# Patient Record
Sex: MALE | Race: WHITE | NOT HISPANIC OR LATINO | Employment: UNEMPLOYED | URBAN - METROPOLITAN AREA
[De-identification: names, ages, dates, MRNs, and addresses within clinical notes are randomized per-mention and may not be internally consistent; named-entity substitution may affect disease eponyms.]

---

## 2017-01-03 ENCOUNTER — OFFICE VISIT (OUTPATIENT)
Dept: FAMILY MEDICINE | Facility: CLINIC | Age: 57
End: 2017-01-03
Payer: COMMERCIAL

## 2017-01-03 VITALS
OXYGEN SATURATION: 98 % | HEIGHT: 71 IN | HEART RATE: 96 BPM | RESPIRATION RATE: 16 BRPM | BODY MASS INDEX: 21.98 KG/M2 | DIASTOLIC BLOOD PRESSURE: 80 MMHG | TEMPERATURE: 97.5 F | SYSTOLIC BLOOD PRESSURE: 140 MMHG | WEIGHT: 157 LBS

## 2017-01-03 DIAGNOSIS — M70.22 OLECRANON BURSITIS OF LEFT ELBOW: Primary | ICD-10-CM

## 2017-01-03 DIAGNOSIS — F98.8 ATTENTION DEFICIT DISORDER: ICD-10-CM

## 2017-01-03 PROCEDURE — 99213 OFFICE O/P EST LOW 20 MIN: CPT | Performed by: FAMILY MEDICINE

## 2017-01-03 RX ORDER — METHYLPHENIDATE HYDROCHLORIDE 10 MG/1
TABLET ORAL
Qty: 150 TABLET | Refills: 0 | Status: SHIPPED | OUTPATIENT
Start: 2017-01-03 | End: 2017-03-27

## 2017-01-03 NOTE — PROGRESS NOTES
Subjective: A few months ago he banged his left elbow really hard on something and it was painful for a while, and the olecranon bursa swelled up, quite big for a while, the pain gradually went away and he has full range of motion but there remains a small bursa sac really doesn't bother him much. He just wanted to make sure it's okay. He is also due to get refills of his next 3 months for the Adderall and he will see me in 3 months.    Objective: There is a quarter sized olecranon bursa that's nontender and he has full range of motion and normal strength in the left arm.    Assessment and plan: Small olecranon bursa enlargement, discussed how they form, no recent doing anything about it since it doesn't bother him. If it gets really big again we could consider training it and injecting some steroids.

## 2017-01-03 NOTE — NURSING NOTE
"Chief Complaint   Patient presents with     Elbow left     A.D.H.D     initial /80 mmHg  Pulse 96  Temp(Src) 97.5  F (36.4  C) (Oral)  Resp 16  Ht 5' 11\" (1.803 m)  Wt 157 lb (71.215 kg)  BMI 21.91 kg/m2  SpO2 98% Estimated body mass index is 21.91 kg/(m^2) as calculated from the following:    Height as of this encounter: 5' 11\" (1.803 m).    Weight as of this encounter: 157 lb (71.215 kg).  BP completed using cuff size: regular.  L  arm      Health Maintenance that is potentially due pending provider review:  NONE    n/a    Brenden Cobb ma  "

## 2017-03-27 DIAGNOSIS — F98.8 ATTENTION DEFICIT DISORDER: ICD-10-CM

## 2017-03-27 RX ORDER — METHYLPHENIDATE HYDROCHLORIDE 10 MG/1
TABLET ORAL
Qty: 150 TABLET | Refills: 0 | Status: CANCELLED | OUTPATIENT
Start: 2017-03-27

## 2017-03-27 RX ORDER — METHYLPHENIDATE HYDROCHLORIDE 10 MG/1
TABLET ORAL
Qty: 150 TABLET | Refills: 0 | Status: SHIPPED | OUTPATIENT
Start: 2017-03-27 | End: 2017-04-05

## 2017-03-27 NOTE — TELEPHONE ENCOUNTER
MP  Please clarify.  Patient saw you 1/3/17 for elbow problem.  Note from 1/3/17 OV:  He is also due to get refills of his next 3 months for the Adderall and he will see me in 3 months.    Do you want to see patient in April?   Adele Castellano RN

## 2017-03-27 NOTE — TELEPHONE ENCOUNTER
Reason for Call:  Other prescription    Detailed comments: patient would like to  rx for ritalin tomorrow 3/27 if possible.     Phone Number Patient can be reached at: Home number on file [phone] (home)    Best Time: ant    Can we leave a detailed message on this number? YES    Call taken on 3/27/2017 at 11:57 AM by Elena Lazaro

## 2017-03-27 NOTE — TELEPHONE ENCOUNTER
MP,    Controlled Substance Refill Request for Ritalin    Last refill: 3/3/2017 #150    Last clinic visit: 1/3/2017     Clinic visit frequency required: Q 3 months; due for appt April 2017  Next appt: none    Controlled substance agreement on file: No.    Documentation in problem list reviewed:  No, documentation started, needs to be completed    Processing:  Patient will  in clinic    [place] reviewed:  reviewed- no concerns  [place]  [email or web address]    Please authorize if appropriate.  Thanks,  Kirstie KUMAR RN

## 2017-04-05 ENCOUNTER — OFFICE VISIT (OUTPATIENT)
Dept: FAMILY MEDICINE | Facility: CLINIC | Age: 57
End: 2017-04-05
Payer: COMMERCIAL

## 2017-04-05 VITALS
TEMPERATURE: 97.7 F | HEIGHT: 71 IN | OXYGEN SATURATION: 100 % | WEIGHT: 161 LBS | DIASTOLIC BLOOD PRESSURE: 70 MMHG | HEART RATE: 81 BPM | RESPIRATION RATE: 16 BRPM | SYSTOLIC BLOOD PRESSURE: 110 MMHG | BODY MASS INDEX: 22.54 KG/M2

## 2017-04-05 DIAGNOSIS — F98.8 ATTENTION DEFICIT DISORDER: ICD-10-CM

## 2017-04-05 PROCEDURE — 99213 OFFICE O/P EST LOW 20 MIN: CPT | Performed by: FAMILY MEDICINE

## 2017-04-05 RX ORDER — METHYLPHENIDATE HYDROCHLORIDE 10 MG/1
TABLET ORAL
Qty: 150 TABLET | Refills: 0 | Status: SHIPPED | OUTPATIENT
Start: 2017-04-05 | End: 2018-03-01

## 2017-04-05 NOTE — NURSING NOTE
"Chief Complaint   Patient presents with     Recheck Medication     initial /70 (BP Location: Left arm, Cuff Size: Adult Regular)  Pulse 81  Temp 97.7  F (36.5  C) (Oral)  Resp 16  Ht 5' 11\" (1.803 m)  Wt 161 lb (73 kg)  SpO2 100%  BMI 22.45 kg/m2 Estimated body mass index is 22.45 kg/(m^2) as calculated from the following:    Height as of this encounter: 5' 11\" (1.803 m).    Weight as of this encounter: 161 lb (73 kg).  BP completed using cuff size: regular.  L  arm      Health Maintenance that is potentially due pending provider review:  NONE    n/a    Brenden Cobb ma  "

## 2017-04-05 NOTE — MR AVS SNAPSHOT
"              After Visit Summary   2017    Parveen Flor    MRN: [id number]           Patient Information     Date Of Birth          1960        Visit Information        Provider Department      2017 2:00 PM Arnold Barahona MD [facility]        Today's Diagnoses     Attention deficit disorder           Follow-ups after your visit        Who to contact     If you have questions or need follow up information about today's clinic visit or your schedule please contact [facility] directly at [phone].  Normal or non-critical lab and imaging results will be communicated to you by Kardiumhart, letter or phone within 4 business days after the clinic has received the results. If you do not hear from us within 7 days, please contact the clinic through Kardiumhart or phone. If you have a critical or abnormal lab result, we will notify you by phone as soon as possible.  Submit refill requests through [employer] or call your pharmacy and they will forward the refill request to us. Please allow 3 business days for your refill to be completed.          Additional Information About Your Visit        MyChart Information     [employer] lets you send messages to your doctor, view your test results, renew your prescriptions, schedule appointments and more. To sign up, go to [email or web address] . Click on \"Log in\" on the left side of the screen, which will take you to the Welcome page. Then click on \"Sign up Now\" on the right side of the page.     You will be asked to enter the access code listed below, as well as some personal information. Please follow the directions to create your username and password.     Your access code is: [email or web address]  Expires: 2017  3:02 PM     Your access code will  in 90 days. If you need help or a new code, please call your [facility]) or [phone].        Care EveryWhere ID     This is your Care EveryWhere ID. This could be used by other " "organizations to access your [facility] medical records  [id number]        Your Vitals Were     Pulse Temperature Respirations Height Pulse Oximetry BMI (Body Mass Index)    81 97.7  F (36.5  C) (Oral) 16 5' 11\" (1.803 m) 100% 22.45 kg/m2       Blood Pressure from Last 3 Encounters:   04/05/17 110/70   01/03/17 140/80   10/05/16 110/60    Weight from Last 3 Encounters:   04/05/17 161 lb (73 kg)   01/03/17 157 lb (71.2 kg)   10/05/16 160 lb (72.6 kg)              Today, you had the following     No orders found for display         Where to get your medicines      Some of these will need a paper prescription and others can be bought over the counter.  Ask your nurse if you have questions.     Bring a paper prescription for each of these medications     methylphenidate 10 MG tablet    methylphenidate 10 MG tablet    methylphenidate 10 MG tablet          Primary Care Provider Office Phone # Fax #    Arnold Barahona [phone]       [facility] [address] [place] [address]        Thank you!     Thank you for choosing [facility]  for your care. Our goal is always to provide you with excellent care. Hearing back from our patients is one way we can continue to improve our services. Please take a few minutes to complete the written survey that you may receive in the mail after your visit with us. Thank you!             Your Updated Medication List - Protect others around you: Learn how to safely use, store and throw away your medicines at www.disposemymeds.org.          This list is accurate as of: 4/5/17  3:02 PM.  Always use your most recent med list.                   Brand Name Dispense Instructions for use    * methylphenidate 10 MG tablet    RITALIN    150 tablet    2 am, 2 mid day, 1 at hs       * methylphenidate 10 MG tablet    RITALIN    150 tablet    2 am, 2 mid day, 1 at hs       * methylphenidate 10 MG tablet    RITALIN    150 tablet    2 am, 2 mid " day, 1 at        * Notice:  This list has 3 medication(s) that are the same as other medications prescribed for you. Read the directions carefully, and ask your doctor or other care provider to review them with you.

## 2017-04-05 NOTE — PROGRESS NOTES
Subjective:see previous note. He is here for follow-up but of course I gave him 3 prescriptions last time so he has enough to last until July. He continues to do well with the medication. He hopes to retire in about 4 years. 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 It's not disabling at this time. 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 Heart is regular without murmurs.    Assessment and plan: Follow-up of ADD and he really has been a success story in the use of this medication to keep his life in some kind of balance and be able to continue to work. No side effects. I wrote him 3 more prescriptions which will get him up to almost 6 months from now. When he is due for new prescriptions he can make an appointment with one of my partners to kind of started over about I'm pretty comfortable with him being on this medication long-term. Perhaps when he retires he won't use it anymore

## 2017-06-13 ENCOUNTER — OFFICE VISIT (OUTPATIENT)
Dept: FAMILY MEDICINE | Facility: CLINIC | Age: 57
End: 2017-06-13
Payer: COMMERCIAL

## 2017-06-13 VITALS
HEIGHT: 71 IN | TEMPERATURE: 97.8 F | OXYGEN SATURATION: 98 % | RESPIRATION RATE: 16 BRPM | HEART RATE: 86 BPM | BODY MASS INDEX: 21.98 KG/M2 | SYSTOLIC BLOOD PRESSURE: 120 MMHG | DIASTOLIC BLOOD PRESSURE: 80 MMHG | WEIGHT: 157 LBS

## 2017-06-13 DIAGNOSIS — M79.645 BILATERAL THUMB PAIN: Primary | ICD-10-CM

## 2017-06-13 DIAGNOSIS — M79.641 PAIN IN BOTH HANDS: ICD-10-CM

## 2017-06-13 DIAGNOSIS — M79.644 BILATERAL THUMB PAIN: Primary | ICD-10-CM

## 2017-06-13 DIAGNOSIS — M79.642 PAIN IN BOTH HANDS: ICD-10-CM

## 2017-06-13 PROCEDURE — 99213 OFFICE O/P EST LOW 20 MIN: CPT | Performed by: PHYSICIAN ASSISTANT

## 2017-06-13 NOTE — MR AVS SNAPSHOT
After Visit Summary   6/13/2017    Parveen Flor    MRN: [id number]           Patient Information     Date Of Birth          1960        Visit Information        Provider Department      6/13/2017 10:40 AM Avi Bonilla PA-C [facility]        Today's Diagnoses     Bilateral thumb pain    -  1    Pain in both hands           Follow-ups after your visit        Additional Services     ORTHO  REFERRAL       [facility] is referring you to the Orthopedic  Services at [facility].       The  Representative will assist you in the coordination of your Orthopedic and Musculoskeletal Care as prescribed by your physician.    The  Representative will call you within 1 business day to help schedule your appointment, or you may contact the  Representative at:    All areas ~ [phone]     Type of Referral : Non Surgical       Timeframe requested: 3 - 5 days    Coverage of these services is subject to the terms and limitations of your health insurance plan.  Please call member services at your health plan with any benefit or coverage questions.      If X-rays, CT or MRI's have been performed, please contact the facility where they were done to arrange for , prior to your scheduled appointment.  Please bring this referral request to your appointment and present it to your specialist.                  Who to contact     If you have questions or need follow up information about today's clinic visit or your schedule please contact [facility] directly at [phone].  Normal or non-critical lab and imaging results will be communicated to you by MyChart, letter or phone within 4 business days after the clinic has received the results. If you do not hear from us within 7 days, please contact the clinic through MyChart or phone. If you have a critical or abnormal lab result, we will  "notify you by phone as soon as possible.  Submit refill requests through [employer] or call your pharmacy and they will forward the refill request to us. Please allow 3 business days for your refill to be completed.          Additional Information About Your Visit        [employer] Information     [employer] lets you send messages to your doctor, view your test results, renew your prescriptions, schedule appointments and more. To sign up, go to [email or web address] . Click on \"Log in\" on the left side of the screen, which will take you to the Welcome page. Then click on \"Sign up Now\" on the right side of the page.     You will be asked to enter the access code listed below, as well as some personal information. Please follow the directions to create your username and password.     Your access code is: [email or web address]  Expires: 2017  3:02 PM     Your access code will  in 90 days. If you need help or a new code, please call your [facility] clinic or [phone].        Care EveryWhere ID     This is your Care EveryWhere ID. This could be used by other organizations to access your [facility] medical records  [id number]        Your Vitals Were     Pulse Temperature Respirations Height Pulse Oximetry BMI (Body Mass Index)    86 97.8  F (36.6  C) (Oral) 16 5' 11\" (1.803 m) 98% 21.9 kg/m2       Blood Pressure from Last 3 Encounters:   17 120/80   17 110/70   17 140/80    Weight from Last 3 Encounters:   17 157 lb (71.2 kg)   17 161 lb (73 kg)   17 157 lb (71.2 kg)              We Performed the Following     ORTHO  REFERRAL          Today's Medication Changes          These changes are accurate as of: 17 10:49 AM.  If you have any questions, ask your nurse or doctor.               Start taking these medicines.        Dose/Directions    diclofenac 1 % Gel topical gel   Commonly known as:  VOLTAREN   Used for:  Pain in both hands, Bilateral thumb pain   Started by:  Irene, " Avi Kiser PA-C        Apply 2 grams to hands four times daily using enclosed dosing card.   Quantity:  100 g   Refills:  1            Where to get your medicines      Call your pharmacy to confirm that your medication is ready for pickup. It may take up to 24 hours for them to receive the prescription. If the prescription is not ready within 3 business days, please contact your clinic or your provider.     We will let you know when these medications are ready. If you don't hear back within 3 business days, please contact us.     diclofenac 1 % Gel topical gel                Primary Care Provider Office Phone # Fax #    Arnold Barahona [phone]       [facility] [address] [address]        Thank you!     Thank you for choosing [facility]  for your care. Our goal is always to provide you with excellent care. Hearing back from our patients is one way we can continue to improve our services. Please take a few minutes to complete the written survey that you may receive in the mail after your visit with us. Thank you!             Your Updated Medication List - Protect others around you: Learn how to safely use, store and throw away your medicines at www.disposemymeds.org.          This list is accurate as of: 6/13/17 10:49 AM.  Always use your most recent med list.                   Brand Name Dispense Instructions for use    diclofenac 1 % Gel topical gel    VOLTAREN    100 g    Apply 2 grams to hands four times daily using enclosed dosing card.       * methylphenidate 10 MG tablet    RITALIN    150 tablet    2 am, 2 mid day, 1 at hs       * methylphenidate 10 MG tablet    RITALIN    150 tablet    2 am, 2 mid day, 1 at hs       * methylphenidate 10 MG tablet    RITALIN    150 tablet    2 am, 2 mid day, 1 at hs       * Notice:  This list has 3 medication(s) that are the same as other medications prescribed for you. Read the directions carefully,  and ask your doctor or other care provider to review them with you.

## 2017-06-13 NOTE — PROGRESS NOTES
"  SUBJECTIVE:                                                    Parveen Flor is a 56 year old male who presents to clinic today for the following health issues:      Musculoskeletal problem/pain      Duration: many years-getting worse    Description  Location: both hands    Intensity:  severe    Accompanying signs and symptoms: numbness, tingling and weakness of not all the time    History  Previous similar problem: no   Previous evaluation:  none    Precipitating or alleviating factors:  Trauma or overuse: no   Aggravating factors include: none    Therapies tried and outcome: rest/inactivity and heat           Problem list and histories reviewed & adjusted, as indicated.  Additional history: this is something that he has been dealing with on and off for many years, but this has increased over the last several months.  He finds that he gets weakness, numbness, tingling, and pain in wrist.  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  Majority of the pain is over thumbs.  nsaids have helped.      BP Readings from Last 3 Encounters:   06/13/17 120/80   04/05/17 110/70   01/03/17 140/80    Wt Readings from Last 3 Encounters:   06/13/17 157 lb (71.2 kg)   04/05/17 161 lb (73 kg)   01/03/17 157 lb (71.2 kg)                    Reviewed and updated as needed this visit by clinical staff  Tobacco  Allergies  Meds  Med Hx  Soc Hx      Reviewed and updated as needed this visit by Provider         ROS:  Constitutional, HEENT, cardiovascular, pulmonary, gi and gu systems are negative, except as otherwise noted.    OBJECTIVE:                                                    /80 (BP Location: Right arm, Cuff Size: Adult Regular)  Pulse 86  Temp 97.8  F (36.6  C) (Oral)  Resp 16  Ht 5' 11\" (1.803 m)  Wt 157 lb (71.2 kg)  SpO2 98%  BMI 21.9 kg/m2  Body mass index is 21.9 kg/(m^2).  GENERAL: alert and no distress  EYES: Eyes grossly normal to inspection  MS: no swelling over wrist, hands, or " thumbs.  Pain with flexion>extension.    NEURO:  strength 4/5. May be due to pain.  Normal sensation.  Negative tinnels at wrist    Diagnostic Test Results:  none      ASSESSMENT/PLAN:                                                            1. Bilateral thumb pain  Will get further ortho evaluation.  - diclofenac (VOLTAREN) 1 % GEL topical gel; Apply 2 grams to hands four times daily using enclosed dosing card.  Dispense: 100 g; Refill: 1  - ORTHO  REFERRAL    2. Pain in both hands  Based on history of symptoms, may have some carpal tunnel as well.  - diclofenac (VOLTAREN) 1 % GEL topical gel; Apply 2 grams to hands four times daily using enclosed dosing card.  Dispense: 100 g; Refill: 1  - ORTHO  REFERRAL    Did encourage him to follow up with well exam, it has been a few years.    Avi Bonilla PA-C  [facility]

## 2017-06-13 NOTE — NURSING NOTE
"Chief Complaint   Patient presents with     Hand Pain     initial /80 (BP Location: Right arm, Cuff Size: Adult Regular)  Pulse 86  Temp 97.8  F (36.6  C) (Oral)  Resp 16  Ht 5' 11\" (1.803 m)  Wt 157 lb (71.2 kg)  SpO2 98%  BMI 21.9 kg/m2 Estimated body mass index is 21.9 kg/(m^2) as calculated from the following:    Height as of this encounter: 5' 11\" (1.803 m).    Weight as of this encounter: 157 lb (71.2 kg).  BP completed using cuff size: regular.   R arm      Health Maintenance that is potentially due pending provider review:  NONE    n/a    Brenden Cobb ma  "

## 2018-03-01 ENCOUNTER — OFFICE VISIT (OUTPATIENT)
Dept: FAMILY MEDICINE | Facility: CLINIC | Age: 58
End: 2018-03-01
Payer: MEDICAID

## 2018-03-01 VITALS
HEIGHT: 71 IN | RESPIRATION RATE: 16 BRPM | DIASTOLIC BLOOD PRESSURE: 75 MMHG | SYSTOLIC BLOOD PRESSURE: 130 MMHG | TEMPERATURE: 97.6 F | HEART RATE: 68 BPM | WEIGHT: 164 LBS | BODY MASS INDEX: 22.96 KG/M2

## 2018-03-01 DIAGNOSIS — M72.0 DUPUYTREN'S CONTRACTURE OF BOTH HANDS: ICD-10-CM

## 2018-03-01 DIAGNOSIS — M18.0 OSTEOARTHRITIS OF THUMBS, BILATERAL: ICD-10-CM

## 2018-03-01 DIAGNOSIS — M54.50 BILATERAL LOW BACK PAIN WITHOUT SCIATICA, UNSPECIFIED CHRONICITY: ICD-10-CM

## 2018-03-01 DIAGNOSIS — M75.102 ROTATOR CUFF SYNDROME, LEFT: ICD-10-CM

## 2018-03-01 DIAGNOSIS — F98.8 ATTENTION DEFICIT DISORDER, UNSPECIFIED HYPERACTIVITY PRESENCE: Primary | ICD-10-CM

## 2018-03-01 DIAGNOSIS — M75.101 ROTATOR CUFF SYNDROME, RIGHT: ICD-10-CM

## 2018-03-01 DIAGNOSIS — K13.21 LEUKOPLAKIA OF TONGUE: ICD-10-CM

## 2018-03-01 DIAGNOSIS — Z79.899 CONTROLLED SUBSTANCE AGREEMENT SIGNED: ICD-10-CM

## 2018-03-01 PROCEDURE — 99000 SPECIMEN HANDLING OFFICE-LAB: CPT | Performed by: FAMILY MEDICINE

## 2018-03-01 PROCEDURE — 80307 DRUG TEST PRSMV CHEM ANLYZR: CPT | Mod: 90 | Performed by: FAMILY MEDICINE

## 2018-03-01 PROCEDURE — 99214 OFFICE O/P EST MOD 30 MIN: CPT | Performed by: FAMILY MEDICINE

## 2018-03-01 RX ORDER — METHYLPHENIDATE HYDROCHLORIDE 10 MG/1
TABLET ORAL
Qty: 150 TABLET | Refills: 0 | Status: SHIPPED | OUTPATIENT
Start: 2018-03-31 | End: 2018-03-01

## 2018-03-01 RX ORDER — METHYLPHENIDATE HYDROCHLORIDE 10 MG/1
TABLET ORAL
Qty: 150 TABLET | Refills: 0 | Status: SHIPPED | OUTPATIENT
Start: 2018-04-30 | End: 2018-04-02

## 2018-03-01 RX ORDER — METHYLPHENIDATE HYDROCHLORIDE 10 MG/1
TABLET ORAL
Qty: 150 TABLET | Refills: 0 | Status: SHIPPED | OUTPATIENT
Start: 2018-03-01 | End: 2018-03-01

## 2018-03-01 NOTE — MR AVS SNAPSHOT
After Visit Summary   3/1/2018    Parveen Flor    MRN: [id number]           Patient Information     Date Of Birth          1960        Visit Information        Provider Department      3/1/2018 4:30 PM Leon Moss MD [facility]        Today's Diagnoses     Attention deficit disorder, unspecified hyperactivity presence    -  1    Dupuytren's contracture of both hands        Rotator cuff syndrome, left        Rotator cuff syndrome, right        Osteoarthritis of thumbs, bilateral        Bilateral low back pain without sciatica, unspecified chronicity        Controlled substance agreement signed        Leukoplakia of tongue           Follow-ups after your visit        Additional Services     CARE COORDINATION REFERRAL       Services are provided by a Care Coordinator for people with complex needs such as: medical, social, or financial troubles.  The Care Coordinator works with the patient and their Primary Care Provider to determine health goals, obtain resources, achieve outcomes, and develop care plans that help coordinate the patient's care.     Reason for Referral: Disability application    Additional pertinent details:      Clinical Staff have discussed the Care Coordination Referral with the patient and/or caregiver: yes            [facility], HAND, AND CHIR[facility]RACTIC REFERRAL       **This order will print in the [facility] Scheduling Office**    Physical Therapy, Hand Therapy and Chiropractic Care are available through:    *[facility]  *[facility]  *[facility]    Call one number to schedule at any of the above locations: [phone].    Your provider has referred you to: As Indicated:     Indication/Reason for Referral: bilat shoulder pain, bilat OA thumb  Onset of Illness:years  Therapy Orders: Evaluate and Treat  Special Programs: Hand for OA thumbs  Special Request:     Huey Arzate      Additional Comments for  the Therapist or Chiropractor:     Please be aware that coverage of these services is subject to the terms and limitations of your health insurance plan.  Call member services at your health plan with any benefit or coverage questions.      Please bring the following to your appointment:    *Your personal calendar for scheduling future appointments  *Comfortable clothing            ORTHO  REFERRAL       [facility] is referring you to the Orthopedic  Services at [facility].       The  Representative will assist you in the coordination of your Orthopedic and Musculoskeletal Care as prescribed by your physician.    The  Representative will call you within 1 business day to help schedule your appointment, or you may contact the  Representative at:    All areas ~ [phone]     Type of Referral : Surgical / Specialist       Timeframe requested: Within 2 weeks    Coverage of these services is subject to the terms and limitations of your health insurance plan.  Please call member services at your health plan with any benefit or coverage questions.      If X-rays, CT or MRI's have been performed, please contact the facility where they were done to arrange for , prior to your scheduled appointment.  Please bring this referral request to your appointment and present it to your specialist.            OTOLARYNGOLOGY REFERRAL       Your provider has referred you to: [facility]: [place] [facility] [place] [phone]   [email or web address]    Please be aware that coverage of these services is subject to the terms and limitations of your health insurance plan.  Call member services at your health plan with any benefit or coverage questions.      Please bring the following with you to your appointment:    (1) Any X-Rays, CTs or MRIs which have been performed.  Contact the facility where they were done to arrange  "for  prior to your scheduled appointment.   (2) List of current medications  (3) This referral request   (4) Any documents/labs given to you for this referral                  Who to contact     If you have questions or need follow up information about today's clinic visit or your schedule please contact [facility] directly at [phone].  Normal or non-critical lab and imaging results will be communicated to you by MyChart, letter or phone within 4 business days after the clinic has received the results. If you do not hear from us within 7 days, please contact the clinic through MyChart or phone. If you have a critical or abnormal lab result, we will notify you by phone as soon as possible.  Submit refill requests through baseclick or call your pharmacy and they will forward the refill request to us. Please allow 3 business days for your refill to be completed.          Additional Information About Your Visit        [employer] Information     baseclick lets you send messages to your doctor, view your test results, renew your prescriptions, schedule appointments and more. To sign up, go to [email or web address] . Click on \"Log in\" on the left side of the screen, which will take you to the Welcome page. Then click on \"Sign up Now\" on the right side of the page.     You will be asked to enter the access code listed below, as well as some personal information. Please follow the directions to create your username and password.     Your access code is: [email or web address]  Expires: 2018  5:24 PM     Your access code will  in 90 days. If you need help or a new code, please call your [facility] or [phone].        Care EveryWhere ID     This is your Care EveryWhere ID. This could be used by other organizations to access your [place] medical records  [id number]        Your Vitals Were     Pulse Temperature Respirations Height BMI (Body Mass Index)       68 97.6  F (36.4  C) (Oral) 16 " "5' 11\" (1.803 m) 22.87 kg/m2        Blood Pressure from Last 3 Encounters:   03/01/18 130/75   06/13/17 120/80   04/05/17 110/70    Weight from Last 3 Encounters:   03/01/18 164 lb (74.4 kg)   06/13/17 157 lb (71.2 kg)   04/05/17 161 lb (73 kg)              We Performed the Following     CARE COORDINATION REFERRAL     Drug  Screen Comprehensive, Urine w/o Reported Meds (Pain Care Package)     [facility], HAND, AND CHIR[facility]RACTIC REFERRAL     ORTHO  REFERRAL     OTOLARYNGOLOGY REFERRAL          Today's Medication Changes          These changes are accurate as of 3/1/18  9:09 PM.  If you have any questions, ask your nurse or doctor.               Start taking these medicines.        Dose/Directions    diclofenac 1 % Gel topical gel   Commonly known as:  VOLTAREN   Used for:  Osteoarthritis of thumbs, bilateral   Started by:  Leon Moss MD        Place onto the skin 4 times daily   Quantity:  100 g   Refills:  3       methylphenidate 10 MG tablet   Commonly known as:  RITALIN   Used for:  Attention deficit disorder, unspecified hyperactivity presence   Started by:  Leon Moss MD        Start taking on:  4/30/2018   2 am, 2 mid day, 1 at hs   Quantity:  150 tablet   Refills:  0            Where to get your medicines      These medications were sent to [facility] [id number] [place] [address]  [id number] [place] [address]     Phone:  [phone]     diclofenac 1 % Gel topical gel         Some of these will need a paper prescription and others can be bought over the counter.  Ask your nurse if you have questions.     Bring a paper prescription for each of these medications     methylphenidate 10 MG tablet                Primary Care Provider Office Phone # Fax #    Leon Moss [phone] [address] [facility] [address]        Equal Access to Services     YUNG CHEATHAM AH: Trinh Cano, watundeda joaquin, qaybta kaalmakenneth " sheela hickeymorganoscar la'aaruma ah. [facility] [phone].    ATENCIÓN: Si habla simona, tiene a rogers disposición servicios gratuitos de asistencia lingüística. Arpan al [phone].    We comply with applicable federal civil rights laws and [place] laws. We do not discriminate on the basis of race, color, national origin, age, disability, sex, sexual orientation, or gender identity.            Thank you!     Thank you for choosing [facility]  for your care. Our goal is always to provide you with excellent care. Hearing back from our patients is one way we can continue to improve our services. Please take a few minutes to complete the written survey that you may receive in the mail after your visit with us. Thank you!             Your Updated Medication List - Protect others around you: Learn how to safely use, store and throw away your medicines at www.disposemymeds.org.          This list is accurate as of 3/1/18  9:09 PM.  Always use your most recent med list.                   Brand Name Dispense Instructions for use Diagnosis    diclofenac 1 % Gel topical gel    VOLTAREN    100 g    Place onto the skin 4 times daily    Osteoarthritis of thumbs, bilateral       methylphenidate 10 MG tablet   Start taking on:  4/30/2018    RITALIN    150 tablet    2 am, 2 mid day, 1 at hs    Attention deficit disorder, unspecified hyperactivity presence

## 2018-03-01 NOTE — NURSING NOTE
"Chief Complaint   Patient presents with     Attention Deficit Disorder     Needs Ritalin      Hand Pain     pain in both hands x 8 months     Establish Care       Initial /75 (BP Location: Right arm, Patient Position: Chair, Cuff Size: Adult Regular)  Pulse 68  Temp 97.6  F (36.4  C) (Oral)  Resp 16  Ht 5' 11\" (1.803 m)  Wt 164 lb (74.4 kg)  BMI 22.87 kg/m2 Estimated body mass index is 22.87 kg/(m^2) as calculated from the following:    Height as of this encounter: 5' 11\" (1.803 m).    Weight as of this encounter: 164 lb (74.4 kg).  Medication Reconciliation: complete rt arm Mago Monroy MA      "

## 2018-03-01 NOTE — LETTER
[facility]    03/01/18    Patient: Parveen Flor  YOB: 1960  Medical Record Number: [id number]                                                                  Controlled Substance Agreement  I understand that my care provider has prescribed controlled substances (narcotics, tranquilizers, and/or stimulants) to help manage my condition(s).  I am taking this medicine to help me function or work.  I know that this is strong medicine.  It could have serious side effects and even cause a dependency on the drug.  If I stop these medicines suddenly, I could have severe withdrawal symptoms.    The risks, benefits, and side effects of these medication(s) were explained to me.  I agree that:  1. I will take part in other treatments as advised by my provider.  This may be psychiatry or counseling, physical therapy, behavioral therapy, group treatment, or a referral to a pain clinic.  I will reduce or stop my medicine when my provider tells me to do so.   2. I will take my medicines as prescribed.  I will not change the dose or schedule unless my provider tells me to.  There will be no refills if I  run out early.   I may be contacted at any time without warning and asked to complete a drug test or pill count.   3. I will keep all my appointments at the clinic.  If I miss appointments or fail to follow instructions, my provider may stop my medicine.  4. I will not ask other providers to prescribe controlled substances. And I will not accept controlled substances from other people. If I need another prescribed controlled substance for a new reason, I will notify my provider within one business day.  5. If I enroll in the [place] Medical Marijuana program, I will tell my provider.  I will also sign an agreement to share my medical records with my provider.  6. I will use one pharmacy to fill all of my controlled substance prescriptions.  If my prescription is mailed to my pharmacy, it  may take 5 to 7 days for my medicine to be ready.  7. I understand that my provider, clinic care team, and pharmacy can track controlled substance prescriptions from other providers through a central database (prescription monitoring program).  8. I will bring in my list of medications (or my medicine bottles) each time I come to the clinic.  REV-  04/2016                                                                                                                                            Page 1 of 2      [facility]    03/01/18    Patient: Parveen Flor  YOB: 1960  Medical Record Number: [id number]    9. Refills of controlled substances will be made only during office hours.  It is up to me to make sure that I do not run out of my medicines on weekends or holidays.    10. I am responsible for my prescriptions.  If the medicine is lost or stolen, it will not be replaced.   I also agree not to share these medicines with anyone.  11. I agree to not use ANY illegal or recreational drugs.  This includes marijuana, cocaine, bath salts or other drugs.  I agree not to use alcohol unless my provider says I may.  I agree to give urine samples whenever asked.  If I fail to give a urine sample, the provider may stop my medicine.     12. I will tell my nurse or provider right away if I become pregnant or have a new medical problem treated outside of [facility].  13. I understand that this medicine can affect my thinking and judgment.  It may be unsafe for me to drive, use machinery and do dangerous tasks.  I will not do any of these things until I know how the medicine affects me.  If my dose changes, I will wait to see how it affects me.  I will contact my provider if I have concerns about medicine side effects.  I understand that if I do not follow any of the conditions above, my prescriptions or treatment may be stopped.    I agree that my provider, clinic care team, and  pharmacy may work with any city, state or federal law enforcement agency that investigates the misuse, sale, or other diversion of my controlled medicine. I will allow my provider to discuss my care with or share a copy of this agreement with any other treating provider, pharmacy or emergency room where I receive care.  I agree to give up (waive) any right of privacy or confidentiality with respect to these authorizations.   I have read this agreement and have asked questions about anything I did not understand.   ___________________________________    ___________________________  Patient Signature                                                           Date and Time  ___________________________________     ____________________________  Witness                                                                            Date and Time  ___________________________________  Leon Moss MD  REV-  04/2016                                                                                                                                                                 Page 2 of 2

## 2018-03-01 NOTE — PROGRESS NOTES
SUBJECTIVE:   Parveen Flor is a 57 year old male who presents to clinic today for the following health issues:      Attention deficit disorder, unspecified hyperactivity presence  (primary encounter diagnosis)  Previously treated, no insurance for 6 months.  Desires reinstitution  Dupuytren's contracture of both hands previous employment created calluses of hand, this appears to be residual  Rotator cuff syndrome, left  Rotator cuff syndrome, right time unclear, pain with abduction fully bilateral  Osteoarthritis of thumbs, bilateral base of metacarpals, no therapies tried  Bilateral low back pain without sciatica, unspecified chronicity long-standing diagnosis, no current pain, hypoesthesia to thighs bilaterally    Continues to syndrome,    Problem list and histories reviewed & adjusted, as indicated.  Additional history: as documented    Reviewed and updated as needed this visit by clinical staff  Tobacco  Allergies  Meds  Med Hx  Surg Hx  Fam Hx  Soc Hx       Past Medical History:   Diagnosis Date     Attention deficit disorder, unspecified hyperactivity presence 3/1/2018     Bilateral low back pain without sciatica, unspecified chronicity 3/1/2018     Chemical dependency (H)     Sober x 7 years.     Controlled substance agreement signed 3/1/2018     Dupuytren's contracture of both hands 3/1/2018     Leukoplakia of tongue 3/1/2018     Osteoarthritis of thumbs, bilateral 3/1/2018     Rotator cuff syndrome, left 3/1/2018     Rotator cuff syndrome, right 3/1/2018       History reviewed. No pertinent surgical history.    Family History   Problem Relation Age of Onset     HEART DISEASE Paternal Grandmother      Arthritis Paternal Grandmother      Alcohol/Drug Maternal Grandfather      CANCER Maternal Grandmother      Liver       Social History   Substance Use Topics     Smoking status: Current Every Day Smoker     Packs/day: 0.50     Years: 4.00     Types: Cigarettes     Last attempt to quit: 2/28/2009  "    Smokeless tobacco: Never Used     Alcohol use No       Reviewed and updated as needed this visit by Provider         ROS: No systemic symptoms, continues to drum, he has observed an ill-defined abnormality of his uvula    This document serves as a record of the services and decisions personally performed and made by Leon Moss MD. It was created on his behalf by Princess Haddad, a trained medical scribe.  The creation of this document is based on the scribe's personal observations and the provider's statements to the medical scribe.  Princess Haddad, March 1, 2018 5:00 PM    OBJECTIVE:     /75 (BP Location: Right arm, Patient Position: Chair, Cuff Size: Adult Regular)  Pulse 68  Temp 97.6  F (36.4  C) (Oral)  Resp 16  Ht 5' 11\" (1.803 m)  Wt 164 lb (74.4 kg)  BMI 22.87 kg/m2  Body mass index is 22.87 kg/(m^2).  Numerous arthritic nodules, no synovitis in hands, pains base of thumb metacarpals.  Shoulders show pain to abduction beyond 90  bilaterally, to internal rotation bilaterally,  BACK: Able to flex within 0  inches of the floor, normal heel toe, negative SLR.  Palms bilaterally with Dupuytrene's contractures medially bilat    Oral inspection shows cobblestoning of posterior pharynx as well as uvula and tonsillar pillars.  Leukoplakia dorsal aspect left tongue          ASSESSMENT/PLAN:     ASSESSMENT / PLAN:  (F98.8) Attention deficit disorder, unspecified hyperactivity presence  (primary encounter diagnosis)  Comment: Previously diagnosed  Plan: Drug  Screen Comprehensive, Urine w/o Reported         Meds (Pain Care Package), methylphenidate         (RITALIN) 10 MG tablet, DISCONTINUED:         methylphenidate (RITALIN) 10 MG tablet,         DISCONTINUED: methylphenidate (RITALIN) 10 MG         tablet        [facility] therapy metrics    (M72.0) Dupuytren's contracture of both hands  Comment: Discussed therapeutic interventions complications  Plan: ORTHO  REFERRAL, CARE COORDINATION         " REFERRAL            (M75.102) Rotator cuff syndrome, left  Comment: Refer  Plan: [facility] HAND, AND CHIROPRACTIC REFERRAL, CARE         COORDINATION REFERRAL        Discussed natural history    (M75.101) Rotator cuff syndrome, right  Comment: Refer  Plan: [facility] PT, HAND, AND CHIROPRACTIC REFERRAL, CARE         COORDINATION REFERRAL        Discussed natural history    (M18.11,  M18.12) Osteoarthritis of thumbs, bilateral  Comment: Discussed natural history, treatment options he reports Voltaren gel in the past was effective  Plan: [facility], HAND, AND CHIROPRACTIC REFERRAL,         diclofenac (VOLTAREN) 1 % GEL topical gel, CARE        COORDINATION REFERRAL        He is interested in disability    (M54.5) Bilateral low back pain without sciatica, unspecified chronicity  Comment: Exam reassuring  Plan: CARE COORDINATION REFERRAL        He is interested in disability.  Actual status not yet delineated    (Z79.899) Controlled substance agreement signed  Comment: Today, given amphetamine therapies  Plan:     (K13.21) Leukoplakia of tongue  Comment: As well as a few other abnormalities  Plan: OTOLARYNGOLOGY REFERRAL        Formal assessment    RT for 6 weeksC   Leon Moss MD                  The information in this document, created by the medical scribe for me, accurately reflects the services I personally performed and the decisions made by me. I have reviewed and approved this document for accuracy prior to leaving the patient care area.  Leon Moss MD March 1, 2018 5:00 PM    Leon Moss MD  [facility]

## 2018-03-02 ENCOUNTER — CARE COORDINATION (OUTPATIENT)
Dept: CARE COORDINATION | Facility: CLINIC | Age: 58
End: 2018-03-02

## 2018-03-02 NOTE — PROGRESS NOTES
Clinic Care Coordination Contact  Care Team Conversations    CCSW connected with the patient.     He would like to discuss SSDI in person.    Made an appointment for 3/5/18 at 1pm at the  clinic.    Eileen Goins, Social Work Care Coordinator, [facility], MSW  [facility]: [place], [place], and [place]  P:[phone]/ Signed March 2, 2018

## 2018-03-05 NOTE — PROGRESS NOTES
Clinic Care Coordination Contact  OUTREACH    Referral Information:  Referral Source: PCP  Reason for Contact: initial: face to face in clinic  Care Conference: No     Universal Utilization:   ED Visits in last year: 0  Hospital visits in last year: 0  Last PCP appointment: 03/01/18    Clinical Concerns:  Current Medical Concerns:   Patient Active Problem List   Diagnosis     Attention deficit disorder     CARDIOVASCULAR SCREENING; LDL GOAL LESS THAN 160     Advanced directives, counseling/discussion     Controlled substance agreement signed     Dupuytren's contracture of both hands     Attention deficit disorder, unspecified hyperactivity presence     Rotator cuff syndrome, left     Rotator cuff syndrome, right     Osteoarthritis of thumbs, bilateral     Bilateral low back pain without sciatica, unspecified chronicity     Leukoplakia of tongue        Current Behavioral Concerns: ADHD    Education Provided to patient: Interested in disability. Previously was a  and helped load trucks. States it caused pain to his back and his hands have difficulty grasping.  Pt was let go in July 2017. Has not worked since. 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     Discussed the SSDI process and how to apply. Pt plans to follow up with therapy and specialty appointments to support his case. Also interested in getting psychological testing to test for learning disabilities and support his case for his ADHD diagnosis. Provided his with a hand out of disability specialists and disability linkage line.    Medication Management:  Patient manages     Functional Status:  Mobility Status: Independent w/Device  Transportation: not discussed     Psychosocial:  Current living arrangement:: Not Assessed  Financial/Insurance: Medicaid    Just moved to [facility] and is trying to get on his friends lease. Support daughter and son.   Gave him the application for SNAP  and other benefits.  Gave him information on local workforces and job finding information     Resources and Interventions:  Current Resources:  ;          Advanced Care Plans/Directives on file:: No  Referrals Placed: Disability Linkage line, Disability Specialists, County Resources, Community Resources    Barriers: ADHD  Strengths: supportive children  Patient/Caregiver understanding: fair - pt is interested in disability. He has not been seeking medical treatment for his disability(s) and has limited documentation to support his case at this time. Pt does have several appointments coming up to start physical therapy. Pt was tangential in conversation and would float between conversation topics. Difficult to fully answer his questions and confirm understanding. Pt appears motivated to get help but his full understanding of the SSDI process is unknown at this time.  Frequency of Care Coordination: monthly        PLAN:  Pt will outreach as needed  CCSW will follow up in 1 month    Eileen Goins, Social Work Care Coordinator, LGSW, MSW  [place] [facility]: [facility], and [place]   P:[phone]/ Signed March 5, 2018

## 2018-03-07 LAB — COMPREHEN DRUG ANALYSIS UR: NORMAL

## 2018-03-14 ENCOUNTER — OFFICE VISIT (OUTPATIENT)
Dept: ORTHOPEDICS | Facility: CLINIC | Age: 58
End: 2018-03-14
Payer: MEDICAID

## 2018-03-14 ENCOUNTER — RADIANT APPOINTMENT (OUTPATIENT)
Dept: GENERAL RADIOLOGY | Facility: CLINIC | Age: 58
End: 2018-03-14
Attending: ORTHOPAEDIC SURGERY
Payer: MEDICAID

## 2018-03-14 VITALS
DIASTOLIC BLOOD PRESSURE: 88 MMHG | HEIGHT: 71 IN | SYSTOLIC BLOOD PRESSURE: 152 MMHG | BODY MASS INDEX: 22.96 KG/M2 | WEIGHT: 164 LBS

## 2018-03-14 DIAGNOSIS — M18.0 PRIMARY OSTEOARTHRITIS OF BOTH FIRST CARPOMETACARPAL JOINTS: ICD-10-CM

## 2018-03-14 DIAGNOSIS — M79.642 BILATERAL HAND PAIN: Primary | ICD-10-CM

## 2018-03-14 DIAGNOSIS — M79.641 BILATERAL HAND PAIN: Primary | ICD-10-CM

## 2018-03-14 DIAGNOSIS — G56.03 BILATERAL CARPAL TUNNEL SYNDROME: ICD-10-CM

## 2018-03-14 DIAGNOSIS — M72.0 DUPUYTREN'S CONTRACTURE OF BOTH HANDS: ICD-10-CM

## 2018-03-14 DIAGNOSIS — M79.642 BILATERAL HAND PAIN: ICD-10-CM

## 2018-03-14 DIAGNOSIS — M79.641 BILATERAL HAND PAIN: ICD-10-CM

## 2018-03-14 PROCEDURE — 99244 OFF/OP CNSLTJ NEW/EST MOD 40: CPT | Performed by: ORTHOPAEDIC SURGERY

## 2018-03-14 PROCEDURE — 73130 X-RAY EXAM OF HAND: CPT | Mod: LT | Performed by: ORTHOPAEDIC SURGERY

## 2018-03-14 NOTE — PATIENT INSTRUCTIONS
After consideration, if you decided to have the surgery contact our office.  Otherwise return to clinic as needed.

## 2018-03-14 NOTE — MR AVS SNAPSHOT
"              After Visit Summary   3/14/2018    Parveen Flor    MRN: [id number]           Patient Information     Date Of Birth          1960        Visit Information        Provider Department      3/14/2018 8:20 AM Beau Reynoso MD [facility] ORTHOPEDIC SURGERY        Today's Diagnoses     Bilateral hand pain    -  1    Primary osteoarthritis of both first carpometacarpal joints        Bilateral carpal tunnel syndrome        Dupuytren's contracture of both hands          Care Instructions    After consideration, if you decided to have the surgery contact our office.  Otherwise return to clinic as needed.          Follow-ups after your visit        Who to contact     If you have questions or need follow up information about today's clinic visit or your schedule please contact [facility] ORTHOPEDIC SURGERY directly at [phone].  Normal or non-critical lab and imaging results will be communicated to you by MyChart, letter or phone within 4 business days after the clinic has received the results. If you do not hear from us within 7 days, please contact the clinic through MyChart or phone. If you have a critical or abnormal lab result, we will notify you by phone as soon as possible.  Submit refill requests through [employer] or call your pharmacy and they will forward the refill request to us. Please allow 3 business days for your refill to be completed.          Additional Information About Your Visit        MyChart Information     [employer]ler lets you send messages to your doctor, view your test results, renew your prescriptions, schedule appointments and more. To sign up, go to [email or web address] . Click on \"Log in\" on the left side of the screen, which will take you to the Welcome page. Then click on \"Sign up Now\" on the right side of the page.     You will be asked to enter the access code listed below, as well as some personal information. Please follow the directions to create " "your username and password.     Your access code is: [email or web address]  Expires: 2018  6:24 PM     Your access code will  in 90 days. If you need help or a new code, please call your Mobile clinic or [phone].        Care EveryWhere ID     This is your Care EveryWhere ID. This could be used by other organizations to access your Mobile medical records  [id number]        Your Vitals Were     Height BMI (Body Mass Index)                5' 11\" (1.803 m) 22.87 kg/m2           Blood Pressure from Last 3 Encounters:   18 152/88   18 130/75   17 120/80    Weight from Last 3 Encounters:   18 164 lb (74.4 kg)   18 164 lb (74.4 kg)   17 157 lb (71.2 kg)               Primary Care Provider Office Phone # Fax #    Leon Moss [phone] [address] [facility] [address]        Equal Access to Services     [facility]: Hadii nancy ku hadasho Soomaali, waaxda luqadaha, qaybta kaalmada edelmira, sheela cruz . So [facility] [phone].    ATENCIÓN: Si habla español, tiene a rogers disposición servicios gratuitos de asistencia lingüística. [facility] [phone].    We comply with applicable federal civil rights laws and [place] laws. We do not discriminate on the basis of race, color, national origin, age, disability, sex, sexual orientation, or gender identity.            Thank you!     Thank you for choosing [facility] ORTHOPEDIC SURGERY  for your care. Our goal is always to provide you with excellent care. Hearing back from our patients is one way we can continue to improve our services. Please take a few minutes to complete the written survey that you may receive in the mail after your visit with us. Thank you!             Your Updated Medication List - Protect others around you: Learn how to safely use, store and throw away your medicines at www.disposemymeds.org.          This list is accurate as of 3/14/18  9:10 AM.  " Always use your most recent med list.                   Brand Name Dispense Instructions for use Diagnosis    diclofenac 1 % Gel topical gel    VOLTAREN    100 g    Place onto the skin 4 times daily    Osteoarthritis of thumbs, bilateral       IBUPROFEN PO           methylphenidate 10 MG tablet   Start taking on:  4/30/2018    RITALIN    150 tablet    2 am, 2 mid day, 1 at hs    Attention deficit disorder, unspecified hyperactivity presence

## 2018-03-14 NOTE — PROGRESS NOTES
HISTORY OF PRESENT ILLNESS:    Parveen Flor is a 57 year old male who is seen in consultation at the request of Dr. Moss for bilateral hands, dupuytren's contracture.    Present symptoms: Pt states hand pain has been present for several years, has some visible contracture of the 5th finger flexor tendon on both hands, also complains of pain at the thumb CMC joints as well as pain in his fingers.  Pt has more difficulty with pinching motions, some weakness in his hands as he reports dropping things more frequently.   He used to work loading trucks. When he was working, he would wake up at night with pain and paresthesia symptoms. Since July 2017, he has not worked.For that reason, he has not had episodes of waking up at night. He is right-hand dominant. Symptoms are slightly worse on the left.  Treatments tried to this point: stretching, ibuprofen  Orthopedic PMH: no surgeries     Past Medical History:   Diagnosis Date     Attention deficit disorder, unspecified hyperactivity presence 3/1/2018     Bilateral low back pain without sciatica, unspecified chronicity 3/1/2018     Chemical dependency (H)     Sober x 7 years.     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    Family History   Problem Relation Age of Onset     HEART DISEASE Paternal Grandmother      Arthritis Paternal Grandmother      Alcohol/Drug Maternal Grandfather      CANCER Maternal Grandmother      Liver       Social History     Social History     Marital status:      Spouse name: N/A     Number of children: N/A     Years of education: N/A     Occupational History     Not on file.     Social History Main Topics     Smoking status: Former Smoker     Packs/day: 0.50     Years: 4.00      Types: Cigarettes     Quit date: 11/2017     Smokeless tobacco: Never Used     Alcohol use No     Drug use: No      Comment: clean 18 months     Sexual activity: Yes     Partners: Female     Other Topics Concern     Parent/Sibling W/ Cabg, Mi Or Angioplasty Before 65f 55m? No     Social History Narrative    Social Documentation:        Balanced Diet: YES    Calcium intake: 1 serving every other day    Caffeine: 1 pot of coffee per day    Exercise:  type of activity, very active at current job    Sunscreen: No -     Seatbelts:  Yes    Self Breast Exam:  No - n/a    Self Testicular Exam: No - needs information    Physical/Emotional/Sexual Abuse: No -     Do you feel safe in your environment? Yes        Cholesterol screen up to date: 02/26/03-WNL    Eye Exam up to date: Yes    Dental Exam up to date: Yes    Pap smear up to date: Does Not Apply    Mammogram up to date: Does Not Apply    Dexa Scan up to date: Does Not Apply    Colonoscopy up to date:  Pt thinks he had one 5-6 years ago with normal results    Immunizations up to date: Pt unsure about last Td    Glucose screen if over 40:  Yes-02/26/03-WNL       Current Outpatient Prescriptions   Medication Sig Dispense Refill     IBUPROFEN PO        diclofenac (VOLTAREN) 1 % GEL topical gel Place onto the skin 4 times daily 100 g 3     [START ON 4/30/2018] methylphenidate (RITALIN) 10 MG tablet 2 am, 2 mid day, 1 at hs 150 tablet 0       Allergies   Allergen Reactions     No Known Drug Allergy        REVIEW OF SYSTEMS:  CONSTITUTIONAL:  NEGATIVE for fever, chills, change in weight  INTEGUMENTARY/SKIN:  NEGATIVE for worrisome rashes, moles or lesions  EYES:  NEGATIVE for vision changes or irritation  ENT/MOUTH:  NEGATIVE for ear, mouth and throat problems  RESP:  NEGATIVE for significant cough or SOB  BREAST:  NEGATIVE for masses, tenderness or discharge  CV:  NEGATIVE for chest pain, palpitations or peripheral edema  GI:  NEGATIVE for nausea, abdominal pain,  "heartburn, or change in bowel habits  :  Negative   MUSCULOSKELETAL:  See HPI above  NEURO:  paresthesias  ENDOCRINE:  NEGATIVE for temperature intolerance, skin/hair changes  HEME/ALLERGY/IMMUNE:  NEGATIVE for bleeding problems  PSYCHIATRIC:  NEGATIVE for changes in mood or affect      PHYSICAL EXAM:  /88 (BP Location: Right arm, Patient Position: Sitting, Cuff Size: Adult Regular)  Ht 5' 11\" (1.803 m)  Wt 164 lb (74.4 kg)  BMI 22.87 kg/m2  Body mass index is 22.87 kg/(m^2).   GENERAL APPEARANCE: healthy, alert and no distress   HEENT: No apparent thyroid megaly. Clear sclera with normal ocular movement  RESPIRATORY: No labored breathing  SKIN: no suspicious lesions or rashes  NEURO: Normal strength and tone, mentation intact and speech normal  VASCULAR: Good pulses, and capillary refill   LYMPH: no lymphadenopathy   PSYCH:  mentation appears normal and affect normal/bright    MUSCULOSKELETAL:  Normal gait  No erythema, warmth  Or ecchymosis in hands  Wrist range of motion is symmetrical and full  No focal atrophy of the thenar and hypothenar eminences  Prominent thumb CMC joints, worse on the left  More noticeable crepitus at the CMC joint of the thumbs with the pain related to circumduction maneuver  Slightly prominent MCP joints of the index and long fingers, right  No catching or locking with a finger movement and thumb movement  No tenderness at the A1 pulleys throughout the digits, bilateral  Presence of Dupuytren's contracture involving the little finger ray in the palm and slightly the ring finger ray. Contractures worse on the right hand.  However, he  can still extend the MCP joints fully   Slightly decreased sensation of thumb and index finger, bilateral compared to the little finger        ASSESSMENT:  Multiple bilateral hand issues including:  Advanced thumb CMC joint DJD  Bilateral Dupuytren's contracture  Bilateral carpal tunnel syndrome        PLAN:  We had a long discussion about the " issues in both hands as mentioned above. The predominant problem is felt to be that of thumb CMC joint DJD, worse on the left not only on the x-rays but also in terms of his symptoms. We talked about the treatment options of observation versus splinting versus eventual surgical intervention of thumb CMC joint arthroplasty. He was informed of the nature of the surgery, potential risks, possibility persisting pain and general recovery time as well as six weeks of immobilization anticipated. We also talked about the nature of carpal tunnel syndrome which can be addressed if he decided to have the thumb surgery.  The right hand Dupuytren's contractures getting near to the point of considering intervention.  That may be something that can be considered at at the time of the thumb CMC joint arthroplasty as well.  I advised him to consider our discussion and if he decided to go ahead with surgical intervention most likely the left one first, he'll contact our office.  All the questions Were answered    Imaging Interpretation:  Bilateral thumb CMC joint DJD and MCP joint DJD    Beau Reynoso MD  Department of Orthopedic Surgery        Disclaimer: This note consists of symbols derived from keyboarding, dictation and/or voice recognition software. As a result, there may be errors in the script that have gone undetected. Please consider this when interpreting information found in this chart.

## 2018-03-14 NOTE — LETTER
3/14/2018         RE: Parveen Flor  [address]  [place] [address]        Dear Colleague,    Thank you for referring your patient, Parveen Flor, to the [facility] ORTHOPEDIC SURGERY. Please see a copy of my visit note below.    HISTORY OF PRESENT ILLNESS:    Parveen Flor is a 57 year old male who is seen in consultation at the request of Dr. Moss for bilateral hands, dupuytren's contracture.    Present symptoms: Pt states hand pain has been present for several years, has some visible contracture of the 5th finger flexor tendon on both hands, also complains of pain at the thumb CMC joints as well as pain in his fingers.  Pt has more difficulty with pinching motions, some weakness in his hands as he reports dropping things more frequently.   He used to work loading trucks. When he was working, he would wake up at night with pain and paresthesia symptoms. Since July 2017, he has not worked.For that reason, he has not had episodes of waking up at night. He is right-hand dominant. Symptoms are slightly worse on the left.  Treatments tried to this point: stretching, ibuprofen  Orthopedic PMH: no surgeries     Past Medical History:   Diagnosis Date     Attention deficit disorder, unspecified hyperactivity presence 3/1/2018     Bilateral low back pain without sciatica, unspecified chronicity 3/1/2018     Chemical dependency (H)     Sober x 7 years.     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    Family History   Problem Relation Age of Onset     HEART DISEASE Paternal Grandmother      Arthritis Paternal Grandmother      Alcohol/Drug Maternal Grandfather      CANCER Maternal Grandmother      Liver       Social History      Social History     Marital status:      Spouse name: N/A     Number of children: N/A     Years of education: N/A     Occupational History     Not on file.     Social History Main Topics     Smoking status: Former Smoker     Packs/day: 0.50     Years: 4.00     Types: Cigarettes     Quit date: 11/2017     Smokeless tobacco: Never Used     Alcohol use No     Drug use: No      Comment: clean 18 months     Sexual activity: Yes     Partners: Female     Other Topics Concern     Parent/Sibling W/ Cabg, Mi Or Angioplasty Before 65f 55m? No     Social History Narrative    Social Documentation:        Balanced Diet: YES    Calcium intake: 1 serving every other day    Caffeine: 1 pot of coffee per day    Exercise:  type of activity, very active at current job    Sunscreen: No -     Seatbelts:  Yes    Self Breast Exam:  No - n/a    Self Testicular Exam: No - needs information    Physical/Emotional/Sexual Abuse: No -     Do you feel safe in your environment? Yes        Cholesterol screen up to date: 02/26/03-WNL    Eye Exam up to date: Yes    Dental Exam up to date: Yes    Pap smear up to date: Does Not Apply    Mammogram up to date: Does Not Apply    Dexa Scan up to date: Does Not Apply    Colonoscopy up to date:  Pt thinks he had one 5-6 years ago with normal results    Immunizations up to date: Pt unsure about last Td    Glucose screen if over 40:  Yes-02/26/03-WNL       Current Outpatient Prescriptions   Medication Sig Dispense Refill     IBUPROFEN PO        diclofenac (VOLTAREN) 1 % GEL topical gel Place onto the skin 4 times daily 100 g 3     [START ON 4/30/2018] methylphenidate (RITALIN) 10 MG tablet 2 am, 2 mid day, 1 at hs 150 tablet 0       Allergies   Allergen Reactions     No Known Drug Allergy        REVIEW OF SYSTEMS:  CONSTITUTIONAL:  NEGATIVE for fever, chills, change in weight  INTEGUMENTARY/SKIN:  NEGATIVE for worrisome rashes, moles or lesions  EYES:  NEGATIVE for vision changes or  "irritation  ENT/MOUTH:  NEGATIVE for ear, mouth and throat problems  RESP:  NEGATIVE for significant cough or SOB  BREAST:  NEGATIVE for masses, tenderness or discharge  CV:  NEGATIVE for chest pain, palpitations or peripheral edema  GI:  NEGATIVE for nausea, abdominal pain, heartburn, or change in bowel habits  :  Negative   MUSCULOSKELETAL:  See HPI above  NEURO:  paresthesias  ENDOCRINE:  NEGATIVE for temperature intolerance, skin/hair changes  HEME/ALLERGY/IMMUNE:  NEGATIVE for bleeding problems  PSYCHIATRIC:  NEGATIVE for changes in mood or affect      PHYSICAL EXAM:  /88 (BP Location: Right arm, Patient Position: Sitting, Cuff Size: Adult Regular)  Ht 5' 11\" (1.803 m)  Wt 164 lb (74.4 kg)  BMI 22.87 kg/m2  Body mass index is 22.87 kg/(m^2).   GENERAL APPEARANCE: healthy, alert and no distress   HEENT: No apparent thyroid megaly. Clear sclera with normal ocular movement  RESPIRATORY: No labored breathing  SKIN: no suspicious lesions or rashes  NEURO: Normal strength and tone, mentation intact and speech normal  VASCULAR: Good pulses, and capillary refill   LYMPH: no lymphadenopathy   PSYCH:  mentation appears normal and affect normal/bright    MUSCULOSKELETAL:  Normal gait  No erythema, warmth  Or ecchymosis in hands  Wrist range of motion is symmetrical and full  No focal atrophy of the thenar and hypothenar eminences  Prominent thumb CMC joints, worse on the left  More noticeable crepitus at the CMC joint of the thumbs with the pain related to circumduction maneuver  Slightly prominent MCP joints of the index and long fingers, right  No catching or locking with a finger movement and thumb movement  No tenderness at the A1 pulleys throughout the digits, bilateral  Presence of Dupuytren's contracture involving the little finger ray in the palm and slightly the ring finger ray. Contractures worse on the right hand.  However, he  can still extend the MCP joints fully   Slightly decreased sensation of " thumb and index finger, bilateral compared to the little finger        ASSESSMENT:  Multiple bilateral hand issues including:  Advanced thumb CMC joint DJD  Bilateral Dupuytren's contracture  Bilateral carpal tunnel syndrome        PLAN:  We had a long discussion about the issues in both hands as mentioned above. The predominant problem is felt to be that of thumb CMC joint DJD, worse on the left not only on the x-rays but also in terms of his symptoms. We talked about the treatment options of observation versus splinting versus eventual surgical intervention of thumb CMC joint arthroplasty. He was informed of the nature of the surgery, potential risks, possibility persisting pain and general recovery time as well as six weeks of immobilization anticipated. We also talked about the nature of carpal tunnel syndrome which can be addressed if he decided to have the thumb surgery.  The right hand Dupuytren's contractures getting near to the point of considering intervention.  That may be something that can be considered at at the time of the thumb CMC joint arthroplasty as well.  I advised him to consider our discussion and if he decided to go ahead with surgical intervention most likely the left one first, he'll contact our office.  All the questions Were answered    Imaging Interpretation:  Bilateral thumb CMC joint DJD and MCP joint DJD    Beau Reynoso MD  Department of Orthopedic Surgery        Disclaimer: This note consists of symbols derived from keyboarding, dictation and/or voice recognition software. As a result, there may be errors in the script that have gone undetected. Please consider this when interpreting information found in this chart.      Again, thank you for allowing me to participate in the care of your patient.        Sincerely,        Beau Reynoso MD

## 2018-03-14 NOTE — NURSING NOTE
"Chief Complaint   Patient presents with     Hand Pain     bilateral hand pain       Initial /88 (BP Location: Right arm, Patient Position: Sitting, Cuff Size: Adult Regular)  Ht 5' 11\" (1.803 m)  Wt 164 lb (74.4 kg)  BMI 22.87 kg/m2 Estimated body mass index is 22.87 kg/(m^2) as calculated from the following:    Height as of this encounter: 5' 11\" (1.803 m).    Weight as of this encounter: 164 lb (74.4 kg).  Medication Reconciliation: complete    "

## 2018-03-21 ENCOUNTER — TRANSFERRED RECORDS (OUTPATIENT)
Dept: HEALTH INFORMATION MANAGEMENT | Facility: CLINIC | Age: 58
End: 2018-03-21

## 2018-03-29 ENCOUNTER — THERAPY VISIT (OUTPATIENT)
Dept: PHYSICAL THERAPY | Facility: CLINIC | Age: 58
End: 2018-03-29
Payer: MEDICAID

## 2018-03-29 ENCOUNTER — THERAPY VISIT (OUTPATIENT)
Dept: OCCUPATIONAL THERAPY | Facility: CLINIC | Age: 58
End: 2018-03-29
Payer: MEDICAID

## 2018-03-29 DIAGNOSIS — M79.644 BILATERAL THUMB PAIN: Primary | ICD-10-CM

## 2018-03-29 DIAGNOSIS — M18.11 PRIMARY OSTEOARTHRITIS OF FIRST CARPOMETACARPAL JOINT OF RIGHT HAND: ICD-10-CM

## 2018-03-29 DIAGNOSIS — M25.512 BILATERAL SHOULDER PAIN, UNSPECIFIED CHRONICITY: Primary | ICD-10-CM

## 2018-03-29 DIAGNOSIS — M25.511 BILATERAL SHOULDER PAIN, UNSPECIFIED CHRONICITY: Primary | ICD-10-CM

## 2018-03-29 DIAGNOSIS — M79.645 BILATERAL THUMB PAIN: Primary | ICD-10-CM

## 2018-03-29 DIAGNOSIS — M18.12 PRIMARY OSTEOARTHRITIS OF FIRST CARPOMETACARPAL JOINT OF LEFT HAND: ICD-10-CM

## 2018-03-29 PROCEDURE — 97110 THERAPEUTIC EXERCISES: CPT | Mod: GP | Performed by: PHYSICAL THERAPIST

## 2018-03-29 PROCEDURE — 97110 THERAPEUTIC EXERCISES: CPT | Mod: GO | Performed by: OCCUPATIONAL THERAPIST

## 2018-03-29 PROCEDURE — 97165 OT EVAL LOW COMPLEX 30 MIN: CPT | Mod: GO | Performed by: OCCUPATIONAL THERAPIST

## 2018-03-29 PROCEDURE — 97140 MANUAL THERAPY 1/> REGIONS: CPT | Mod: GO | Performed by: OCCUPATIONAL THERAPIST

## 2018-03-29 PROCEDURE — 97161 PT EVAL LOW COMPLEX 20 MIN: CPT | Mod: GP | Performed by: PHYSICAL THERAPIST

## 2018-03-29 NOTE — LETTER
DEPARTMENT OF HEALTH AND HUMAN SERVICES  CENTERS FOR MEDICARE & MEDICAID SERVICES    PLAN/UPDATED PLAN OF PROGRESS FOR OUTPATIENT REHABILITATION    PATIENTS NAME:  Parveen Flor   : 1960  PROVIDER NUMBER:    [id number]  [id number]  PROVIDER NAME: [facility]  MEDICAL RECORD NUMBER: [id number]     START OF CARE DATE:  SOC Date: 18   TYPE:  PT    PRIMARY/TREATMENT DIAGNOSIS: (Pertinent Medical Diagnosis)  Bilateral shoulder pain, unspecified chronicity    VISITS FROM START OF CARE:  Rxs Used: 1     [facility] Initial Evaluation  Subjective:  Patient is a 57 year old male presenting with rehab right shoulder hpi. The history is provided by the patient. No  was used.   Parveen Flor is a 57 year old male with a bilateral shoulders (R>L) condition.  Condition occurred with:  Repetition/overuse.  Condition occurred: in the community.  This is a chronic condition  History of bilateral shoulder pain for over a year secondary to repetitive lifting. Pt referred by MD for physical therapy on 3-1-18.    Patient reports pain:  Anterior.    Pain is described as aching and sharp and is intermittent and reported as 6/10.  Associated symptoms:  Loss of motion/stiffness, loss of strength and tingling. Pain is worse during the night.  Symptoms are exacerbated by lying on extremity, using arm overhead, using arm behind back, lifting and carrying and relieved by rest and NSAID's.  Since onset symptoms are unchanged.  Special testing: none.  Previous treatment: none.      Pertinent medical history includes:  None.  Medical allergies: no.  Other surgeries include:  None reported.  Medication history: ritalin.  Current occupation is Not working.      Barriers include:  None as reported by the patient.  Red flags:  None as reported by the patient.    Objective:  Standing Alignment:    Shoulder/upper extremity deviations alignment: forward shoulder  posture.  Shoulder Evaluation:  ROM:  AROM:    Flexion:  Left:  120    Right:  150  Extension: Left: 50Right: 40  Abduction:  Left: 120   Right:  140  External Rotation:  Left:  50    Right:  60  PROM:    Flexion:  Left:  150    Right: 160    Extension:  Left:  60    Right:  50  Abduction:  Left:  150    Right:  160  Internal Rotation:  Left:  60    Right:  60  External Rotation:  Left:  60    Right:  70  Parveen Flor   : 1960    Strength:    Flexion: Left:4/5   Pain:    Right: 4/5     Pain:   Extension:  Left: 4+/5    Pain:    Right: 4+/5    Pain:  Abduction:  Left: 4-/5  Pain:    Right: 4-/5     Pain:  Adduction:  Left: 4/5    Pain:    Right: 4/5     Pain:  Internal Rotation:  Left:4/5     Pain:    Right: 4/5     Pain:  External Rotation:   Left:4-/5     Pain:   Right:4-/5     Pain:    Special Tests:    Left shoulder positive for the following special tests:  Impingement  Right shoulder positive for the following special tests:Impingement  Palpation:    Left shoulder tenderness present at:  Supraspinatus  Right shoulder tenderness present at: Supraspinatus    Assessment/Plan:    Patient is a 57 year old male with both sides shoulder complaints.    Patient has the following significant findings with corresponding treatment plan.                Diagnosis 1:  biltateral RC impingment  Pain -  hot/cold therapy, manual therapy, self management, education and home program  Decreased ROM/flexibility - manual therapy, therapeutic exercise, therapeutic activity and home program  Decreased strength - therapeutic exercise, therapeutic activities and home program    Therapy Evaluation Codes:   1) History comprised of:   Personal factors that impact the plan of care:      None.    Comorbidity factors that impact the plan of care are:      Weakness.     Medications impacting care: ridaling.  2) Examination of Body Systems comprised of:   Body structures and functions that impact the plan of care:   "    Shoulder.   Activity limitations that impact the plan of care are:      Dressing, Lifting, Sleeping and Laying down.  3) Clinical presentation characteristics are:   Stable/Uncomplicated.  4) Decision-Making    Low complexity using standardized patient assessment instrument and/or measureable assessment of functional outcome.  Cumulative Therapy Evaluation is: Low complexity.    Previous and current functional limitations:  (See Goal Flow Sheet for this information)    Short term and Long term goals: (See Goal Flow Sheet for this information)               Chacho Parveen   : 1960    Communication ability:  Patient appears to be able to clearly communicate and understand verbal and written communication and follow directions correctly.  Treatment Explanation - The following has been discussed with the patient:   RX ordered/plan of care  Anticipated outcomes  Possible risks and side effects  This patient would benefit from PT intervention to resume normal activities.   Rehab potential is good.    Frequency:  1 X week, once daily  Duration:  for 6 weeks  Discharge Plan:  Achieve all LTG.  Independent in home treatment program.  Reach maximal therapeutic benefit.        Caregiver Signature/Credentials _____________________________ Date ________           Treating Provider: Jose William, PT    I have reviewed and certified the need for these services and plan of treatment while under my care.        PHYSICIAN'S SIGNATURE:   ______________________________________ Date___________     Leon Moss MD    Certification period:  Beginning of Cert date period: 18 to  End of Cert period date: 18     Functional Level Progress Report: Please see attached \"Goal Flow sheet for Functional level.\"    ____X____ Continue Services or       ________ DC Services                Service dates: From  SOC Date: 18 date to present                         "

## 2018-03-29 NOTE — MR AVS SNAPSHOT
After Visit Summary   3/29/2018    Parveen Flor    MRN: [id number]           Patient Information     Date Of Birth          1960        Visit Information        Provider Department      3/29/2018 9:30 AM Eileen Benitez OT [facility]        Today's Diagnoses     Bilateral thumb pain    -  1    Primary osteoarthritis of first carpometacarpal joint of left hand        Primary osteoarthritis of first carpometacarpal joint of right hand           Follow-ups after your visit        Your next 10 appointments already scheduled     Mar 29, 2018  2:00 PM CDT   (Arrive by 1:45 PM)   [facility] Extremity with Jose William, PT   [facility] ([facility] [place]  )    [address] [address] [facility]  [address] [place] [address]   [phone]            Apr 05, 2018  9:00 AM CDT   [facility] Hand with KALIA AlvaresM [facility] ([facility] [place]  )    [address] [address] [facility]  [address] [place] [address]   [phone]            Apr 12, 2018  4:00 PM CDT   [facility] Hand with Eileen Benitez OT   [facility] ([facility] [place]  )    [address] [facility]  [address] [place] [address]   [phone]              Who to contact     If you have questions or need follow up information about today's clinic visit or your schedule please contact [facility] directly at [phone].  Normal or non-critical lab and imaging results will be communicated to you by MyChart, letter or phone within 4 business days after the clinic has received the results. If you do not hear from us within 7 days, please contact the clinic through [employer] or phone. If you have a critical or abnormal lab result, we will notify you by phone as soon as possible.  Submit refill requests through [employer] or call your pharmacy and they will forward the refill request to us. Please allow 3 business days for your refill to be completed.          Additional Information About Your Visit        MyChart Information   "   [employer] lets you send messages to your doctor, view your test results, renew your prescriptions, schedule appointments and more. To sign up, go to [email or web address] . Click on \"Log in\" on the left side of the screen, which will take you to the Welcome page. Then click on \"Sign up Now\" on the right side of the page.     You will be asked to enter the access code listed below, as well as some personal information. Please follow the directions to create your username and password.     Your access code is: [email or web address]  Expires: 2018  6:24 PM     Your access code will  in 90 days. If you need help or a new code, please call your [facility] clinic or [phone].        Care EveryWhere ID     This is your Care EveryWhere ID. This could be used by other organizations to access your [facility] medical records  [id number]         Blood Pressure from Last 3 Encounters:   18 152/88   18 130/75   17 120/80    Weight from Last 3 Encounters:   18 74.4 kg (164 lb)   18 74.4 kg (164 lb)   17 71.2 kg (157 lb)              We Performed the Following     HC OT EVAL, LOW COMPLEXITY     [facility] CERT REPORT     MANUAL THER TECH,1+REGIONS,EA 15 MIN     THERAPEUTIC EXERCISES        Primary Care Provider Office Phone # Fax #    Leon Moss [phone] [address] [facility] [address]        Equal Access to Services     NANDA CHEATHAM : Hadii nancy ku hadasho Soabhijitali, waaxda luqadaha, qaybta kaalmada edelmira, sheela hewitt. So [facility] [phone].    ATENCIÓN: Si habla español, tiene a rogers disposición servicios gratuitos de asistencia lingüística. Llame al [phone].    We comply with applicable federal civil rights laws and [place] laws. We do not discriminate on the basis of race, color, national origin, age, disability, sex, sexual orientation, or gender identity.            Thank you!     Thank you for choosing [facility]  for your care. Our goal is always to provide you with excellent care. Hearing back from our patients is one way we can continue to improve our services. Please take a few minutes to complete the written survey that you may receive in the mail after your visit with us. Thank you!             Your Updated Medication List - Protect others around you: Learn how to safely use, store and throw away your medicines at www.disposemymeds.org.          This list is accurate as of 3/29/18 10:33 AM.  Always use your most recent med list.                   Brand Name Dispense Instructions for use Diagnosis    diclofenac 1 % Gel topical gel    VOLTAREN    100 g    Place onto the skin 4 times daily    Osteoarthritis of thumbs, bilateral       IBUPROFEN PO           methylphenidate 10 MG tablet   Start taking on:  4/30/2018    RITALIN    150 tablet    2 am, 2 mid day, 1 at hs    Attention deficit disorder, unspecified hyperactivity presence

## 2018-03-29 NOTE — PROGRESS NOTES
[facility] Initial Evaluation  Subjective:  Patient is a 57 year old male presenting with rehab right shoulder hpi. The history is provided by the patient. No  was used.   Parveen Flor is a 57 year old male with a bilateral shoulders (R>L) condition.  Condition occurred with:  Repetition/overuse.  Condition occurred: in the community.  This is a chronic condition  History of bilateral shoulder pain for over a year secondary to repetitive lifting. Pt referred by MD for physical therapy on 3-1-18.    Patient reports pain:  Anterior.    Pain is described as aching and sharp and is intermittent and reported as 6/10.  Associated symptoms:  Loss of motion/stiffness, loss of strength and tingling. Pain is worse during the night.  Symptoms are exacerbated by lying on extremity, using arm overhead, using arm behind back, lifting and carrying and relieved by rest and NSAID's.  Since onset symptoms are unchanged.  Special testing: none.  Previous treatment: none.      Pertinent medical history includes:  None.  Medical allergies: no.  Other surgeries include:  None reported.  Medication history: ritalin.  Current occupation is Not working.        Barriers include:  None as reported by the patient.    Red flags:  None as reported by the patient.                        Objective:  Standing Alignment:      Shoulder/upper extremity deviations alignment: forward shoulder posture.                                       Shoulder Evaluation:  ROM:  AROM:    Flexion:  Left:  120    Right:  150  Extension: Left: 50Right: 40  Abduction:  Left: 120   Right:  140      External Rotation:  Left:  50    Right:  60                PROM:    Flexion:  Left:  150    Right: 160    Extension:  Left:  60    Right:  50  Abduction:  Left:  150    Right:  160    Internal Rotation:  Left:  60    Right:  60  External Rotation:  Left:  60    Right:  70                    Strength:    Flexion: Left:4/5   Pain:     Right: 4/5     Pain:   Extension:  Left: 4+/5    Pain:    Right: 4+/5    Pain:  Abduction:  Left: 4-/5  Pain:    Right: 4-/5     Pain:  Adduction:  Left: 4/5    Pain:    Right: 4/5     Pain:  Internal Rotation:  Left:4/5     Pain:    Right: 4/5     Pain:  External Rotation:   Left:4-/5     Pain:   Right:4-/5     Pain:              Special Tests:    Left shoulder positive for the following special tests:  Impingement  Right shoulder positive for the following special tests:Impingement  Palpation:    Left shoulder tenderness present at:  Supraspinatus  Right shoulder tenderness present at: Supraspinatus                                     General     ROS    Assessment/Plan:    Patient is a 57 year old male with both sides shoulder complaints.    Patient has the following significant findings with corresponding treatment plan.                Diagnosis 1:  biltateral RC impingment  Pain -  hot/cold therapy, manual therapy, self management, education and home program  Decreased ROM/flexibility - manual therapy, therapeutic exercise, therapeutic activity and home program  Decreased strength - therapeutic exercise, therapeutic activities and home program    Therapy Evaluation Codes:   1) History comprised of:   Personal factors that impact the plan of care:      None.    Comorbidity factors that impact the plan of care are:      Weakness.     Medications impacting care: ridaling.  2) Examination of Body Systems comprised of:   Body structures and functions that impact the plan of care:      Shoulder.   Activity limitations that impact the plan of care are:      Dressing, Lifting, Sleeping and Laying down.  3) Clinical presentation characteristics are:   Stable/Uncomplicated.  4) Decision-Making    Low complexity using standardized patient assessment instrument and/or measureable assessment of functional outcome.  Cumulative Therapy Evaluation is: Low complexity.    Previous and current functional limitations:  (See Goal  Flow Sheet for this information)    Short term and Long term goals: (See Goal Flow Sheet for this information)     Communication ability:  Patient appears to be able to clearly communicate and understand verbal and written communication and follow directions correctly.  Treatment Explanation - The following has been discussed with the patient:   RX ordered/plan of care  Anticipated outcomes  Possible risks and side effects  This patient would benefit from PT intervention to resume normal activities.   Rehab potential is good.    Frequency:  1 X week, once daily  Duration:  for 6 weeks  Discharge Plan:  Achieve all LTG.  Independent in home treatment program.  Reach maximal therapeutic benefit.    Please refer to the daily flowsheet for treatment today, total treatment time and time spent performing 1:1 timed codes.

## 2018-03-29 NOTE — PROGRESS NOTES
Hand Therapy Initial Evaluation  Current Date:  3/29/2018    Subjective:  Parveen Flor is a 57 year old right hand dominant male.    Diagnosis: Bilateral thumb OA  DOI:  3/1/18 MD order date      Patient reports symptoms of pain, stiffness/loss of motion, weakness/loss of strength, edema, numbness and tingling  of Bilateral hands which occurred due to unknown cause. Since onset symptoms are Gradually getting worse. Special tests:  x-ray.  Previous treatment: None. General health as reported by patient is good.  Pertinent medical history includes: osteoarthritis, pain at rest/night.  Medical allergies: none.  Surgical history: none.  Medication history: Ridlin.      Occupational Profile Information:  Current occupation is Unemployed  Currently not working due to present treatment problem  Prior functional level:  no limitations  Barriers include:none  Mobility: No difficulty  Transportation: drives  Leisure activities/hobbies: Foosball, drumming, drawing    Upper Extremity Functional Index Score:    Upper Extremity Functional Index Score:  SCORE:   Column Totals: /80: 35   (A lower score indicates greater disability.)            Objective:  Pain Level Report: On scale 0-10/10  Date 3/29/2018    side R L   Overall 8 7.5   At Rest 7 6.5   With Activity 9 8.5     Report of Pain:  Location: Bilateral thumbs  Pain Quality:  Stabbing  Frequency: constant    Pain is worst:  daytime  Exacerbated by:  Pinching, hand use, buttoning, zipping  Relieved by:  rest  Progression:  Gradually getting worse      Tenderness:  Date 3/29/2018   R CMC of the Thumb +   L CMC of the Thumb +     Appearance:  Date 3/29/2018 3/29/18   Side R L   Shoulder deformity is present over the CMC + +   Volar subluxation present - -   Edema over the CMC joint - -   Noted collapse of MP into hyperextension during pinch - -     Thumb Range of Motion:  AROM (PROM)  Date 3/29/2018 3/29/2018   Side: R L   MCP ext -35 -20   MCP flex 70 62   IP ext 0 0    IP flex 56 55   RABD 45 50   PABD 45 46     Special Tests:  Date 3/29/2018 3/29/18   Side R L   Grind + +   Passive Retroposition + +     Strength:   (Measured in pounds)  Pain Report:  - none    + mild    ++ moderate    +++ severe     3/29/2018   Trials R L   1  2  3 51 43   Average:       Lat Pinch  3/29/2018   Trials R L   1  2  3 3 4   Average:       3 Pt Pinch  3/29/2018   Trials R L   1  2  3 4 6   Average:         Assessment:  Patient presents with symptoms consistent with diagnosis of bilateral CMC thumb arthritis, with conservative intervention.    Patient s limitations or Problem List includes: Pain, Decreased ROM/motion, weakness, decreased stability of the CMC joint, decreased  and pinch strength of the thumb which interferes with patients ability to perform  Self Care Tasks (dressing), Work Tasks, Sleep Patterns, Recreational Activities, Household Chores and Driving   as compared to previous level of function.    Rehab Potential: Good- Return to full activity, some limitations.    Patient will benefit from skilled Occupational Therapy to increase ROM, flexibility, pinch strength, and stability of the thumb and decrease pain to return to previous activity level and resume normal daily tasks and to reach their rehab potential.    Barriers to Learning:  No barrier    Communication Issues: Patient appears to be able to clearly communicate and understand verbal and written communication and follow directions correctly.    Chart Review: Chart Review and Brief history including review of medical and/or therapy records relating to the presenting problem    Identified Performance Deficits: dressing, feeding, driving and community mobility, health management and maintenance, home establishment and management, meal preparation and cleanup, sleep, work and leisure activities    Assessment of Occupational Performance:  5 or more Performance Deficits     Clinical Decision Making (Complexity): Low  complexity    Treatment Explanations:  The following has been discussed with the patient,  Rx ordered/plan of care  Anticipated outcomes  Possible risks and side effects    Treatment Plan:  Frequency:  1 X week, once daily  Duration:  for 6 weeks    Treatment Plan:  Therapeutic Exercise: AROM, Isometrics, and Stabilization exercises of the Thumb CMC, including  active and resisted abduction, 1st DI strengthening.  Manual Techniques: Joint Mobilization or reseating of the trapezium  Orthosis:  Hand based Thumb Spica, CMC support (allowing more thumb mobility)  Education: anatomy of CMC, joint protection principles, adaptive equipment as needed.    Home Program:  Exercise: Place and hold of the Stabilization Program 1-2 times a day  Orthosis: Hand based Thumb Spica initially during the day progressing to at night, CMC functional splint during the day  Activity: incorporate joint protection into daily functional activities, adaptive equipment as needed.    Discharge Plan:  Achieve all LTG  [place] in home treatment program.  Reach maximal therapeutic benefit.  Please refer to the daily flowsheet for treatment today and total treatment time.      Next Visit:  MFR  Progress as tolerated  Fit for splints bilateral

## 2018-03-29 NOTE — LETTER
DEPARTMENT OF HEALTH AND HUMAN SERVICES  CENTERS FOR MEDICARE & MEDICAID SERVICES    PLAN/UPDATED PLAN OF PROGRESS FOR OUTPATIENT REHABILITATION    PATIENTS NAME:  Parveen Flor   : 1960  PROVIDER NUMBER:  [id number]  [id number]  PROVIDER NAME: [facility]  MEDICAL RECORD NUMBER: [id number]   START OF CARE DATE:    SOC Date: 18   TYPE:  OT  PRIMARY/TREATMENT DIAGNOSIS: (Pertinent Medical Diagnosis)  Bilateral thumb pain  Primary osteoarthritis of first carpometacarpal joint of left hand  Primary osteoarthritis of first carpometacarpal joint of right hand    VISITS FROM START OF CARE:  Rxs Used: 1     Hand Therapy Initial Evaluation  Current Date:  3/29/2018    Subjective:  Parveen Flor is a 57 year old right hand dominant male.    Diagnosis: Bilateral thumb OA  DOI:  3/1/18 MD order date    Patient reports symptoms of pain, stiffness/loss of motion, weakness/loss of strength, edema, numbness and tingling  of Bilateral hands which occurred due to unknown cause. Since onset symptoms are Gradually getting worse. Special tests:  x-ray.  Previous treatment: None. General health as reported by patient is good.  Pertinent medical history includes: osteoarthritis, pain at rest/night.  Medical allergies: none.  Surgical history: none.  Medication history: Ridlin.    Occupational Profile Information:  Current occupation is Unemployed  Currently not working due to present treatment problem  Prior functional level:  no limitations  Barriers include:none  Mobility: No difficulty  Transportation: drives  Leisure activities/hobbies: Foosball, drumming, drawing  Upper Extremity Functional Index Score:    Upper Extremity Functional Index Score:  SCORE:   Column Totals: /80: 35   (A lower score indicates greater disability.)    Objective:  Pain Level Report: On scale 0-10/10  Date 3/29/2018    side R L   Overall 8 7.5   At Rest 7 6.5   With Activity 9 8.5       PATIENTS NAME:  Chacho  Parveen   : 1960    Report of Pain:  Location: Bilateral thumbs  Pain Quality:  Stabbing  Frequency: constant    Pain is worst:  daytime  Exacerbated by:  Pinching, hand use, buttoning, zipping  Relieved by:  rest  Progression:  Gradually getting worse  Tenderness:  Date 3/29/2018   R CMC of the Thumb +   L CMC of the Thumb +     Appearance:  Date 3/29/2018 3/29/18   Side R L   Shoulder deformity is present over the CMC + +   Volar subluxation present - -   Edema over the CMC joint - -   Noted collapse of MP into hyperextension during pinch - -     Thumb Range of Motion:  AROM (PROM)  Date 3/29/2018 3/29/2018   Side: R L   MCP ext -35 -20   MCP flex 70 62   IP ext 0 0   IP flex 56 55   RABD 45 50   PABD 45 46     Special Tests:  Date 3/29/2018 3/29/18   Side R L   Grind + +   Passive Retroposition + +     Strength:   (Measured in pounds)  Pain Report:  - none    + mild    ++ moderate    +++ severe     3/29/2018   Trials R L   1  2  3 51 43   Average:               PATIENTS NAME:  Parveen Flor   : 1960      Lat Pinch  3/29/2018   Trials R L   1  2  3 3 4   Average:       3 Pt Pinch  3/29/2018   Trials R L   1  2  3 4 6   Average:       Assessment:  Patient presents with symptoms consistent with diagnosis of bilateral CMC thumb arthritis, with conservative intervention.    Patient s limitations or Problem List includes: Pain, Decreased ROM/motion, weakness, decreased stability of the CMC joint, decreased  and pinch strength of the thumb which interferes with patients ability to perform  Self Care Tasks (dressing), Work Tasks, Sleep Patterns, Recreational Activities, Household Chores and Driving   as compared to previous level of function.    Rehab Potential: Good- Return to full activity, some limitations.    Patient will benefit from skilled Occupational Therapy to increase ROM, flexibility, pinch strength, and stability of the thumb and decrease pain to return to previous activity  level and resume normal daily tasks and to reach their rehab potential.    Barriers to Learning:  No barrier    Communication Issues: Patient appears to be able to clearly communicate and understand verbal and written communication and follow directions correctly.    Chart Review: Chart Review and Brief history including review of medical and/or therapy records relating to the presenting problem    Identified Performance Deficits: dressing, feeding, driving and community mobility, health management and maintenance, home establishment and management, meal preparation and cleanup, sleep, work and leisure activities    Assessment of Occupational Performance:  5 or more Performance Deficits     Clinical Decision Making (Complexity): Low complexity    Treatment Explanations:  The following has been discussed with the patient,  Rx ordered/plan of care  Anticipated outcomes  Possible risks and side effects    Treatment Plan:  Frequency:  1 X week, once daily  Duration:  for 6 weeks  PATIENTS NAME:  Parveen Flor   : 1960    Treatment Plan:  Therapeutic Exercise: AROM, Isometrics, and Stabilization exercises of the Thumb CMC, including  active and resisted abduction, 1st DI strengthening.  Manual Techniques: Joint Mobilization or reseating of the trapezium  Orthosis:  Hand based Thumb Spica, CMC support (allowing more thumb mobility)  Education: anatomy of CMC, joint protection principles, adaptive equipment as needed.    Home Program:  Exercise: Place and hold of the Stabilization Program 1-2 times a day  Orthosis: Hand based Thumb Spica initially during the day progressing to at night, CMC functional splint during the day  Activity: incorporate joint protection into daily functional activities, adaptive equipment as needed.    Discharge Plan:  Achieve all LTG  Heaters in home treatment program.  Reach maximal therapeutic benefit.  Please refer to the daily flowsheet for treatment today and total  "treatment time.      Next Visit:  MFR  Progress as tolerated  Fit for splints bilateral          Caregiver Signature/Credentials ______________________________ Date ________      Treating Provider: Eileen PAULINO CHT    I have reviewed and certified the need for these services and plan of treatment while under my care.        PHYSICIAN'S SIGNATURE:   ____________________________________ Date___________      Leon Moss MD    Certification period: Beginning of Cert date period: 03/29/18 End of Cert period date: 06/26/18     Functional Level Progress Report: Please see attached \"Goal Flow sheet for Functional level.\"    ___X_____ Continue Services or       ________ DC Services                Service dates: SOC Date: 03/29/18  to present                                                                     "

## 2018-04-02 ENCOUNTER — OFFICE VISIT (OUTPATIENT)
Dept: FAMILY MEDICINE | Facility: CLINIC | Age: 58
End: 2018-04-02
Payer: COMMERCIAL

## 2018-04-02 ENCOUNTER — TELEPHONE (OUTPATIENT)
Dept: FAMILY MEDICINE | Facility: CLINIC | Age: 58
End: 2018-04-02

## 2018-04-02 VITALS
HEART RATE: 68 BPM | RESPIRATION RATE: 16 BRPM | WEIGHT: 168 LBS | TEMPERATURE: 97.9 F | SYSTOLIC BLOOD PRESSURE: 130 MMHG | HEIGHT: 71 IN | DIASTOLIC BLOOD PRESSURE: 76 MMHG | BODY MASS INDEX: 23.52 KG/M2

## 2018-04-02 DIAGNOSIS — F90.0 ATTENTION DEFICIT HYPERACTIVITY DISORDER (ADHD), PREDOMINANTLY INATTENTIVE TYPE: Primary | ICD-10-CM

## 2018-04-02 DIAGNOSIS — K13.21 LEUKOPLAKIA OF TONGUE: ICD-10-CM

## 2018-04-02 DIAGNOSIS — L72.3 SEBACEOUS CYST: ICD-10-CM

## 2018-04-02 DIAGNOSIS — R20.1 HYPOESTHESIA OF SKIN: Primary | ICD-10-CM

## 2018-04-02 DIAGNOSIS — M18.0 OSTEOARTHRITIS OF THUMBS, BILATERAL: ICD-10-CM

## 2018-04-02 PROCEDURE — 86780 TREPONEMA PALLIDUM: CPT | Performed by: FAMILY MEDICINE

## 2018-04-02 PROCEDURE — 82746 ASSAY OF FOLIC ACID SERUM: CPT | Performed by: FAMILY MEDICINE

## 2018-04-02 PROCEDURE — 82607 VITAMIN B-12: CPT | Performed by: FAMILY MEDICINE

## 2018-04-02 PROCEDURE — 99214 OFFICE O/P EST MOD 30 MIN: CPT | Performed by: FAMILY MEDICINE

## 2018-04-02 PROCEDURE — 84443 ASSAY THYROID STIM HORMONE: CPT | Performed by: FAMILY MEDICINE

## 2018-04-02 PROCEDURE — 36415 COLL VENOUS BLD VENIPUNCTURE: CPT | Performed by: FAMILY MEDICINE

## 2018-04-02 RX ORDER — METHYLPHENIDATE HYDROCHLORIDE 10 MG/1
TABLET ORAL
Qty: 150 TABLET | Refills: 0
Start: 2018-04-30 | End: 2018-05-01

## 2018-04-02 ASSESSMENT — ENCOUNTER SYMPTOMS: NUMBNESS: 1

## 2018-04-02 NOTE — MR AVS SNAPSHOT
After Visit Summary   4/2/2018    Parveen Flro    MRN: [id number]           Patient Information     Date Of Birth          1960        Visit Information        Provider Department      4/2/2018 4:00 PM Leon Moss MD [facility]        Today's Diagnoses     Hypoesthesia of skin    -  1    Osteoarthritis of thumbs, bilateral        Leukoplakia of tongue        Sebaceous cyst           Follow-ups after your visit        Additional Services     NEUROLOGY ADULT REFERRAL       Your provider has referred you for the following:   EMG at [facility]: [facility] of Neurology [facility] [phone]   [email or web address]    Please be aware that coverage of these services is subject to the terms and limitations of your health insurance plan.  Call member services at your health plan with any benefit or coverage questions.      Please bring the following with you to your appointment:    (1) Any X-Rays, CTs or MRIs which have been performed.  Contact the facility where they were done to arrange for  prior to your scheduled appointment.    (2) List of current medications  (3) This referral request   (4) Any documents/labs given to you for this referral    EMG bilat lower extremities                  Your next 10 appointments already scheduled     Apr 05, 2018  8:20 AM CDT   RICHARD Extremity with KENNY Isbell PT ([facility] [place]  )    [address] [place] [address]   [phone]            Apr 05, 2018  9:00 AM CDT   [facility]ARD Hand with Eileen Benitez OT   [facility] ([facility] [place]  )    [id number] [place] [address]   [phone]            Apr 12, 2018  1:50 PM CDT   RICHARD Extremity with KENNY IsbellM RS [facility] PT ([facility] [place]  )    [address] [place] [address]   [phone]            Apr 12, 2018  2:30 PM CDT   RICHARD Hand with  "Eileen Benitez, OT   [facility] [place] [facility] ([facility] [place]  )    [address]  [address]  [facility] [address]   [phone]            Apr 19, 2018  9:00 AM CDT   RICHARD Extremity with Isabelle Wang, PTA   [facility] [place] [facility] ([facility] [place]  )    [address]  [facility] [address]   [phone]            Apr 26, 2018  9:00 AM CDT   RICHARD Extremity with Isabelle Wang, PTA   [facility] [place] [facility] ([facility] [place]  )    [address]  [address]  [facility] [address]   [phone]            May 02, 2018  8:50 AM CDT   RICHARD Extremity with Jose William, PT   [facility] [place] [facility] ([facility] [place]  )    [address]  [facility] [address]   [phone]              Who to contact     If you have questions or need follow up information about today's clinic visit or your schedule please contact [facility] directly at [phone].  Normal or non-critical lab and imaging results will be communicated to you by MedDayhart, letter or phone within 4 business days after the clinic has received the results. If you do not hear from us within 7 days, please contact the clinic through [employer] or phone. If you have a critical or abnormal lab result, we will notify you by phone as soon as possible.  Submit refill requests through [employer] or call your pharmacy and they will forward the refill request to us. Please allow 3 business days for your refill to be completed.          Additional Information About Your Visit        [employer] Information     [employer] lets you send messages to your doctor, view your test results, renew your prescriptions, schedule appointments and more. To sign up, go to [email or web address] . Click on \"Log in\" on the left side of the screen, which will take you to the Welcome page. Then click on \"Sign up Now\" on the right side of the page.     You will be asked to enter the access code listed below, as well as some personal information. Please follow " "the directions to create your username and password.     Your access code is: [email or web address]  Expires: 2018  6:24 PM     Your access code will  in 90 days. If you need help or a new code, please call your [facility] clinic or [phone].        Care EveryWhere ID     This is your Care EveryWhere ID. This could be used by other organizations to access your [facility] medical records  [id number]        Your Vitals Were     Pulse Temperature Respirations Height BMI (Body Mass Index)       68 97.9  F (36.6  C) (Oral) 16 5' 11\" (1.803 m) 23.43 kg/m2        Blood Pressure from Last 3 Encounters:   18 130/76   18 152/88   18 130/75    Weight from Last 3 Encounters:   18 168 lb (76.2 kg)   18 164 lb (74.4 kg)   18 164 lb (74.4 kg)              We Performed the Following     Anti Treponema     Folate     NEUROLOGY ADULT REFERRAL     TSH with free T4 reflex     Vitamin B12          Where to get your medicines      Some of these will need a paper prescription and others can be bought over the counter.  Ask your nurse if you have questions.     You don't need a prescription for these medications     methylphenidate 10 MG tablet          Primary Care Provider Office Phone # Fax #    Leon Moss [phone] [address] [address] [facility] [address]        Equal Access to Services     YUNG CHEATHAM AH: Hadii nancy landino Sowenceslao, waaxda luqadaha, qaybta kaalmada edelmira, waxrachid julieta hewitt. So [facility] [phone].    ATENCIÓN: Si habla español, tiene a rogers disposición servicios gratuitos de asistencia lingüística. Llidalmis al [phone].    We comply with applicable federal civil rights laws and [place] laws. We do not discriminate on the basis of race, color, national origin, age, disability, sex, sexual orientation, or gender identity.            Thank you!     Thank you for choosing [facility]  for your care. Our goal is " always to provide you with excellent care. Hearing back from our patients is one way we can continue to improve our services. Please take a few minutes to complete the written survey that you may receive in the mail after your visit with us. Thank you!             Your Updated Medication List - Protect others around you: Learn how to safely use, store and throw away your medicines at www.disposemymeds.org.          This list is accurate as of 4/2/18  6:30 PM.  Always use your most recent med list.                   Brand Name Dispense Instructions for use Diagnosis    diclofenac 1 % Gel topical gel    VOLTAREN    100 g    Place onto the skin 4 times daily    Osteoarthritis of thumbs, bilateral       IBUPROFEN PO           methylphenidate 10 MG tablet   Start taking on:  4/30/2018    RITALIN    150 tablet    2 am, 2 mid day, 1 at hs    Attention deficit hyperactivity disorder (ADHD), predominantly inattentive type

## 2018-04-02 NOTE — NURSING NOTE
"Chief Complaint   Patient presents with     Numbness     rt thigh as numbness and left toes and elbow also x 6 months        Initial /76 (BP Location: Right arm, Patient Position: Chair, Cuff Size: Adult Regular)  Pulse 68  Temp 97.9  F (36.6  C) (Oral)  Resp 16  Ht 5' 11\" (1.803 m)  Wt 168 lb (76.2 kg)  BMI 23.43 kg/m2 Estimated body mass index is 23.43 kg/(m^2) as calculated from the following:    Height as of this encounter: 5' 11\" (1.803 m).    Weight as of this encounter: 168 lb (76.2 kg).  Medication Reconciliation: complete rt arm Mago Monroy MA      "

## 2018-04-02 NOTE — LETTER
April 4, 2018      Parveen DUNNE Chacho  [address] [place] [address]  [place] [address]        Dear ,    Tests are all normal. Good news, no help    Resulted Orders   Vitamin B12   Result Value Ref Range    Vitamin B12 555 193 - 986 pg/mL   Folate   Result Value Ref Range    Folate 14.6 >5.4 ng/mL   TSH with free T4 reflex   Result Value Ref Range    TSH 2.42 0.40 - 4.00 mU/L   Anti Treponema   Result Value Ref Range    Treponema pallidum Antibody Negative NEG^Negative       If you have any questions or concerns, please call the clinic at the number listed above.       Sincerely,        Leon Moss MD

## 2018-04-03 LAB
FOLATE SERPL-MCNC: 14.6 NG/ML
T PALLIDUM IGG+IGM SER QL: NEGATIVE
TSH SERPL DL<=0.005 MIU/L-ACNC: 2.42 MU/L (ref 0.4–4)
VIT B12 SERPL-MCNC: 555 PG/ML (ref 193–986)

## 2018-04-06 ENCOUNTER — TELEPHONE (OUTPATIENT)
Dept: FAMILY MEDICINE | Facility: CLINIC | Age: 58
End: 2018-04-06

## 2018-04-06 NOTE — TELEPHONE ENCOUNTER
Received 6 page fax for Plan/Updated Plan of Progress for Outpatient Rehabilitation for Dr Moss to complete. Form in the in-basket at 's desk.

## 2018-04-12 ENCOUNTER — THERAPY VISIT (OUTPATIENT)
Dept: PHYSICAL THERAPY | Facility: CLINIC | Age: 58
End: 2018-04-12
Payer: COMMERCIAL

## 2018-04-12 ENCOUNTER — PATIENT OUTREACH (OUTPATIENT)
Dept: CARE COORDINATION | Facility: CLINIC | Age: 58
End: 2018-04-12

## 2018-04-12 ENCOUNTER — THERAPY VISIT (OUTPATIENT)
Dept: OCCUPATIONAL THERAPY | Facility: CLINIC | Age: 58
End: 2018-04-12
Payer: COMMERCIAL

## 2018-04-12 DIAGNOSIS — M18.11 PRIMARY OSTEOARTHRITIS OF FIRST CARPOMETACARPAL JOINT OF RIGHT HAND: ICD-10-CM

## 2018-04-12 DIAGNOSIS — M25.511 BILATERAL SHOULDER PAIN, UNSPECIFIED CHRONICITY: ICD-10-CM

## 2018-04-12 DIAGNOSIS — M79.645 BILATERAL THUMB PAIN: ICD-10-CM

## 2018-04-12 DIAGNOSIS — M79.644 BILATERAL THUMB PAIN: ICD-10-CM

## 2018-04-12 DIAGNOSIS — M18.12 PRIMARY OSTEOARTHRITIS OF FIRST CARPOMETACARPAL JOINT OF LEFT HAND: ICD-10-CM

## 2018-04-12 DIAGNOSIS — M25.512 BILATERAL SHOULDER PAIN, UNSPECIFIED CHRONICITY: ICD-10-CM

## 2018-04-12 PROCEDURE — 97110 THERAPEUTIC EXERCISES: CPT | Mod: GP | Performed by: PHYSICAL THERAPY ASSISTANT

## 2018-04-12 PROCEDURE — 97760 ORTHOTIC MGMT&TRAING 1ST ENC: CPT | Mod: GO | Performed by: OCCUPATIONAL THERAPIST

## 2018-04-12 NOTE — PROGRESS NOTES
Clinic Care Coordination Contact  [facility]/Voicemail    Clinical Data: received a voicemail from the patient. States he has questions about the materials this CCSW gave him a month ago.    Outreach attempted x 1.  Left message on voicemail with call back information and requested return call.  Plan: Care coordinator will try again in 2-3 weeks if the pt does not return the call.    Eileen Goins, Social Work Care Coordinator, [facility], MSW  [facility]: [place], [place], and [place]   P:[phone] / Signed April 12, 2018

## 2018-04-12 NOTE — LETTER
April 27, 2018      Parveen Flor  [address]  [place] [address]        Dear Parveen,    I am a clinic care coordinator who works with your primary physician, Leon Moss, at the [facility]. I have been trying to reach you to follow up on the resources we discussed here in the clinic, but I have not been able to contact you. I wanted to provide you with my contact information so that you can call me with questions or concerns about your health care. Below is a description of clinic care coordination and how I can further assist you.     The clinic care coordinator is a registered nurse and/or  who understand the health care system. The goal of clinic care coordination is to help you manage your health and improve access to the [facility] system in the most efficient manner. The registered nurse can assist you in meeting your health care goals by providing education, coordinating services, and strengthening the communication among your providers. The  can assist you with financial, behavioral, psychosocial, chemical dependency, counseling, and/or psychiatric resources.    Please feel free to contact me at [phone], with any questions or concerns. We at [facility] are focused on providing you with the highest-quality healthcare experience possible and that all starts with you.     Sincerely,    CAIN Servin, MSW  Social Work Care Coordinator  P:[phone]  [facility] [place] [place]

## 2018-04-12 NOTE — LETTER
April 27, 2018      Parveen Flor  [address]  [place] [address]        Dear Parveen,    I am a clinic care coordinator who works with your primary physician, Leon Moss, at the [facility]. I wanted to thank you for spending the time to talk with me.  I wanted to provide you with my contact information so that you can call me with questions or concerns about your health care. Below is a description of clinic care coordination and how I can further assist you.     The clinic care coordinator is a registered nurse and/or  who understand the health care system. The goal of clinic care coordination is to help you manage your health and improve access to the [facility] in the most efficient manner. The registered nurse can assist you in meeting your health care goals by providing education, coordinating services, and strengthening the communication among your providers. The  can assist you with financial, behavioral, psychosocial, chemical dependency, counseling, and/or psychiatric resources.    Please feel free to contact me at [phone], with any questions or concerns. We at [facility] are focused on providing you with the highest-quality healthcare experience possible and that all starts with you.     Sincerely,    CAIN Servin, MSW  Social Work Care Coordinator  P:[phone]  [facility] [place] [place]          Job Search   [facility] Workforce centers     [phone]  [place]      [phone]  [facility]      [phone]  [facility]  [address]  [place] [address]    [place] [facility] [facility]  [address]  [place] [address]    [place] [facility] [facility]  [address] [place] [address]   Diversionary Work Program  Through [facility]. For families and children. 4 month program to help parents find jobs    [facility] [phone]  For orientation registration Help on Mondays at 9:30 am.  1 hour orientation to programs offered for job seekers  http://www.emergemn.org/OurServices/HelpforJobSeekers.aspx   [employer] [phone] HIRED offers dozens of no-cost programs for job seekers in any stage of work experience or career development. Job seekers could qualify for a program based on geography, income level, age or other circumstance. Other job seekers are referred to [employer] by other agencies. No matter what the entry point, HIRED counselors commit to wrap-around job support to move the job seeker to employment   Myrtle Mcgraw  [phone] ([place]) to their Career Connections 9-11:30am First Saturday of the month   [place] jobs for [place] with [place]  https://[place]deed[place]job-seekers/disabilities/     [place] [employer] Services  https://mn.[facility].org/[place]/programs/job_planning/work_support/bsqeszh2j.htm  Under the Ticket to Work Program, adults aged 18 - 64 who get SSI or SSDI due to a disability are automatically eligible for Vocational Rehabilitation services.

## 2018-04-26 ENCOUNTER — THERAPY VISIT (OUTPATIENT)
Dept: OCCUPATIONAL THERAPY | Facility: CLINIC | Age: 58
End: 2018-04-26
Payer: COMMERCIAL

## 2018-04-26 ENCOUNTER — THERAPY VISIT (OUTPATIENT)
Dept: PHYSICAL THERAPY | Facility: CLINIC | Age: 58
End: 2018-04-26
Payer: COMMERCIAL

## 2018-04-26 DIAGNOSIS — M25.512 BILATERAL SHOULDER PAIN, UNSPECIFIED CHRONICITY: ICD-10-CM

## 2018-04-26 DIAGNOSIS — M18.11 PRIMARY OSTEOARTHRITIS OF FIRST CARPOMETACARPAL JOINT OF RIGHT HAND: ICD-10-CM

## 2018-04-26 DIAGNOSIS — M79.644 BILATERAL THUMB PAIN: ICD-10-CM

## 2018-04-26 DIAGNOSIS — M79.645 BILATERAL THUMB PAIN: ICD-10-CM

## 2018-04-26 DIAGNOSIS — M25.511 BILATERAL SHOULDER PAIN, UNSPECIFIED CHRONICITY: ICD-10-CM

## 2018-04-26 DIAGNOSIS — M18.12 PRIMARY OSTEOARTHRITIS OF FIRST CARPOMETACARPAL JOINT OF LEFT HAND: ICD-10-CM

## 2018-04-26 PROCEDURE — 97110 THERAPEUTIC EXERCISES: CPT | Mod: GP | Performed by: PHYSICAL THERAPY ASSISTANT

## 2018-04-26 PROCEDURE — 97110 THERAPEUTIC EXERCISES: CPT | Mod: GO | Performed by: OCCUPATIONAL THERAPIST

## 2018-04-26 PROCEDURE — 97140 MANUAL THERAPY 1/> REGIONS: CPT | Mod: GO | Performed by: OCCUPATIONAL THERAPIST

## 2018-04-27 NOTE — PROGRESS NOTES
"Clinic Care Coordination Contact    Incoming call from the patient.    Discussed SSDI. States he spoke to a  and learned the process could take a couple years and by then he could opt for early half-way. States he will try to apply but is also feeling he may try work again too and \"tough it out\" a few more years stating \" I have a high tolerance for pain\".  CCSW will send him job searching resources.    Patient reports using his [employer] transportation. States it has worked well for him.    PLAN:  Pt will outreach as needed  CCSW will follow up in 1 month    Eileen Goins, Social Work Care Coordinator, LGSW, MSW  [facility]: [place], [place], and [place]   P:[phone]/ Signed April 27, 2018   "

## 2018-04-27 NOTE — PROGRESS NOTES
Clinic Care Coordination Contact  [facility]/Voicemail    Clinical Data: see below    Outreach attempted x 2.  Left message on voicemail with call back information and requested return call.  Plan: Care coordinator will send a UITC letter and review in 1 month.    Eileen Goins, Social Work Care Coordinator, [facility], [facility]: [place], [place], and [place]   P:[phone] / Signed April 27, 2018

## 2018-05-01 ENCOUNTER — THERAPY VISIT (OUTPATIENT)
Dept: OCCUPATIONAL THERAPY | Facility: CLINIC | Age: 58
End: 2018-05-01
Payer: COMMERCIAL

## 2018-05-01 ENCOUNTER — OFFICE VISIT (OUTPATIENT)
Dept: FAMILY MEDICINE | Facility: CLINIC | Age: 58
End: 2018-05-01
Payer: COMMERCIAL

## 2018-05-01 VITALS
DIASTOLIC BLOOD PRESSURE: 83 MMHG | TEMPERATURE: 98.1 F | HEIGHT: 71 IN | WEIGHT: 158 LBS | HEART RATE: 71 BPM | RESPIRATION RATE: 16 BRPM | SYSTOLIC BLOOD PRESSURE: 135 MMHG | BODY MASS INDEX: 22.12 KG/M2

## 2018-05-01 DIAGNOSIS — M79.644 BILATERAL THUMB PAIN: ICD-10-CM

## 2018-05-01 DIAGNOSIS — Z00.00 ROUTINE GENERAL MEDICAL EXAMINATION AT A HEALTH CARE FACILITY: ICD-10-CM

## 2018-05-01 DIAGNOSIS — Z13.6 CARDIOVASCULAR SCREENING; LDL GOAL LESS THAN 160: ICD-10-CM

## 2018-05-01 DIAGNOSIS — M79.645 BILATERAL THUMB PAIN: ICD-10-CM

## 2018-05-01 DIAGNOSIS — M18.11 PRIMARY OSTEOARTHRITIS OF FIRST CARPOMETACARPAL JOINT OF RIGHT HAND: ICD-10-CM

## 2018-05-01 DIAGNOSIS — F90.0 ATTENTION DEFICIT HYPERACTIVITY DISORDER (ADHD), PREDOMINANTLY INATTENTIVE TYPE: Primary | ICD-10-CM

## 2018-05-01 DIAGNOSIS — J35.8 MUCOUS CYST OF TONSIL: ICD-10-CM

## 2018-05-01 DIAGNOSIS — M18.12 PRIMARY OSTEOARTHRITIS OF FIRST CARPOMETACARPAL JOINT OF LEFT HAND: ICD-10-CM

## 2018-05-01 PROBLEM — K13.21 LEUKOPLAKIA OF TONGUE: Status: RESOLVED | Noted: 2018-03-01 | Resolved: 2018-05-01

## 2018-05-01 PROCEDURE — 87389 HIV-1 AG W/HIV-1&-2 AB AG IA: CPT | Performed by: FAMILY MEDICINE

## 2018-05-01 PROCEDURE — 86803 HEPATITIS C AB TEST: CPT | Performed by: FAMILY MEDICINE

## 2018-05-01 PROCEDURE — 99213 OFFICE O/P EST LOW 20 MIN: CPT | Performed by: FAMILY MEDICINE

## 2018-05-01 PROCEDURE — 80061 LIPID PANEL: CPT | Performed by: FAMILY MEDICINE

## 2018-05-01 PROCEDURE — 97110 THERAPEUTIC EXERCISES: CPT | Mod: GO | Performed by: OCCUPATIONAL THERAPIST

## 2018-05-01 PROCEDURE — 36415 COLL VENOUS BLD VENIPUNCTURE: CPT | Performed by: FAMILY MEDICINE

## 2018-05-01 PROCEDURE — 80053 COMPREHEN METABOLIC PANEL: CPT | Performed by: FAMILY MEDICINE

## 2018-05-01 PROCEDURE — 97140 MANUAL THERAPY 1/> REGIONS: CPT | Mod: GO | Performed by: OCCUPATIONAL THERAPIST

## 2018-05-01 RX ORDER — METHYLPHENIDATE HYDROCHLORIDE 10 MG/1
TABLET ORAL
Qty: 150 TABLET | Refills: 0 | Status: SHIPPED | OUTPATIENT
Start: 2018-05-31 | End: 2018-05-01

## 2018-05-01 RX ORDER — METHYLPHENIDATE HYDROCHLORIDE 10 MG/1
TABLET ORAL
Qty: 150 TABLET | Refills: 0 | Status: SHIPPED | OUTPATIENT
Start: 2018-05-01 | End: 2018-05-01

## 2018-05-01 RX ORDER — METHYLPHENIDATE HYDROCHLORIDE 10 MG/1
TABLET ORAL
Qty: 150 TABLET | Refills: 0 | Status: SHIPPED | OUTPATIENT
Start: 2018-06-30 | End: 2018-05-29

## 2018-05-01 NOTE — NURSING NOTE
"Chief Complaint   Patient presents with     Recheck Medication     Numbness     Both legs x 6 months wondering about MRI of back        Initial Ht 5' 11\" (1.803 m) Estimated body mass index is 23.43 kg/(m^2) as calculated from the following:    Height as of 4/2/18: 5' 11\" (1.803 m).    Weight as of 4/2/18: 168 lb (76.2 kg).  Medication Reconciliation: complete rt arm Mago LUGO      "

## 2018-05-01 NOTE — PROGRESS NOTES
SUBJECTIVE:   Parveen Flor is a 57 year old male who presents to clinic today for the following health issues:      Medication Followup of ritalin     Taking Medication as prescribed: yes    Side Effects:  None    Medication Helping Symptoms:  yes       Attention deficit hyperactivity disorder (ADHD), predominantly inattentive type  (primary encounter diagnosis): Presents for refill of ritalin    Mucous cyst of tonsil: ENT visit was reassuring      CARDIOVASCULAR SCREENING; LDL GOAL LESS THAN 160  LDL Cholesterol Calculated   Date Value Ref Range Status   05/23/2012 125 0 - 129 mg/dL Final     Comment:     LDL Cholesterol is the primary guide to therapy: LDL-cholesterol goal in high   risk patients is <100 mg/dL and in very high risk patients is <70 mg/dL.   ]    Dupuytren's contracture- patient has been wearing splints, feels a little better, saw hand therapy.     Problem list and histories reviewed & adjusted, as indicated.  Additional history: none        Reviewed and updated as needed this visit by clinical staff  Tobacco  Allergies  Meds  Med Hx  Surg Hx  Fam Hx  Soc Hx       Past Medical History:   Diagnosis Date     Attention deficit disorder, unspecified hyperactivity presence 3/1/2018     Bilateral low back pain without sciatica, unspecified chronicity 3/1/2018     Chemical dependency (H)     Sober x 7 years.     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 No pertinent surgical history.    Family History   Problem Relation Age of Onset     HEART DISEASE Paternal Grandmother      Arthritis Paternal Grandmother      Alcohol/Drug Maternal Grandfather      CANCER Maternal Grandmother      Liver       Social History   Substance  "Use Topics     Smoking status: Former Smoker     Packs/day: 0.50     Years: 4.00     Types: Cigarettes     Quit date: 11/2017     Smokeless tobacco: Never Used     Alcohol use No       Reviewed and updated as needed this visit by Provider         ROS:  Legs numb bilaterally x6 months, feels well overall    This document serves as a record of the services and decisions personally performed and made by Leon Moss MD. It was created on his behalf by Princess Haddad, a trained medical scribe.  The creation of this document is based on the scribe's personal observations and the provider's statements to the medical scribe.  Princess Haddad, May 1, 2018 3:01 PM    OBJECTIVE:     /83 (BP Location: Right arm, Patient Position: Chair, Cuff Size: Adult Regular)  Pulse 71  Temp 98.1  F (36.7  C) (Oral)  Resp 16  Ht 1.803 m (5' 11\")  Wt 71.7 kg (158 lb)  BMI 22.04 kg/m2  Body mass index is 22.04 kg/(m^2).    Fingers extend to neutral      ASSESSMENT/PLAN:   ASSESSMENT / PLAN:  (F90.0) Attention deficit hyperactivity disorder (ADHD), predominantly inattentive type  (primary encounter diagnosis)  Comment: refill  Plan: methylphenidate (RITALIN) 10 MG tablet,         DISCONTINUED: methylphenidate (RITALIN) 10 MG         tablet, DISCONTINUED: methylphenidate (RITALIN)        10 MG tablet            (J35.8) Mucous cyst of tonsil  Comment: **Not leukoplakia.  Discussed  Plan:     (Z00.00) Routine general medical examination at a health care facility  Comment: health maintenance discussed   Plan: HIV Antigen Antibody Combo,  Hepatitis C Screen Reflex to         HCV RNA Quant and Genotype            (Z13.6) CARDIOVASCULAR SCREENING; LDL GOAL LESS THAN 160  Comment: Lipids cmp  Plan:       RTC depending on lab       The information in this document, created by the medical scribe for me, accurately reflects the services I personally performed and the decisions made by me. I have reviewed and approved this document for accuracy " prior to leaving the patient care area.  Leon Moss MD May 1, 2018 3:01 PM      Leon Moss MD  [facility]

## 2018-05-01 NOTE — MR AVS SNAPSHOT
After Visit Summary   5/1/2018    Parveen Flor    MRN: [id number]           Patient Information     Date Of Birth          1960        Visit Information        Provider Department      5/1/2018 3:15 PM Leon Moss MD [facility]        Today's Diagnoses     Attention deficit hyperactivity disorder (ADHD), predominantly inattentive type    -  1    Mucous cyst of tonsil        Routine general medical examination at a health care facility        CARDIOVASCULAR SCREENING; LDL GOAL LESS THAN 160           Follow-ups after your visit        Follow-up notes from your care team     Return in about 3 months (around 8/1/2018).      Your next 10 appointments already scheduled     May 04, 2018  7:40 AM CDT   [facility] Extremity with Isabelle Wang PTA   [facility] ([facility]  )    [address] [facility]  [address] [place] [address]   [phone]            May 08, 2018 12:30 PM CDT   [facility] Hand with Eileen Benitez OT   [facility] ([facility]  )    [address] [facility]  [address] [place] [address]   [phone]              Who to contact     If you have questions or need follow up information about today's clinic visit or your schedule please contact [facility] directly at [phone].  Normal or non-critical lab and imaging results will be communicated to you by 20:20 Mobilehart, letter or phone within 4 business days after the clinic has received the results. If you do not hear from us within 7 days, please contact the clinic through MyChart or phone. If you have a critical or abnormal lab result, we will notify you by phone as soon as possible.  Submit refill requests through [employer] or call your pharmacy and they will forward the refill request to us. Please allow 3 business days for your refill to be completed.          Additional Information About Your Visit        20:20 [email or web address] Information     [employer]uelFilm lets you send messages to your  "doctor, view your test results, renew your prescriptions, schedule appointments and more. To sign up, go to [email or web address] . Click on \"Log in\" on the left side of the screen, which will take you to the Welcome page. Then click on \"Sign up Now\" on the right side of the page.     You will be asked to enter the access code listed below, as well as some personal information. Please follow the directions to create your username and password.     Your access code is: [email or web address]  Expires: 2018  6:24 PM     Your access code will  in 90 days. If you need help or a new code, please call your [place] clinic or [phone].        Care EveryWhere ID     This is your Care EveryWhere ID. This could be used by other organizations to access your [place] medical records  [id number]        Your Vitals Were     Pulse Temperature Respirations Height BMI (Body Mass Index)       71 98.1  F (36.7  C) (Oral) 16 5' 11\" (1.803 m) 22.04 kg/m2        Blood Pressure from Last 3 Encounters:   18 135/83   18 130/76   18 152/88    Weight from Last 3 Encounters:   18 158 lb (71.7 kg)   18 168 lb (76.2 kg)   18 164 lb (74.4 kg)              We Performed the Following     Comprehensive metabolic panel     Hepatitis C Screen Reflex to HCV RNA Quant and Genotype     HIV Antigen Antibody Combo     Lipid panel reflex to direct LDL Non-fasting          Today's Medication Changes          These changes are accurate as of 18 11:59 PM.  If you have any questions, ask your nurse or doctor.               Start taking these medicines.        Dose/Directions    methylphenidate 10 MG tablet   Commonly known as:  RITALIN   Used for:  Attention deficit hyperactivity disorder (ADHD), predominantly inattentive type   Started by:  Leon Moss MD        Start taking on:  2018   2 am, 2 mid day, 1 at hs   Quantity:  150 tablet   Refills:  0            Where to get your medicines      Some of " these will need a paper prescription and others can be bought over the counter.  Ask your nurse if you have questions.     Bring a paper prescription for each of these medications     methylphenidate 10 MG tablet                Primary Care Provider Office Phone # Fax #    Leon Moss [phone] [address] [facility] [address]        Equal Access to Services     [facility]: Hadii aad ku hadasho Soomaali, waaxda luqadaha, qaybta kaalmada adeegyada, waxay idiin hayaan adeeg kharash la'aan . So [facility] [phone].    ATENCIÓN: Si habla español, tiene a rogers disposición servicios gratuitos de asistencia lingüística. Llame al [phone].    We comply with applicable federal civil rights laws and [place] laws. We do not discriminate on the basis of race, color, national origin, age, disability, sex, sexual orientation, or gender identity.            Thank you!     Thank you for choosing [facility]  for your care. Our goal is always to provide you with excellent care. Hearing back from our patients is one way we can continue to improve our services. Please take a few minutes to complete the written survey that you may receive in the mail after your visit with us. Thank you!             Your Updated Medication List - Protect others around you: Learn how to safely use, store and throw away your medicines at www.disposemymeds.org.          This list is accurate as of 5/1/18 11:59 PM.  Always use your most recent med list.                   Brand Name Dispense Instructions for use Diagnosis    diclofenac 1 % Gel topical gel    VOLTAREN    100 g    Place onto the skin 4 times daily    Osteoarthritis of thumbs, bilateral       IBUPROFEN PO           methylphenidate 10 MG tablet   Start taking on:  6/30/2018    RITALIN    150 tablet    2 am, 2 mid day, 1 at hs    Attention deficit hyperactivity disorder (ADHD), predominantly inattentive type

## 2018-05-02 LAB
ALBUMIN SERPL-MCNC: 4.2 G/DL (ref 3.4–5)
ALP SERPL-CCNC: 82 U/L (ref 40–150)
ALT SERPL W P-5'-P-CCNC: 27 U/L (ref 0–70)
ANION GAP SERPL CALCULATED.3IONS-SCNC: 8 MMOL/L (ref 3–14)
AST SERPL W P-5'-P-CCNC: 18 U/L (ref 0–45)
BILIRUB SERPL-MCNC: 0.4 MG/DL (ref 0.2–1.3)
BUN SERPL-MCNC: 17 MG/DL (ref 7–30)
CALCIUM SERPL-MCNC: 9.3 MG/DL (ref 8.5–10.1)
CHLORIDE SERPL-SCNC: 107 MMOL/L (ref 94–109)
CHOLEST SERPL-MCNC: 234 MG/DL
CO2 SERPL-SCNC: 26 MMOL/L (ref 20–32)
CREAT SERPL-MCNC: 0.79 MG/DL (ref 0.66–1.25)
GFR SERPL CREATININE-BSD FRML MDRD: >90 ML/MIN/1.7M2
GLUCOSE SERPL-MCNC: 118 MG/DL (ref 70–99)
HDLC SERPL-MCNC: 45 MG/DL
LDLC SERPL CALC-MCNC: 166 MG/DL
NONHDLC SERPL-MCNC: 189 MG/DL
POTASSIUM SERPL-SCNC: 4.1 MMOL/L (ref 3.4–5.3)
PROT SERPL-MCNC: 7.3 G/DL (ref 6.8–8.8)
SODIUM SERPL-SCNC: 141 MMOL/L (ref 133–144)
TRIGL SERPL-MCNC: 114 MG/DL

## 2018-05-03 ENCOUNTER — TELEPHONE (OUTPATIENT)
Dept: FAMILY MEDICINE | Facility: CLINIC | Age: 58
End: 2018-05-03

## 2018-05-03 DIAGNOSIS — E78.5 HYPERLIPIDEMIA LDL GOAL <160: Primary | ICD-10-CM

## 2018-05-03 PROBLEM — R73.09 ELEVATED GLUCOSE: Status: ACTIVE | Noted: 2018-05-03

## 2018-05-03 LAB
HCV AB SERPL QL IA: NONREACTIVE
HIV 1+2 AB+HIV1 P24 AG SERPL QL IA: NONREACTIVE

## 2018-05-03 RX ORDER — ATORVASTATIN CALCIUM 40 MG/1
40 TABLET, FILM COATED ORAL DAILY
Qty: 90 TABLET | Refills: 3 | Status: SHIPPED | OUTPATIENT
Start: 2018-05-03 | End: 2018-05-29

## 2018-05-03 NOTE — PROGRESS NOTES
Heart attack risk is considered high, more than 1 and 10.  I would recommend a medicine, atorvastatin, to reduce your risk.  In addition, you are thinking about diabetes.  We should look into that further in the next few months.  May I send the prescription to your pharmacy?  MITALI GLASS

## 2018-05-04 ENCOUNTER — THERAPY VISIT (OUTPATIENT)
Dept: PHYSICAL THERAPY | Facility: CLINIC | Age: 58
End: 2018-05-04
Payer: COMMERCIAL

## 2018-05-04 DIAGNOSIS — M25.511 BILATERAL SHOULDER PAIN, UNSPECIFIED CHRONICITY: ICD-10-CM

## 2018-05-04 DIAGNOSIS — M25.512 BILATERAL SHOULDER PAIN, UNSPECIFIED CHRONICITY: ICD-10-CM

## 2018-05-04 PROCEDURE — 97110 THERAPEUTIC EXERCISES: CPT | Mod: GP | Performed by: PHYSICAL THERAPY ASSISTANT

## 2018-05-04 NOTE — MR AVS SNAPSHOT
"              After Visit Summary   5/4/2018    Parveen Flor    MRN: [id number]           Patient Information     Date Of Birth          1960        Visit Information        Provider Department      5/4/2018 7:40 AM Isabelle Wang PTA [facility]        Today's Diagnoses     Bilateral shoulder pain, unspecified chronicity           Follow-ups after your visit        Your next 10 appointments already scheduled     May 08, 2018 12:30 PM CDT   [facility] Hand with [facility]AL[facility] ([facility] Silvestre  )    [address] [facility]  [address]  [facility] [address]   [phone]              Who to contact     If you have questions or need follow up information about today's clinic visit or your schedule please contact [facility] directly at [phone].  Normal or non-critical lab and imaging results will be communicated to you by Safe N Clearhart, letter or phone within 4 business days after the clinic has received the results. If you do not hear from us within 7 days, please contact the clinic through MyChart or phone. If you have a critical or abnormal lab result, we will notify you by phone as soon as possible.  Submit refill requests through Tapastreet or call your pharmacy and they will forward the refill request to us. Please allow 3 business days for your refill to be completed.          Additional Information About Your Visit        MyChart Information     Tapastreet lets you send messages to your doctor, view your test results, renew your prescriptions, schedule appointments and more. To sign up, go to [email or web address] . Click on \"Log in\" on the left side of the screen, which will take you to the Welcome page. Then click on \"Sign up Now\" on the right side of the page.     You will be asked to enter the access code listed below, as well as some personal information. Please follow the directions to create your username and password.     Your access code is: " [email or web address]  Expires: 2018  6:24 PM     Your access code will  in 90 days. If you need help or a new code, please call your [place] clinic or [phone].        Care EveryWhere ID     This is your Care EveryWhere ID. This could be used by other organizations to access your [place] medical records  [id number]         Blood Pressure from Last 3 Encounters:   18 135/83   18 130/76   18 152/88    Weight from Last 3 Encounters:   18 71.7 kg (158 lb)   18 76.2 kg (168 lb)   18 74.4 kg (164 lb)              We Performed the Following     Therapeutic Exercises        Primary Care Provider Office Phone # Fax #    Leon Moss [phone] [address] [facility] [address]        Equal Access to Services     [facility] : Hadii aad ku hadasho Soomaali, waaxda luqadaha, qaybta kaalmada adeegyada, waxay johnin hayrosyn talia cruz . So [facility] [phone].    ATENCIÓN: Si habla español, tiene a rogers disposición servicios gratuitos de asistencia lingüística. Llame al [phone].    We comply with applicable federal civil rights laws and [place] laws. We do not discriminate on the basis of race, color, national origin, age, disability, sex, sexual orientation, or gender identity.            Thank you!     Thank you for choosing [facility]  for your care. Our goal is always to provide you with excellent care. Hearing back from our patients is one way we can continue to improve our services. Please take a few minutes to complete the written survey that you may receive in the mail after your visit with us. Thank you!             Your Updated Medication List - Protect others around you: Learn how to safely use, store and throw away your medicines at www.disposemymeds.org.          This list is accurate as of 18 11:03 AM.  Always use your most recent med list.                   Brand Name Dispense Instructions for use Diagnosis     atorvastatin 40 MG tablet    LIPITOR    90 tablet    Take 1 tablet (40 mg) by mouth daily    Hyperlipidemia LDL goal <160       diclofenac 1 % Gel topical gel    VOLTAREN    100 g    Place onto the skin 4 times daily    Osteoarthritis of thumbs, bilateral       IBUPROFEN PO           methylphenidate 10 MG tablet   Start taking on:  6/30/2018    RITALIN    150 tablet    2 am, 2 mid day, 1 at hs    Attention deficit hyperactivity disorder (ADHD), predominantly inattentive type

## 2018-05-08 ENCOUNTER — THERAPY VISIT (OUTPATIENT)
Dept: OCCUPATIONAL THERAPY | Facility: CLINIC | Age: 58
End: 2018-05-08
Payer: COMMERCIAL

## 2018-05-08 DIAGNOSIS — M18.11 PRIMARY OSTEOARTHRITIS OF FIRST CARPOMETACARPAL JOINT OF RIGHT HAND: ICD-10-CM

## 2018-05-08 DIAGNOSIS — M79.645 BILATERAL THUMB PAIN: ICD-10-CM

## 2018-05-08 DIAGNOSIS — M79.644 BILATERAL THUMB PAIN: ICD-10-CM

## 2018-05-08 DIAGNOSIS — M18.12 PRIMARY OSTEOARTHRITIS OF FIRST CARPOMETACARPAL JOINT OF LEFT HAND: ICD-10-CM

## 2018-05-08 PROCEDURE — 97140 MANUAL THERAPY 1/> REGIONS: CPT | Mod: GO | Performed by: OCCUPATIONAL THERAPIST

## 2018-05-08 PROCEDURE — 97110 THERAPEUTIC EXERCISES: CPT | Mod: GO | Performed by: OCCUPATIONAL THERAPIST

## 2018-05-15 ENCOUNTER — THERAPY VISIT (OUTPATIENT)
Dept: OCCUPATIONAL THERAPY | Facility: CLINIC | Age: 58
End: 2018-05-15
Payer: COMMERCIAL

## 2018-05-15 DIAGNOSIS — M18.12 PRIMARY OSTEOARTHRITIS OF FIRST CARPOMETACARPAL JOINT OF LEFT HAND: ICD-10-CM

## 2018-05-15 DIAGNOSIS — M79.644 BILATERAL THUMB PAIN: ICD-10-CM

## 2018-05-15 DIAGNOSIS — M79.645 BILATERAL THUMB PAIN: ICD-10-CM

## 2018-05-15 DIAGNOSIS — M18.11 PRIMARY OSTEOARTHRITIS OF FIRST CARPOMETACARPAL JOINT OF RIGHT HAND: ICD-10-CM

## 2018-05-15 PROCEDURE — 97140 MANUAL THERAPY 1/> REGIONS: CPT | Mod: GO | Performed by: OCCUPATIONAL THERAPIST

## 2018-05-15 PROCEDURE — 97110 THERAPEUTIC EXERCISES: CPT | Mod: GO | Performed by: OCCUPATIONAL THERAPIST

## 2018-05-15 NOTE — MR AVS SNAPSHOT
"              After Visit Summary   5/15/2018    Parveen Flor    MRN: [id number]           Patient Information     Date Of Birth          1960        Visit Information        Provider Department      5/15/2018 10:30 AM Eileen Benitez OT [facility]        Today's Diagnoses     Bilateral thumb pain        Primary osteoarthritis of first carpometacarpal joint of left hand        Primary osteoarthritis of first carpometacarpal joint of right hand           Follow-ups after your visit        Who to contact     If you have questions or need follow up information about today's clinic visit or your schedule please contact [facility] directly at [phone].  Normal or non-critical lab and imaging results will be communicated to you by Talkspacehart, letter or phone within 4 business days after the clinic has received the results. If you do not hear from us within 7 days, please contact the clinic through Talkspacehart or phone. If you have a critical or abnormal lab result, we will notify you by phone as soon as possible.  Submit refill requests through [employer] or call your pharmacy and they will forward the refill request to us. Please allow 3 business days for your refill to be completed.          Additional Information About Your Visit        MyChart Information     [employer] lets you send messages to your doctor, view your test results, renew your prescriptions, schedule appointments and more. To sign up, go to [email or web address] . Click on \"Log in\" on the left side of the screen, which will take you to the Welcome page. Then click on \"Sign up Now\" on the right side of the page.     You will be asked to enter the access code listed below, as well as some personal information. Please follow the directions to create your username and password.     Your access code is: [email or web address]  Expires: 2018  6:24 PM     Your access code will  in 90 days. If you need help or a new code, " please call your [place] clinic or [phone].        Care EveryWhere ID     This is your Care EveryWhere ID. This could be used by other organizations to access your [place] medical records  [id number]         Blood Pressure from Last 3 Encounters:   05/01/18 135/83   04/02/18 130/76   03/14/18 152/88    Weight from Last 3 Encounters:   05/01/18 71.7 kg (158 lb)   04/02/18 76.2 kg (168 lb)   03/14/18 74.4 kg (164 lb)              We Performed the Following     MANUAL THER TECH,1+REGIONS,EA 15 MIN     THERAPEUTIC EXERCISES        Primary Care Provider Office Phone # Fax #    Leon Moss [phone] [address] [facility] [address]        Equal Access to Services     [facility]: Hadii aad ku hadasho Soomaali, waaxda luqadaha, qaybta kaalmada adeegyada, waxay julieta hayrosyn talia cruz . So [facility] [phone].    ATENCIÓN: Si habla español, tiene a rogers disposición servicios gratuitos de asistencia lingüística. Arpan al [phone].    We comply with applicable federal civil rights laws and [place] laws. We do not discriminate on the basis of race, color, national origin, age, disability, sex, sexual orientation, or gender identity.            Thank you!     Thank you for choosing [facility]  for your care. Our goal is always to provide you with excellent care. Hearing back from our patients is one way we can continue to improve our services. Please take a few minutes to complete the written survey that you may receive in the mail after your visit with us. Thank you!             Your Updated Medication List - Protect others around you: Learn how to safely use, store and throw away your medicines at www.disposemymeds.org.          This list is accurate as of 5/15/18 11:08 AM.  Always use your most recent med list.                   Brand Name Dispense Instructions for use Diagnosis    atorvastatin 40 MG tablet    LIPITOR    90 tablet    Take 1 tablet (40 mg)  by mouth daily    Hyperlipidemia LDL goal <160       diclofenac 1 % Gel topical gel    VOLTAREN    100 g    Place onto the skin 4 times daily    Osteoarthritis of thumbs, bilateral       IBUPROFEN PO           methylphenidate 10 MG tablet   Start taking on:  6/30/2018    RITALIN    150 tablet    2 am, 2 mid day, 1 at hs    Attention deficit hyperactivity disorder (ADHD), predominantly inattentive type

## 2018-05-15 NOTE — PROGRESS NOTES
Progress Note/Discharge - Hand Therapy  Current Date:  5/15/2018  Reporting period is 3/29/18 to 5/15/2018    Subjectve:     Parveen Flor is a 57 year old right hand dominant male.    Diagnosis: Bilateral thumb OA  DOI:  3/1/18 MD order date      Subjective changes as noted by patient:  Splints definitely help. My hands feel like they have more motion. Hard to christina pain because I can use my thumbs for activity, but they hurt. Discussed built up handles for drum sticks. Hand the flexibility is great.   Functional changes noted by patient:  Improvement in Self Care Tasks (dressing), Work Tasks, Sleep Patterns, Recreational Activities, Household Chores and Driving   Patient has noted adverse reaction to:  None    Objective:  Changes noted in objective findings: Yes, ROM, pain, and strength    Upper Extremity Functional Index Score:  SCORE:   Column Totals: /80: 45   (A lower score indicates greater disability.)        Objective:  Pain Level Report: On scale 0-10/10  Date 3/29/2018   5/15/18   side R L R L   Overall 8 7.5 7 6   At Rest 7 6.5 4 3   With Activity 9 8.5 7 6     Report of Pain:  Location: Bilateral thumbs  Pain Quality:  Stabbing  Frequency: constant    Pain is worst:  In the morning  Exacerbated by:  Using thumbs  Relieved by:  Rest, Ibuprofen   Progression:  Unchanged, but my hands motion is better      Tenderness:  Date 3/29/2018 5/15/18   R CMC of the Thumb + +   L CMC of the Thumb + -     Appearance:  Date 3/29/2018 3/29/18  5/15/18   Side R L R L   Shoulder deformity is present over the CMC + + + +   Volar subluxation present - - - -   Edema over the CMC joint - - - -   Noted collapse of MP into hyperextension during pinch - - - -     Thumb Range of Motion:  AROM (PROM)  Date 3/29/2018 3/29/2018  5/15/18   Side: R L R L   MCP ext -35 -20 -30 -19   MCP flex 70 62 65 65   IP ext 0 0 0 0   IP flex 56 55 60 60   RABD 45 50 46 45   PABD 45 46 46 50     Special Tests:  Date 3/29/2018 3/29/18   5/15/18   Side R L R L   Grind + + + +   Passive Retroposition + + + -     Strength:   (Measured in pounds)  Pain Report:  - none    + mild    ++ moderate    +++ severe     3/29/2018  5/15/18   Trials R L R L   1  2  3 51 43 80 82   Average:         Lat Pinch  3/29/2018  5/15/18   Trials R L R L   1  2  3 3 4 9 13   Average:         3 Pt Pinch  3/29/2018  5/15/18   Trials R L R L   1  2  3 4 6 6 10   Average:           Assessment:  Response to therapy has been improvement to:  ROM of Thumb:  All Planes  Strength:   and pinch    Overall Assessment:  Patient's symptoms are resolving.  STG/LTG:  STGoals have been reviewed;  see goal sheet for details and updates.  LTGoals have been reviewed;  see goal sheet for details and updates.    I have re-evaluated this patient and find that the nature, scope, duration and intensity of the therapy is appropriate for the medical condition of the patient.      Plan  Frequency:  Pt. To continue to complete HEP independent at home for 3 weeks and schedule if needed. If no return will assume all goals met to patient satisfaction and discharge [facility]      Treatment Explanations:  The following has been discussed with the patient,  Rx ordered/plan of care  Anticipated outcomes  Possible risks and side effects      Treatment Plan:  Therapeutic Exercise: AROM, Isometrics, and Stabilization exercises of the Thumb CMC, including  active and resisted abduction, 1st DI strengthening.  Manual Techniques: Joint Mobilization or reseating of the trapezium  Orthosis:  Hand based Thumb Spica, CMC support (allowing more thumb mobility)  Education: anatomy of CMC, joint protection principles, adaptive equipment as needed.    Home Program:  Exercise: Place and hold of the Stabilization Program 1-2 times a day  Orthosis: Hand based Thumb Spica initially during the day progressing to at night, CMC functional splint during the day  Activity: incorporate joint protection into daily functional  activities, adaptive equipment as needed.    Discharge Plan:  Achieve all LTG  [place] in home treatment program.  Reach maximal therapeutic benefit.  Please refer to the daily flowsheet for treatment today and total treatment time.

## 2018-05-29 ENCOUNTER — OFFICE VISIT (OUTPATIENT)
Dept: FAMILY MEDICINE | Facility: CLINIC | Age: 58
End: 2018-05-29
Payer: COMMERCIAL

## 2018-05-29 VITALS
RESPIRATION RATE: 14 BRPM | TEMPERATURE: 98.2 F | WEIGHT: 162 LBS | DIASTOLIC BLOOD PRESSURE: 60 MMHG | BODY MASS INDEX: 22.68 KG/M2 | HEIGHT: 71 IN | HEART RATE: 64 BPM | SYSTOLIC BLOOD PRESSURE: 100 MMHG

## 2018-05-29 DIAGNOSIS — R73.09 ELEVATED GLUCOSE: ICD-10-CM

## 2018-05-29 DIAGNOSIS — M18.12 PRIMARY OSTEOARTHRITIS OF FIRST CARPOMETACARPAL JOINT OF LEFT HAND: ICD-10-CM

## 2018-05-29 DIAGNOSIS — E78.5 HYPERLIPIDEMIA LDL GOAL <160: ICD-10-CM

## 2018-05-29 DIAGNOSIS — F90.0 ATTENTION DEFICIT HYPERACTIVITY DISORDER (ADHD), PREDOMINANTLY INATTENTIVE TYPE: Primary | ICD-10-CM

## 2018-05-29 DIAGNOSIS — M18.11 PRIMARY OSTEOARTHRITIS OF FIRST CARPOMETACARPAL JOINT OF RIGHT HAND: ICD-10-CM

## 2018-05-29 LAB — HBA1C MFR BLD: 5.2 % (ref 0–5.6)

## 2018-05-29 PROCEDURE — 99214 OFFICE O/P EST MOD 30 MIN: CPT | Performed by: FAMILY MEDICINE

## 2018-05-29 PROCEDURE — 83036 HEMOGLOBIN GLYCOSYLATED A1C: CPT | Performed by: FAMILY MEDICINE

## 2018-05-29 PROCEDURE — 36415 COLL VENOUS BLD VENIPUNCTURE: CPT | Performed by: FAMILY MEDICINE

## 2018-05-29 RX ORDER — ATORVASTATIN CALCIUM 40 MG/1
40 TABLET, FILM COATED ORAL DAILY
Qty: 90 TABLET | Refills: 3 | Status: SHIPPED | OUTPATIENT
Start: 2018-05-29 | End: 2019-05-09

## 2018-05-29 RX ORDER — METHYLPHENIDATE HYDROCHLORIDE 10 MG/1
TABLET ORAL
Qty: 150 TABLET | Refills: 0 | Status: SHIPPED | OUTPATIENT
Start: 2018-06-30 | End: 2018-08-14

## 2018-05-29 NOTE — LETTER
May 31, 2018      Parveen DUNNE Chacho  [address] [address]  [place] [address]        Dear ,    We are writing to inform you of your test results.    Tests are all normal. GREAT!    Resulted Orders   Hemoglobin A1c   Result Value Ref Range    Hemoglobin A1C 5.2 0 - 5.6 %      Comment:      Normal <5.7% Prediabetes 5.7-6.4%  Diabetes 6.5% or higher - adopted from ADA   consensus guidelines.         If you have any questions or concerns, please call the clinic at the number listed above.       Sincerely,        Leon Moss MD/lf

## 2018-05-29 NOTE — PROGRESS NOTES
SUBJECTIVE:   Parveen Flor is a 57 year old male who presents to clinic today for the following health issues:      Medication Followup of Lipitor and Ritalin     Taking Medication as prescribed: yes    Side Effects:  None    Medication Helping Symptoms:  yes           Problem list and histories reviewed & adjusted, as indicated.  Additional history: none        Reviewed and updated as needed this visit by clinical staff  Tobacco  Allergies  Meds  Med Hx  Surg Hx  Fam Hx  Soc Hx      Reviewed and updated as needed this visit by Provider         Attention deficit hyperactivity disorder (ADHD), predominantly inattentive type  (primary encounter diagnosis) have already been printed in the past.  Refill today is marked void,  and shredded  Hyperlipidemia LDL goal <160 on a statin he now has custom braces  Primary osteoarthritis of first carpometacarpal joint of right hand  Primary osteoarthritis of first carpometacarpal joint of left hand he was skeptical of hand therapy is now impressed he is practicing exercises he has braces  Elevated glucose   Glucose   Date Value Ref Range Status   05/01/2018 118 (H) 70 - 99 mg/dL Final     Comment:     Non Fasting   ]    Past Medical History:   Diagnosis Date     Attention deficit disorder, unspecified hyperactivity presence 3/1/2018     Bilateral low back pain without sciatica, unspecified chronicity 3/1/2018     Chemical dependency (H)     Sober x 7 years.     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 No pertinent surgical history.    Family History   Problem Relation Age of Onset     HEART DISEASE Paternal Grandmother      Arthritis Paternal Grandmother      Alcohol/Drug Maternal Grandfather      CANCER Maternal Grandmother      Liver       Social History   Substance Use Topics     Smoking status: Former Smoker     Packs/day: 0.50     Years: 4.00     Types: Cigarettes     Quit date: 11/2017     Smokeless tobacco: Never Used     Alcohol use No     ROS  no systemic symptoms synovitis in cmc  thumbs   occasionally obstruct use of braceswith overuse   /60 (BP Location: Right arm, Patient Position: Chair, Cuff Size: Adult Regular)  Pulse 64  Temp 98.2  F (36.8  C) (Oral)  Resp 14  Ht 5' 11\" (1.803 m)  Wt 162 lb (73.5 kg)  BMI 22.59 kg/m2  affect bright  No synovitis in hands, Heberden's and Navya's nodes  ASSESSMENT / PLAN:  (F90.0) Attention deficit hyperactivity disorder (ADHD), predominantly inattentive type  (primary encounter diagnosis)  Comment: *This prescription is voided  Plan: methylphenidate (RITALIN) 10 MG tablet        \    (E78.5) Hyperlipidemia LDL goal <160  Comment: This prescription continues  Plan: atorvastatin (LIPITOR) 40 MG tablet        Discussed    (M18.11) Primary osteoarthritis of first carpometacarpal joint of right hand  Comment: Under treatment discussed  Plan: Under treatment discussed    (M18.12) Primary osteoarthritis of first carpometacarpal joint of left hand  Comment: Under treatment discussed  Plan:     (R73.09) Elevated glucose  Comment: [place] database  Plan: Hemoglobin A1c              RTC Aug      Leon Moss MD        "

## 2018-05-29 NOTE — MR AVS SNAPSHOT
"              After Visit Summary   5/29/2018    Parveen Flor    MRN: [id number]           Patient Information     Date Of Birth          1960        Visit Information        Provider Department      5/29/2018 10:15 AM Leon Moss MD [facility]        Today's Diagnoses     Attention deficit hyperactivity disorder (ADHD), predominantly inattentive type    -  1    Hyperlipidemia LDL goal <160        Primary osteoarthritis of first carpometacarpal joint of right hand        Primary osteoarthritis of first carpometacarpal joint of left hand        Elevated glucose           Follow-ups after your visit        Follow-up notes from your care team     Return in about 3 months (around 8/29/2018).      Who to contact     If you have questions or need follow up information about today's clinic visit or your schedule please contact [facility] directly at [phone].  Normal or non-critical lab and imaging results will be communicated to you by MyChart, letter or phone within 4 business days after the clinic has received the results. If you do not hear from us within 7 days, please contact the clinic through MyChart or phone. If you have a critical or abnormal lab result, we will notify you by phone as soon as possible.  Submit refill requests through [employer] or call your pharmacy and they will forward the refill request to us. Please allow 3 business days for your refill to be completed.          Additional Information About Your Visit        Care EveryWhere ID     This is your Care EveryWhere ID. This could be used by other organizations to access your [facility] medical records  [id number]        Your Vitals Were     Pulse Temperature Respirations Height BMI (Body Mass Index)       64 98.2  F (36.8  C) (Oral) 14 5' 11\" (1.803 m) 22.59 kg/m2        Blood Pressure from Last 3 Encounters:   05/29/18 100/60   05/01/18 135/83   04/02/18 130/76    Weight from Last 3 " Encounters:   05/29/18 162 lb (73.5 kg)   05/01/18 158 lb (71.7 kg)   04/02/18 168 lb (76.2 kg)              We Performed the Following     Hemoglobin A1c          Today's Medication Changes          These changes are accurate as of 5/29/18 10:03 PM.  If you have any questions, ask your nurse or doctor.               These medicines have changed or have updated prescriptions.        Dose/Directions    methylphenidate 10 MG tablet   Commonly known as:  RITALIN   This may have changed:  These instructions start on 6/30/2018. If you are unsure what to do until then, ask your doctor or other care provider.   Used for:  Attention deficit hyperactivity disorder (ADHD), predominantly inattentive type   Changed by:  Leon Moss MD        Start taking on:  6/30/2018   2 am, 2 mid day, 1 at    Quantity:  150 tablet   Refills:  0            Where to get your medicines      These medications were sent to [address]  [address] [place] [id number]     Phone:  [phone]     atorvastatin 40 MG tablet         Some of these will need a paper prescription and others can be bought over the counter.  Ask your nurse if you have questions.     Bring a paper prescription for each of these medications     methylphenidate 10 MG tablet                Primary Care Provider Office Phone # Fax #    Leon Moss [phone] [address] [facility] [address]        Equal Access to Services     [facility] AH: Hadii nancy bell hadasho Soabhijitali, waaxda luqadaha, qaybta kaalmada adeegyada, sheela cruz . So [facility] [phone].    ATENCIÓN: Si habla español, tiene a rogers disposición servicios gratuitos de asistencia lingüística. Llame al [phone].    We comply with applicable federal civil rights laws and [place] laws. We do not discriminate on the basis of race, color, national origin, age, disability, sex, sexual  orientation, or gender identity.            Thank you!     Thank you for choosing [facility]  for your care. Our goal is always to provide you with excellent care. Hearing back from our patients is one way we can continue to improve our services. Please take a few minutes to complete the written survey that you may receive in the mail after your visit with us. Thank you!             Your Updated Medication List - Protect others around you: Learn how to safely use, store and throw away your medicines at www.disposemymeds.org.          This list is accurate as of 5/29/18 10:03 PM.  Always use your most recent med list.                   Brand Name Dispense Instructions for use Diagnosis    atorvastatin 40 MG tablet    LIPITOR    90 tablet    Take 1 tablet (40 mg) by mouth daily    Hyperlipidemia LDL goal <160       diclofenac 1 % Gel topical gel    VOLTAREN    100 g    Place onto the skin 4 times daily    Osteoarthritis of thumbs, bilateral       IBUPROFEN PO           methylphenidate 10 MG tablet   Start taking on:  6/30/2018    RITALIN    150 tablet    2 am, 2 mid day, 1 at hs    Attention deficit hyperactivity disorder (ADHD), predominantly inattentive type

## 2018-06-08 ENCOUNTER — PATIENT OUTREACH (OUTPATIENT)
Dept: CARE COORDINATION | Facility: CLINIC | Age: 58
End: 2018-06-08

## 2018-06-08 NOTE — PROGRESS NOTES
Clinic Care Coordination Contact  [facility]/Voicemail    Clinical Data: follow up on SSDI and job searching resources.    Per chart review pt has been coming to  PT and reports feeling some improvement.     Outreach attempted x 1.  Left message on voicemail with call back information and requested return call.  Plan: Care coordinator will sent a [facility] letter and review in 2 weeks.    Eileen Goins, Social Work Care Coordinator, [facility], [facility]ics: [place], [place], and [place]   P:[phone] / Signed June 8, 2018

## 2018-06-14 NOTE — PROGRESS NOTES
Clinic Care Coordination Contact    Incoming call from the patient. States things are going well  He is working with Disability specialists and has an appointment to meet with the disability MD for a physical. Wonders if [facility] will provide transportation to that appt. CCSW is unsure but encouraged him to call [facility] and discuss it with them.   Pt reports doing well. Has started PT and has seen some improvement. Has no other concerns at this time.  Aware this CCSW will be leaving the clinic and was given information on how to get help if needed in the future.    Eileen Goins, Social Work Care Coordinator, [facility], [facility]: Phone [phone]

## 2018-06-18 PROBLEM — M18.12 PRIMARY OSTEOARTHRITIS OF FIRST CARPOMETACARPAL JOINT OF LEFT HAND: Status: RESOLVED | Noted: 2018-03-29 | Resolved: 2018-06-18

## 2018-06-18 PROBLEM — M79.645 BILATERAL THUMB PAIN: Status: RESOLVED | Noted: 2018-03-29 | Resolved: 2018-06-18

## 2018-06-18 PROBLEM — M18.11 PRIMARY OSTEOARTHRITIS OF FIRST CARPOMETACARPAL JOINT OF RIGHT HAND: Status: RESOLVED | Noted: 2018-03-29 | Resolved: 2018-06-18

## 2018-06-18 PROBLEM — M79.644 BILATERAL THUMB PAIN: Status: RESOLVED | Noted: 2018-03-29 | Resolved: 2018-06-18

## 2018-06-19 NOTE — PROGRESS NOTES
Subjective:  HPI                    Objective:  System    Physical Exam    General     ROS    Assessment/Plan:    DISCHARGE REPORT    Progress reporting period is from 3/29/18 to 5/4/18.      SUBJECTIVE  Subjective changes noted by patient:  The stretches have really helped.  Less numbness in the left hand.  Numbness in the left hand is decreasing but not abolished.   Current pain level is .  Current Pain level: 4/10    Previous pain level was:   Initial Pain level: 6/10   Changes in function:  Yes (See Goal flowsheet attached for changes in current functional level) Changes in function: Yes, see goal flow sheet for change in function   Adverse reaction to treatment or activity: None     OBJECTIVE  Changes noted in objective findings:  Yes,  Patient has normal ROM of the B shoulders. MMT 5/5.  Increased patient to loaded cervical extension with pt OP.

## 2018-07-18 PROBLEM — M25.511 BILATERAL SHOULDER PAIN, UNSPECIFIED CHRONICITY: Status: RESOLVED | Noted: 2018-03-29 | Resolved: 2018-07-18

## 2018-07-18 PROBLEM — M25.512 BILATERAL SHOULDER PAIN, UNSPECIFIED CHRONICITY: Status: RESOLVED | Noted: 2018-03-29 | Resolved: 2018-07-18

## 2018-07-18 NOTE — PROGRESS NOTES
Subjective:  HPI                    Objective:  System    Physical Exam    General     ROS    Assessment/Plan:    ASSESSMENT/PLAN  Updated problem list and treatment plan: Diagnosis 1:  Bilateral shoulder pain  Pain -  hot/cold therapy, self management, education and home program  Decreased ROM/flexibility - therapeutic exercise, therapeutic activity and home program  Decreased strength - therapeutic exercise, therapeutic activities and home program  Progress toward STG/LTGs have been made:  Yes,   Assessment of Progress: The patient's condition is improving.  Self Management Plans:  Patient has been instructed in a home treatment program.  I have re-evaluated this patient and find that the nature, scope, duration and intensity of the therapy is appropriate for the medical condition of the patient.  Parveen continues to require the following intervention to meet STG and LT's:  PT intervention is no longer required to meet STG/LTG.    Recommendations:  This patient is ready to be discharged from therapy and continue their home treatment program.    Please refer to the daily flowsheet for treatment today, total treatment time and time spent performing 1:1 timed codes.

## 2018-08-14 ENCOUNTER — OFFICE VISIT (OUTPATIENT)
Dept: FAMILY MEDICINE | Facility: CLINIC | Age: 58
End: 2018-08-14
Payer: COMMERCIAL

## 2018-08-14 VITALS
HEIGHT: 71 IN | SYSTOLIC BLOOD PRESSURE: 138 MMHG | DIASTOLIC BLOOD PRESSURE: 88 MMHG | TEMPERATURE: 97.8 F | WEIGHT: 164 LBS | RESPIRATION RATE: 16 BRPM | HEART RATE: 66 BPM | BODY MASS INDEX: 22.96 KG/M2

## 2018-08-14 DIAGNOSIS — M18.0 OSTEOARTHRITIS OF THUMBS, BILATERAL: ICD-10-CM

## 2018-08-14 DIAGNOSIS — M72.0 DUPUYTREN'S CONTRACTURE OF BOTH HANDS: ICD-10-CM

## 2018-08-14 DIAGNOSIS — Z79.899 CONTROLLED SUBSTANCE AGREEMENT SIGNED: ICD-10-CM

## 2018-08-14 DIAGNOSIS — F90.0 ATTENTION DEFICIT HYPERACTIVITY DISORDER (ADHD), PREDOMINANTLY INATTENTIVE TYPE: Primary | ICD-10-CM

## 2018-08-14 PROCEDURE — 99214 OFFICE O/P EST MOD 30 MIN: CPT | Performed by: FAMILY MEDICINE

## 2018-08-14 RX ORDER — METHYLPHENIDATE HYDROCHLORIDE 10 MG/1
TABLET ORAL
Qty: 150 TABLET | Refills: 0 | Status: SHIPPED | OUTPATIENT
Start: 2018-10-13 | End: 2018-11-13

## 2018-08-14 RX ORDER — METHYLPHENIDATE HYDROCHLORIDE 10 MG/1
TABLET ORAL
Qty: 150 TABLET | Refills: 0 | Status: SHIPPED | OUTPATIENT
Start: 2018-09-13 | End: 2018-08-14

## 2018-08-14 RX ORDER — METHYLPHENIDATE HYDROCHLORIDE 10 MG/1
TABLET ORAL
Qty: 150 TABLET | Refills: 0 | Status: SHIPPED | OUTPATIENT
Start: 2018-08-14 | End: 2018-08-14

## 2018-08-14 NOTE — LETTER
[facility]    08/14/18    Patient: Parveen Flor  YOB: 1960  Medical Record Number: [id number]                                                                  Controlled Substance Agreement  I understand that my care provider has prescribed controlled substances (narcotics, tranquilizers, and/or stimulants) to help manage my condition(s).  I am taking this medicine to help me function or work.  I know that this is strong medicine.  It could have serious side effects and even cause a dependency on the drug.  If I stop these medicines suddenly, I could have severe withdrawal symptoms.    The risks, benefits, and side effects of these medication(s) were explained to me.  I agree that:  1. I will take part in other treatments as advised by my provider.  This may be psychiatry or counseling, physical therapy, behavioral therapy, group treatment, or a referral to a pain clinic.  I will reduce or stop my medicine when my provider tells me to do so.   2. I will take my medicines as prescribed.  I will not change the dose or schedule unless my provider tells me to.  There will be no refills if I  run out early.   I may be contacted at any time without warning and asked to complete a drug test or pill count.   3. I will keep all my appointments at the clinic.  If I miss appointments or fail to follow instructions, my provider may stop my medicine.  4. I will not ask other providers to prescribe controlled substances. And I will not accept controlled substances from other people. If I need another prescribed controlled substance for a new reason, I will notify my provider within one business day.  5. If I enroll in the [place] Medical Marijuana program, I will tell my provider.  I will also sign an agreement to share my medical records with my provider.  6. I will use one pharmacy to fill all of my controlled substance prescriptions.  If my prescription is mailed to my pharmacy, it  may take 5 to 7 days for my medicine to be ready.  7. I understand that my provider, clinic care team, and pharmacy can track controlled substance prescriptions from other providers through a central database (prescription monitoring program).  8. I will bring in my list of medications (or my medicine bottles) each time I come to the clinic.  [id number] REV-  07/2018                                                                                                                                   Page 1 of 2      [facility]    08/14/18    Patient: Parveen Flor  YOB: 1960  Medical Record Number: [id number]    9. Refills of controlled substances will be made only during office hours.  It is up to me to make sure that I do not run out of my medicines on weekends or holidays.    10. I am responsible for my prescriptions.  If the medicine/prescription is lost or stolen, it will not be replaced.   I also agree not to share these medicines with anyone.  11. I agree to not use ANY illegal or recreational drugs.  This includes marijuana, cocaine, bath salts or other drugs.  I agree not to use alcohol unless my provider says I may.  I agree to give urine samples whenever asked.  If I fail to give a urine sample, the provider may stop my medicine.     12. I will tell my nurse or provider right away if I become pregnant or have a new medical problem treated outside of [facility].  13. I understand that this medicine can affect my thinking and judgment.  It may be unsafe for me to drive, use machinery and do dangerous tasks.  I will not do any of these things until I know how the medicine affects me.  If my dose changes, I will wait to see how it affects me.  I will contact my provider if I have concerns about medicine side effects.  I understand that if I do not follow any of the conditions above, my prescriptions or treatment may be stopped.    I agree that my provider, clinic care team,  and pharmacy may work with any city, state or federal law enforcement agency that investigates the misuse, sale, or other diversion of my controlled medicine. I will allow my provider to discuss my care with or share a copy of this agreement with any other treating provider, pharmacy or emergency room where I receive care.  I agree to give up (waive) any right of privacy or confidentiality with respect to these authorizations.   I have read this agreement and have asked questions about anything I did not understand.   ___________________________________    ___________________________  Patient Signature                                                           Date and Time  ___________________________________     ____________________________  Witness                                                                            Date and Time  ___________________________________  Leon Moss MD  [id number] REV-  07/2018                                                                                                                                                   Page 2 of 2

## 2018-08-14 NOTE — MR AVS SNAPSHOT
"              After Visit Summary   8/14/2018    Parveen Flor    MRN: [id number]           Patient Information     Date Of Birth          1960        Visit Information        Provider Department      8/14/2018 9:45 AM Leon Moss MD [facility]        Today's Diagnoses     Attention deficit hyperactivity disorder (ADHD), predominantly inattentive type    -  1    Dupuytren's contracture of both hands        Controlled substance agreement signed        Osteoarthritis of thumbs, bilateral           Follow-ups after your visit        Follow-up notes from your care team     Return in about 3 months (around 11/14/2018) for recheck.      Who to contact     If you have questions or need follow up information about today's clinic visit or your schedule please contact [facility] directly at [phone].  Normal or non-critical lab and imaging results will be communicated to you by MyChart, letter or phone within 4 business days after the clinic has received the results. If you do not hear from us within 7 days, please contact the clinic through MyChart or phone. If you have a critical or abnormal lab result, we will notify you by phone as soon as possible.  Submit refill requests through [employer] or call your pharmacy and they will forward the refill request to us. Please allow 3 business days for your refill to be completed.          Additional Information About Your Visit        Care EveryWhere ID     This is your Care EveryWhere ID. This could be used by other organizations to access your [place] medical records  [id number]        Your Vitals Were     Pulse Temperature Respirations Height BMI (Body Mass Index)       66 97.8  F (36.6  C) (Oral) 16 5' 11\" (1.803 m) 22.87 kg/m2        Blood Pressure from Last 3 Encounters:   08/14/18 138/88   05/29/18 100/60   05/01/18 135/83    Weight from Last 3 Encounters:   08/14/18 164 lb (74.4 kg)   05/29/18 162 lb (73.5 kg) "   05/01/18 158 lb (71.7 kg)              Today, you had the following     No orders found for display         Today's Medication Changes          These changes are accurate as of 8/14/18 11:59 PM.  If you have any questions, ask your nurse or doctor.               Start taking these medicines.        Dose/Directions    methylphenidate 10 MG tablet   Commonly known as:  RITALIN   Used for:  Attention deficit hyperactivity disorder (ADHD), predominantly inattentive type   Started by:  Leon Moss MD        Start taking on:  10/13/2018   2 am, 2 mid day, 1 at    Quantity:  150 tablet   Refills:  0            Where to get your medicines      Some of these will need a paper prescription and others can be bought over the counter.  Ask your nurse if you have questions.     Bring a paper prescription for each of these medications     methylphenidate 10 MG tablet                Primary Care Provider Office Phone # Fax #    Leon Moss [phone] [address] [facility] [address]        Equal Access to Services     [facility] AngelesLE[facility] AH: Hadii nancy ku hadasho Soomaali, waaxda luqadaha, qaybta kaalmada adeegyada, waxay johnin hayrosyn talia cruz . So [facility] [phone].    ATENCIÓN: Si habla español, tiene a rogers disposición servicios gratuitos de asistencia lingüística. Llame al [phone].    We comply with applicable federal civil rights laws and [place] laws. We do not discriminate on the basis of race, color, national origin, age, disability, sex, sexual orientation, or gender identity.            Thank you!     Thank you for choosing [facility]  for your care. Our goal is always to provide you with excellent care. Hearing back from our patients is one way we can continue to improve our services. Please take a few minutes to complete the written survey that you may receive in the mail after your visit with us. Thank you!             Your Updated Medication List -  Protect others around you: Learn how to safely use, store and throw away your medicines at www.disposemymeds.org.          This list is accurate as of 8/14/18 11:59 PM.  Always use your most recent med list.                   Brand Name Dispense Instructions for use Diagnosis    atorvastatin 40 MG tablet    LIPITOR    90 tablet    Take 1 tablet (40 mg) by mouth daily    Hyperlipidemia LDL goal <160       diclofenac 1 % Gel topical gel    VOLTAREN    100 g    Place onto the skin 4 times daily    Osteoarthritis of thumbs, bilateral       IBUPROFEN PO           methylphenidate 10 MG tablet   Start taking on:  10/13/2018    RITALIN    150 tablet    2 am, 2 mid day, 1 at hs    Attention deficit hyperactivity disorder (ADHD), predominantly inattentive type

## 2018-08-14 NOTE — PROGRESS NOTES
SUBJECTIVE:   Parveen Flor is a 57 year old male who presents to clinic today for the following health issues:    Medication Followup of refills     Taking Medication as prescribed: yes    Side Effects:  None    Medication Helping Symptoms:  NO     Dupuytren's contracture of both hands  (primary encounter diagnosis) - Patient is wearing custom foam braces. It is working well for him. He reports that his hands are quite functional.  Attention deficit hyperactivity disorder (ADHD), predominantly inattentive type stable use of stimulants  Controlled substance agreement signed - anew    Problem list and histories reviewed & adjusted, as indicated.  Additional history: none    Past Medical History:   Diagnosis Date     Attention deficit disorder, unspecified hyperactivity presence 3/1/2018     Bilateral low back pain without sciatica, unspecified chronicity 3/1/2018     Chemical dependency (H)     Sober x 7 years.     Controlled substance agreement signed 3/1/2018     Dupuytren's contracture of both hands 3/1/2018     Elevated glucose 5/3/2018     Hyperlipidemia LDL goal <160 5/3/2018    The 10-year ASCVD risk score ([place] ELIAS Jr, et al., 2013) is: 10.9%   Values used to calculate the score:     Age: 57 years     Sex: Male     Is Non- : No     Diabetic: No     Tobacco smoker: No     Systolic Blood Pressure: 135 mmHg     Is BP treated: No     HDL Cholesterol: 45 mg/dL     Total Cholesterol: 234 mg/dL      Leukoplakia of tongue 3/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 No pertinent surgical history.    Family History   Problem Relation Age of Onset     HEART DISEASE Paternal Grandmother      Arthritis Paternal Grandmother      Alcohol/Drug Maternal Grandfather      Cancer Maternal Grandmother      Liver  "      Social History   Substance Use Topics     Smoking status: Former Smoker     Packs/day: 0.50     Years: 4.00     Types: Cigarettes     Quit date: 11/2017     Smokeless tobacco: Never Used     Alcohol use No     Reviewed and updated as needed this visit by clinical staff  Tobacco  Allergies  Meds  Med Hx  Surg Hx  Fam Hx  Soc Hx      Reviewed and updated as needed this visit by Provider       ROS:  Denies pain in hands, denies restricted mobility, he has painless varicose veins specializes needs he is concerned about    This document serves as a record of the services and decisions personally performed and made by Leon Moss MD. It was created on her behalf by Rosanna Reyes, a trained medical scribe. The creation of this document is based on the provider's statements to the medical scribe.  Rosanna Reyes 10:45 AM August 14, 2018    OBJECTIVE:     /88 (BP Location: Left arm, Patient Position: Chair, Cuff Size: Adult Regular)  Pulse 66  Temp 97.8  F (36.6  C) (Oral)  Resp 16  Ht 1.803 m (5' 11\")  Wt 74.4 kg (164 lb)  BMI 22.87 kg/m2  Body mass index is 22.87 kg/(m^2).    GENERAL: no acute distress  SKIN: varicose veins in both LL no thrombosis  MUSCULOSKELETAL: hand braces bilaterally.  Dupuytren's contractures.  Excellent finger extension    Results for orders placed or performed in visit on 05/29/18   Hemoglobin A1c   Result Value Ref Range    Hemoglobin A1C 5.2 0 - 5.6 %     ASSESSMENT/PLAN:     ASSESSMENT / PLAN:  (M72.0) Dupuytren's contracture of both hands    Comment: He has discussed this with orthopedics as well  Plan: Continued use braces, diclofenac gel in case of pain    (F90.0) Attention deficit hyperactivity disorder (ADHD), predominantly inattentive type  Comment: treated  Plan: methylphenidate (RITALIN) 10 MG tablet,         DISCONTINUED: methylphenidate (RITALIN) 10 MG         tablet, DISCONTINUED: methylphenidate (RITALIN)        10 MG tablet         (Z79.899) Controlled " substance agreement signed  Comment: anew  Plan: Follow up q 3 months      (M18.11,  M18.12) Osteoarthritis of thumbs, bilateral  Comment: Excellent response to recommended therapies  Plan:           Leon Moss MD      The information in this document, created by the medical scribe for me, accurately reflects the services I personally performed and the decisions made by me. I have reviewed and approved this document for accuracy prior to leaving the patient care area.  August 14, 2018 11:03 AM    Leon Moss MD  [facility]

## 2018-11-13 ENCOUNTER — OFFICE VISIT (OUTPATIENT)
Dept: FAMILY MEDICINE | Facility: CLINIC | Age: 58
End: 2018-11-13
Payer: COMMERCIAL

## 2018-11-13 VITALS
SYSTOLIC BLOOD PRESSURE: 149 MMHG | DIASTOLIC BLOOD PRESSURE: 93 MMHG | HEIGHT: 71 IN | RESPIRATION RATE: 16 BRPM | WEIGHT: 170 LBS | TEMPERATURE: 97.5 F | HEART RATE: 65 BPM | BODY MASS INDEX: 23.8 KG/M2

## 2018-11-13 DIAGNOSIS — R03.0 ELEVATED BLOOD PRESSURE READING WITHOUT DIAGNOSIS OF HYPERTENSION: ICD-10-CM

## 2018-11-13 DIAGNOSIS — F90.0 ATTENTION DEFICIT HYPERACTIVITY DISORDER (ADHD), PREDOMINANTLY INATTENTIVE TYPE: Primary | ICD-10-CM

## 2018-11-13 DIAGNOSIS — Z23 NEED FOR PROPHYLACTIC VACCINATION AND INOCULATION AGAINST INFLUENZA: ICD-10-CM

## 2018-11-13 DIAGNOSIS — Z79.899 CONTROLLED SUBSTANCE AGREEMENT SIGNED: ICD-10-CM

## 2018-11-13 PROCEDURE — 99214 OFFICE O/P EST MOD 30 MIN: CPT | Mod: 25 | Performed by: FAMILY MEDICINE

## 2018-11-13 PROCEDURE — 90682 RIV4 VACC RECOMBINANT DNA IM: CPT | Performed by: FAMILY MEDICINE

## 2018-11-13 PROCEDURE — 90471 IMMUNIZATION ADMIN: CPT | Performed by: FAMILY MEDICINE

## 2018-11-13 RX ORDER — METHYLPHENIDATE HYDROCHLORIDE 10 MG/1
TABLET ORAL
Qty: 150 TABLET | Refills: 0 | Status: SHIPPED | OUTPATIENT
Start: 2019-01-12 | End: 2019-02-12

## 2018-11-13 RX ORDER — METHYLPHENIDATE HYDROCHLORIDE 10 MG/1
TABLET ORAL
Qty: 150 TABLET | Refills: 0 | Status: SHIPPED | OUTPATIENT
Start: 2018-12-13 | End: 2018-11-13

## 2018-11-13 RX ORDER — METHYLPHENIDATE HYDROCHLORIDE 10 MG/1
TABLET ORAL
Qty: 150 TABLET | Refills: 0 | Status: SHIPPED | OUTPATIENT
Start: 2018-11-13 | End: 2018-11-13

## 2018-11-13 NOTE — PROGRESS NOTES
SUBJECTIVE:   Parveen Flor is a 58 year old male who presents to clinic today for the following health issues:    Medication Followup of Ritalin     Taking Medication as prescribed: yes    Side Effects:  None    Medication Helping Symptoms:  yes     Elevated blood pressure reading without diagnosis of hypertension - Patient reports that his BP is high today because he was running late, felt anxious.   BP Readings from Last 6 Encounters:   11/13/18 (!) 149/93   08/14/18 138/88   05/29/18 100/60   05/01/18 135/83   04/02/18 130/76   03/14/18 152/88       Problem list and histories reviewed & adjusted, as indicated.  Additional history: none    Past Medical History:   Diagnosis Date     Attention deficit disorder, unspecified hyperactivity presence 3/1/2018     Bilateral low back pain without sciatica, unspecified chronicity 3/1/2018     Chemical dependency (H)     Sober x 7 years.     Controlled substance agreement signed 3/1/2018     Dupuytren's contracture of both hands 3/1/2018     Elevated blood pressure reading without diagnosis of hypertension 11/13/2018     Elevated glucose 5/3/2018     Hyperlipidemia LDL goal <160 5/3/2018    The 10-year ASCVD risk score ([place] ELIAS Jr, et al., 2013) is: 10.9%   Values used to calculate the score:     Age: 57 years     Sex: Male     Is Non- : No     Diabetic: No     Tobacco smoker: No     Systolic Blood Pressure: 135 mmHg     Is BP treated: No     HDL Cholesterol: 45 mg/dL     Total Cholesterol: 234 mg/dL      Leukoplakia of tongue 3/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 No pertinent surgical history.    Family History   Problem Relation Age of Onset     HEART DISEASE Paternal Grandmother      Arthritis Paternal Grandmother      Alcohol/Drug Maternal Grandfather  "     Cancer Maternal Grandmother      Liver       Social History   Substance Use Topics     Smoking status: Former Smoker     Packs/day: 0.50     Years: 4.00     Types: Cigarettes     Quit date: 11/2017     Smokeless tobacco: Never Used     Alcohol use No         Reviewed and updated as needed this visit by clinical staff  Tobacco  Allergies  Meds       Reviewed and updated as needed this visit by Provider         ROS:  CONSTITUTIONAL: NEGATIVE for fever, chills, change in weight  Excellent response to custom thumb braces.  No cardiac symptoms no insomnia    This document serves as a record of the services and decisions personally performed and made by Leon Moss MD. It was created on his behalf by Efrain Terrell, a trained medical scribe. The creation of this document is based on the provider's statements to the medical scribe.  Efrain Terrell November 13, 2018 2:30 PM    OBJECTIVE:     BP (!) 149/93 (BP Location: Right arm, Patient Position: Chair, Cuff Size: Adult Large)  Pulse 65  Temp 97.5  F (36.4  C) (Oral)  Resp 16  Ht 1.803 m (5' 11\")  Wt 77.1 kg (170 lb)  BMI 23.71 kg/m2  Body mass index is 23.71 kg/(m^2).  GENERAL: healthy, alert   PSYCH: mentation appears normal, affect normal/bright    Diagnostic Test Results:  No results found for this or any previous visit (from the past 24 hour(s)).    ASSESSMENT/PLAN:   (F90.0) Attention deficit hyperactivity disorder (ADHD), predominantly inattentive type  (primary encounter diagnosis)  Comment: refilled  Plan: methylphenidate (RITALIN) 10 MG tablet,         DISCONTINUED: methylphenidate (RITALIN) 10 MG         tablet, DISCONTINUED: methylphenidate (RITALIN)        10 MG tablet         (Z79.899) Controlled substance agreement signed  Comment: anew    (R03.0) Elevated blood pressure reading without diagnosis of hypertension  Comment: Usually fine  BP Readings from Last 6 Encounters:   11/13/18 (!) 149/93   08/14/18 138/88   05/29/18 100/60   05/01/18 135/83 "   04/02/18 130/76   03/14/18 152/88   Monitor    (Z23) Need for prophylactic vaccination and inoculation against influenza  Comment: administered   Plan: FLU VACCINE, (RIV4) RECOMBINANT HA  , IM         (FluBlok, egg free) [29012]- >18 YRS ([facility]         recommended  50-64 YRS), Vaccine         Administration, Initial [19795]          Vaccination: Advised to get New Shingles Vaccination @ pharmacy      The information in this document, created by the medical scribe for me, accurately reflects the services I personally performed and the decisions made by me. I have reviewed and approved this document for accuracy prior to leaving the patient care area.  November 13, 2018 1:25 PM    Leon Moss MD  [facility]

## 2018-11-13 NOTE — PROGRESS NOTES

## 2018-11-13 NOTE — MR AVS SNAPSHOT
"              After Visit Summary   11/13/2018    Parveen Flor    MRN: [id number]           Patient Information     Date Of Birth          1960        Visit Information        Provider Department      11/13/2018 1:15 PM Leon Moss MD [facility]        Today's Diagnoses     Attention deficit hyperactivity disorder (ADHD), predominantly inattentive type    -  1    Controlled substance agreement signed        Elevated blood pressure reading without diagnosis of hypertension        Need for prophylactic vaccination and inoculation against influenza           Follow-ups after your visit        Follow-up notes from your care team     Return in about 3 months (around 2/13/2019) for flu shot, go.      Who to contact     If you have questions or need follow up information about today's clinic visit or your schedule please contact [facility] directly at [phone].  Normal or non-critical lab and imaging results will be communicated to you by MyChart, letter or phone within 4 business days after the clinic has received the results. If you do not hear from us within 7 days, please contact the clinic through MyChart or phone. If you have a critical or abnormal lab result, we will notify you by phone as soon as possible.  Submit refill requests through [employer] or call your pharmacy and they will forward the refill request to us. Please allow 3 business days for your refill to be completed.          Additional Information About Your Visit        MyChart Information     [employer] lets you send messages to your doctor, view your test results, renew your prescriptions, schedule appointments and more. To sign up, go to [email or web address] . Click on \"Log in\" on the left side of the screen, which will take you to the Welcome page. Then click on \"Sign up Now\" on the right side of the page.     You will be asked to enter the access code listed below, as well as some personal " "information. Please follow the directions to create your username and password.     Your access code is: [email or web address]  Expires: 2019  9:28 AM     Your access code will  in 90 days. If you need help or a new code, please call your [facility] clinic or [phone].        Care EveryWhere ID     This is your Care EveryWhere ID. This could be used by other organizations to access your [facility] medical records  [id number]        Your Vitals Were     Pulse Temperature Respirations Height BMI (Body Mass Index)       65 97.5  F (36.4  C) (Oral) 16 5' 11\" (1.803 m) 23.71 kg/m2        Blood Pressure from Last 3 Encounters:   18 (!) 149/93   18 138/88   18 100/60    Weight from Last 3 Encounters:   18 170 lb (77.1 kg)   18 164 lb (74.4 kg)   18 162 lb (73.5 kg)              We Performed the Following     FLU VACCINE, (RIV4) RECOMBINANT HA  , IM (FluBlok, egg free) [57470]- >18 YRS ([facility] recommended  50-64 YRS)     Vaccine Administration, Initial [50057]          Today's Medication Changes          These changes are accurate as of 18 11:59 PM.  If you have any questions, ask your nurse or doctor.               Start taking these medicines.        Dose/Directions    methylphenidate 10 MG tablet   Commonly known as:  RITALIN   Used for:  Attention deficit hyperactivity disorder (ADHD), predominantly inattentive type   Started by:  Leon Moss MD        Start taking on:  2019   2 am, 2 mid day, 1 at hs   Quantity:  150 tablet   Refills:  0            Where to get your medicines      Some of these will need a paper prescription and others can be bought over the counter.  Ask your nurse if you have questions.     Bring a paper prescription for each of these medications     methylphenidate 10 MG tablet                Primary Care Provider Office Phone # Fax #    Leon Moss [phone] [address] [facility] [address]        Equal Access to " Services     [facility]: Hadii nancy Cano, waaxda luqadaha, qaybta kaalmakenneth hickey, sheela hewitt. So [facility] [phone].    ATENCIÓN: Si habla simona, tiene a rogres disposición servicios gratuitos de asistencia lingüística. Llame al [phone].    We comply with applicable federal civil rights laws and [place] laws. We do not discriminate on the basis of race, color, national origin, age, disability, sex, sexual orientation, or gender identity.            Thank you!     Thank you for choosing [facility]  for your care. Our goal is always to provide you with excellent care. Hearing back from our patients is one way we can continue to improve our services. Please take a few minutes to complete the written survey that you may receive in the mail after your visit with us. Thank you!             Your Updated Medication List - Protect others around you: Learn how to safely use, store and throw away your medicines at www.disposemymeds.org.          This list is accurate as of 11/13/18 11:59 PM.  Always use your most recent med list.                   Brand Name Dispense Instructions for use Diagnosis    atorvastatin 40 MG tablet    LIPITOR    90 tablet    Take 1 tablet (40 mg) by mouth daily    Hyperlipidemia LDL goal <160       diclofenac 1 % Gel topical gel    VOLTAREN    100 g    Place onto the skin 4 times daily    Osteoarthritis of thumbs, bilateral       IBUPROFEN PO           methylphenidate 10 MG tablet   Start taking on:  1/12/2019    RITALIN    150 tablet    2 am, 2 mid day, 1 at hs    Attention deficit hyperactivity disorder (ADHD), predominantly inattentive type

## 2019-02-12 ENCOUNTER — OFFICE VISIT (OUTPATIENT)
Dept: FAMILY MEDICINE | Facility: CLINIC | Age: 59
End: 2019-02-12
Payer: COMMERCIAL

## 2019-02-12 VITALS
OXYGEN SATURATION: 97 % | DIASTOLIC BLOOD PRESSURE: 84 MMHG | BODY MASS INDEX: 24.13 KG/M2 | TEMPERATURE: 97.9 F | SYSTOLIC BLOOD PRESSURE: 132 MMHG | HEART RATE: 79 BPM | RESPIRATION RATE: 16 BRPM | WEIGHT: 173 LBS

## 2019-02-12 DIAGNOSIS — F90.0 ATTENTION DEFICIT HYPERACTIVITY DISORDER (ADHD), PREDOMINANTLY INATTENTIVE TYPE: ICD-10-CM

## 2019-02-12 DIAGNOSIS — Z79.899 CONTROLLED SUBSTANCE AGREEMENT SIGNED: Primary | ICD-10-CM

## 2019-02-12 DIAGNOSIS — M18.0 OSTEOARTHRITIS OF THUMBS, BILATERAL: ICD-10-CM

## 2019-02-12 DIAGNOSIS — M72.0 DUPUYTREN'S CONTRACTURE OF BOTH HANDS: ICD-10-CM

## 2019-02-12 PROCEDURE — 80307 DRUG TEST PRSMV CHEM ANLYZR: CPT | Mod: 90 | Performed by: FAMILY MEDICINE

## 2019-02-12 PROCEDURE — 99214 OFFICE O/P EST MOD 30 MIN: CPT | Performed by: FAMILY MEDICINE

## 2019-02-12 PROCEDURE — 99000 SPECIMEN HANDLING OFFICE-LAB: CPT | Performed by: FAMILY MEDICINE

## 2019-02-12 RX ORDER — METHYLPHENIDATE HYDROCHLORIDE 10 MG/1
TABLET ORAL
Qty: 150 TABLET | Refills: 0 | Status: SHIPPED | OUTPATIENT
Start: 2019-04-13 | End: 2019-05-09

## 2019-02-12 RX ORDER — METHYLPHENIDATE HYDROCHLORIDE 10 MG/1
TABLET ORAL
Qty: 150 TABLET | Refills: 0 | Status: SHIPPED | OUTPATIENT
Start: 2019-03-14 | End: 2019-02-12

## 2019-02-12 RX ORDER — METHYLPHENIDATE HYDROCHLORIDE 10 MG/1
TABLET ORAL
Qty: 150 TABLET | Refills: 0 | Status: SHIPPED | OUTPATIENT
Start: 2019-02-12 | End: 2019-02-12

## 2019-02-12 NOTE — PROGRESS NOTES
SUBJECTIVE:   Parveen Flor is a 58 year old male who presents to clinic today for the following health issues:      Medication Followup of Ritalin and Voltaren Gel    Taking Medication as prescribed: yes    Side Effects: Voltaren gel may cause try skin, but this is tolerable     Medication Helping Symptoms:  yes     Controlled substance agreement signed  (primary encounter diagnosis) on file  Osteoarthritis of thumbs, bilateral splints wearing out. Hard use  Dupuytren's contracture of both hands a brother had unsuccessful surgery  Attention deficit hyperactivity disorder (ADHD), predominantly inattentive type refills. Meds effective      Problem list and histories reviewed & adjusted, as indicated.  Additional history: as documented    PAST MEDICAL HISTORY:   Past Medical History:   Diagnosis Date     Attention deficit disorder, unspecified hyperactivity presence 3/1/2018     Bilateral low back pain without sciatica, unspecified chronicity 3/1/2018     Chemical dependency (H)     Sober x 7 years.     Controlled substance agreement signed 3/1/2018     Dupuytren's contracture of both hands 3/1/2018     Elevated blood pressure reading without diagnosis of hypertension 11/13/2018     Elevated glucose 5/3/2018     Hyperlipidemia LDL goal <160 5/3/2018    The 10-year ASCVD risk score ([place] ELIAS Jr, et al., 2013) is: 10.9%   Values used to calculate the score:     Age: 57 years     Sex: Male     Is Non- : No     Diabetic: No     Tobacco smoker: No     Systolic Blood Pressure: 135 mmHg     Is BP treated: No     HDL Cholesterol: 45 mg/dL     Total Cholesterol: 234 mg/dL      Leukoplakia of tongue 3/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 No pertinent surgical history.    FAMILY HISTORY:    Family History   Problem Relation Age of Onset     Heart Disease Paternal Grandmother      Arthritis Paternal Grandmother      Alcohol/Drug Maternal Grandfather      Cancer Maternal Grandmother         Liver       SOCIAL HISTORY:   Social History     Tobacco Use     Smoking status: Former Smoker     Packs/day: 0.50     Years: 4.00     Pack years: 2.00     Types: Cigarettes     Last attempt to quit: 2017     Years since quittin.2     Smokeless tobacco: Never Used   Substance Use Topics     Alcohol use: No     Professional drummer    Reviewed and updated as needed this visit by clinical staff  Tobacco  Allergies  Meds  Med Hx  Surg Hx  Fam Hx  Soc Hx      Reviewed and updated as needed this visit by Provider         ROS:  Constitutional: positive for weight gain, 173 lbs.   Psychiatric: negative for insomnia    Ros negative except as otherwise noted.    The information in this document, created by the medical scribe for me, accurately reflects the services I personally performed and the decisions made by me. I have reviewed and approved this document for accuracy prior to leaving the patient care area.  2019 11:12 AM    OBJECTIVE:     /84 (BP Location: Right arm, Patient Position: Chair, Cuff Size: Adult Regular)   Pulse 79   Temp 97.9  F (36.6  C) (Oral)   Resp 16   Wt 78.5 kg (173 lb)   SpO2 97%   BMI 24.13 kg/m    Body mass index is 24.13 kg/m .  MS: stable Dupuytrenes, arthritic hand nodules  PSYCH: mentation appears normal, affect normal/bright    Diagnostic Test Results:  No results found for this or any previous visit (from the past 24 hour(s)).    ASSESSMENT/PLAN:   (M18.11,  M18.12) Osteoarthritis of thumbs, bilateral  (primary encounter diagnosis)  Comment: new braces, refill  Plan: [facility],         diclofenac (VOLTAREN) 1 % topical gel          (Z79.899) Controlled substance agreement signed  Comment: UTD  Plan: Drug  Screen  Comprehensive, Urine w/o Reported         Meds (Pain Care Package)          (M72.0) Dupuytren's contracture of both hands  Comment: he wishes to avoid surgery  Plan: [facility]          (F90.0) Attention deficit hyperactivity disorder (ADHD), predominantly inattentive type  Comment: refill  Plan: methylphenidate (RITALIN) 10 MG tablet,         DISCONTINUED: methylphenidate (RITALIN) 10 MG         tablet, DISCONTINUED: methylphenidate (RITALIN)        10 MG tablet            Vaccination - Flu shot received this year, needs 2nd shingles shot     The information in this document, created by the medical scribe for me, accurately reflects the services I personally performed and the decisions made by me. I have reviewed and approved this document for accuracy prior to leaving the patient care area.  February 12, 2019 11:08 AM      Leon Moss MD  [facility]

## 2019-02-16 LAB — COMPREHEN DRUG ANALYSIS UR: NORMAL

## 2019-02-25 ENCOUNTER — THERAPY VISIT (OUTPATIENT)
Dept: OCCUPATIONAL THERAPY | Facility: CLINIC | Age: 59
End: 2019-02-25
Payer: COMMERCIAL

## 2019-02-25 DIAGNOSIS — M79.641 PAIN IN BOTH HANDS: Primary | ICD-10-CM

## 2019-02-25 DIAGNOSIS — M79.642 PAIN IN BOTH HANDS: Primary | ICD-10-CM

## 2019-02-25 PROCEDURE — 97760 ORTHOTIC MGMT&TRAING 1ST ENC: CPT | Mod: GO | Performed by: OCCUPATIONAL THERAPIST

## 2019-02-25 NOTE — PROGRESS NOTES
Hand Therapy Initial Evaluation  Current Date:  2/25/2019    Subjective:  Parveen Flor is a 58 year old R hand dominant male.    Diagnosis: B hand pain  DOI:  ~1 year ago, exacerbated in January 2019    Patient reports symptoms of pain, stiffness/loss of motion and weakness/loss of strength of the B hands and thumbs which occurred due to gradual onset. Since onset symptoms are gradually getting worse. Special tests:  X-rays.  Previous treatment: B thumb spica orthoses. General health as reported by patient is good.  Pertinent medical history includes: Osteoarthritis, Pain at Night/Rest.  Medical allergies: none.  Surgical history: none.  Medication history: Ritalin.    Occupational Profile Information:  Current occupation is Unemployed, drummer  Currently working in normal job without restrictions  Job Tasks: Prolonged Sitting, Repetitive Tasks  Prior functional level:  no limitations  Barriers include:none  Mobility: No difficulty  Transportation: drives  Leisure activities/hobbies: Drumming, foosball, drawing    O:  Pain: better with splints, but continues with use.  No change per pt report.  Pt reports wearing previous custom hand based thumb spica orthoses most of the time to limit thumb use and pain.  Pt stated he is aware he will need surgery at some point for his thumbs.      AROM: per visual observation WNL    Appearance:  Date 2/25/2019 2/25/2019   Side R L   Shoulder deformity is present over the CMC + +   Volar subluxation present + +   Edema over the CMC joint - -   Noted collapse of MP into hyperextension during pinch + +     Strength: NT    Assessment/Plan:  Patient presents with symptoms consistent with diagnosis of B hand and thumb pain, with conservative intervention.     Patient's limitations or Problem List includes:  Pain, Decreased ROM/motion, Weakness, Decreased  and Decreased pinch of the bilateral hand and thumb which interferes with the patient's ability to perform Work Tasks and  Recreational Activities as compared to previous level of function.    Rehab Potential:  Good - Return to full activity, some limitations    Patient will benefit from skilled Occupational Therapy to increase overall strength, stability of wrist, stability of thumb and coordination to return to previous activity level and resume normal daily tasks and to reach their rehab potential.    Barriers to Learning:  No barrier    Communication Issues:  Patient appears to be able to clearly communicate and understand verbal and written communication and follow directions correctly.    Assessment of Occupational Performance:  1-3 Performance Deficits  Identified Performance Deficits: work and leisure activities      Clinical Decision Making (Complexity): Low complexity    Treatment Explanation:  The following has been discussed with the patient:  RX ordered/plan of care  Anticipated outcomes  Possible risks and side effects    P: Frequency/Duration:  1 X visit, pt to con't HEP independently, discharge [facility]    Treatment Plan:  Orthotic Fabrication:  Hand based orthosis  Discharge Plan:  Achieve all LTG.  Independent in home treatment program.  Reach maximal therapeutic benefit.    Home Exercise Program:  B hand-based neoprene splints with thermoplastic CMC support to be worn while drumming and per comfort

## 2019-02-27 ENCOUNTER — TRANSFERRED RECORDS (OUTPATIENT)
Dept: HEALTH INFORMATION MANAGEMENT | Facility: CLINIC | Age: 59
End: 2019-02-27

## 2019-05-09 ENCOUNTER — OFFICE VISIT (OUTPATIENT)
Dept: FAMILY MEDICINE | Facility: CLINIC | Age: 59
End: 2019-05-09
Payer: COMMERCIAL

## 2019-05-09 VITALS
BODY MASS INDEX: 24.36 KG/M2 | RESPIRATION RATE: 18 BRPM | DIASTOLIC BLOOD PRESSURE: 76 MMHG | HEART RATE: 79 BPM | HEIGHT: 71 IN | TEMPERATURE: 97.9 F | WEIGHT: 174 LBS | SYSTOLIC BLOOD PRESSURE: 119 MMHG

## 2019-05-09 DIAGNOSIS — F90.0 ATTENTION DEFICIT HYPERACTIVITY DISORDER (ADHD), PREDOMINANTLY INATTENTIVE TYPE: ICD-10-CM

## 2019-05-09 DIAGNOSIS — R73.09 ELEVATED GLUCOSE: ICD-10-CM

## 2019-05-09 DIAGNOSIS — R03.0 ELEVATED BLOOD PRESSURE READING WITHOUT DIAGNOSIS OF HYPERTENSION: ICD-10-CM

## 2019-05-09 DIAGNOSIS — M72.0 DUPUYTREN'S CONTRACTURE OF BOTH HANDS: ICD-10-CM

## 2019-05-09 DIAGNOSIS — M18.0 OSTEOARTHRITIS OF THUMBS, BILATERAL: Primary | ICD-10-CM

## 2019-05-09 DIAGNOSIS — E78.5 HYPERLIPIDEMIA LDL GOAL <160: ICD-10-CM

## 2019-05-09 LAB — HBA1C MFR BLD: 5.3 % (ref 0–5.6)

## 2019-05-09 PROCEDURE — 36415 COLL VENOUS BLD VENIPUNCTURE: CPT | Performed by: FAMILY MEDICINE

## 2019-05-09 PROCEDURE — 99214 OFFICE O/P EST MOD 30 MIN: CPT | Performed by: FAMILY MEDICINE

## 2019-05-09 PROCEDURE — 83036 HEMOGLOBIN GLYCOSYLATED A1C: CPT | Performed by: FAMILY MEDICINE

## 2019-05-09 PROCEDURE — 80061 LIPID PANEL: CPT | Performed by: FAMILY MEDICINE

## 2019-05-09 PROCEDURE — 80053 COMPREHEN METABOLIC PANEL: CPT | Performed by: FAMILY MEDICINE

## 2019-05-09 RX ORDER — METHYLPHENIDATE HYDROCHLORIDE 10 MG/1
TABLET ORAL
Qty: 150 TABLET | Refills: 0 | Status: SHIPPED | OUTPATIENT
Start: 2019-07-12 | End: 2019-09-05

## 2019-05-09 RX ORDER — METHYLPHENIDATE HYDROCHLORIDE 10 MG/1
TABLET ORAL
Qty: 150 TABLET | Refills: 0 | Status: SHIPPED | OUTPATIENT
Start: 2019-05-13 | End: 2019-05-09

## 2019-05-09 RX ORDER — METHYLPHENIDATE HYDROCHLORIDE 10 MG/1
TABLET ORAL
Qty: 150 TABLET | Refills: 0 | Status: SHIPPED | OUTPATIENT
Start: 2019-06-12 | End: 2019-08-09

## 2019-05-09 RX ORDER — ATORVASTATIN CALCIUM 40 MG/1
40 TABLET, FILM COATED ORAL DAILY
Qty: 90 TABLET | Refills: 3 | Status: SHIPPED | OUTPATIENT
Start: 2019-05-09 | End: 2020-04-08

## 2019-05-09 ASSESSMENT — MIFFLIN-ST. JEOR: SCORE: 1621.39

## 2019-05-09 NOTE — PROGRESS NOTES
SUBJECTIVE:   Parveen Flor is a 58 year old male who presents to clinic today for the following health issues:      Wrist pain is worsening since last visit      HPI      Osteoarthritis of thumbs, bilateral  (primary encounter diagnosis)- Patient is having recurrent wrist pain. He is wearing wrist splints, they are not helping.  He is doing physical therapy exercises.  Last month given softer thumb splints which he finds an effective    Hyperlipidemia LDL goal <160-treated  LDL Cholesterol Calculated   Date Value Ref Range Status   05/01/2018 166 (H) <100 mg/dL Final     Comment:     Above desirable:  100-129 mg/dl  Borderline High:  130-159 mg/dL  High:             160-189 mg/dL  Very high:       >189 mg/dl         Attention deficit hyperactivity disorder (ADHD), predominantly inattentive type-refills    Dupuytren's contracture of both hands- Hand pain flares when playing instruments.    Elevated blood pressure reading without diagnosis of hypertension-  BP Readings from Last 3 Encounters:   05/09/19 119/76   02/12/19 132/84   11/13/18 (!) 149/93       Elevated glucose-   Lab Results   Component Value Date    A1C 5.2 05/29/2018       Taking Excedrin for migraines.  Discussed rebound          Additional history: none    Reviewed and updated as needed this visit by clinical staff  Tobacco  Allergies  Meds  Med Hx  Surg Hx  Fam Hx  Soc Hx        Reviewed and updated as needed this visit by Provider       Past Medical History:   Diagnosis Date     Attention deficit disorder, unspecified hyperactivity presence 3/1/2018     Bilateral low back pain without sciatica, unspecified chronicity 3/1/2018     Chemical dependency (H)     Sober x 7 years.     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 No pertinent surgical history.    Family History   Problem Relation Age of Onset     Heart Disease Paternal Grandmother      Arthritis Paternal Grandmother      Alcohol/Drug Maternal Grandfather      Cancer Maternal Grandmother         Liver       Social History     Tobacco Use     Smoking status: Former Smoker     Packs/day: 0.50     Years: 4.00     Pack years: 2.00     Types: Cigarettes     Last attempt to quit: 2017     Years since quittin.5     Smokeless tobacco: Never Used   Substance Use Topics     Alcohol use: No         ROS:  As above    This document serves as a record of the services and decisions personally performed and made by Leon Moss MD. It was created on his behalf by Kb Alvarado, a trained medical scribe. The creation of this document is based on the provider's statements to the medical scribe.  Kb Alvarado May 9, 2019 10:37 AM    OBJECTIVE:     /76 (BP Location: Right arm, Patient Position: Chair, Cuff Size: Adult Large)   Pulse 79   Temp 97.9  F (36.6  C) (Oral)   Resp 18   Ht 1.803 m (5' 11\")   Wt 78.9 kg (174 lb)   BMI 24.27 kg/m    Body mass index is 24.27 kg/m .     MS: no gross musculoskeletal defects noted, no edema  Affect bright  Fingers right more than left medial no longer extend fully  Cranial nerves symmetric  Diagnostic Test Results:  Results for orders placed or performed in " visit on 05/09/19 (from the past 24 hour(s))   Hemoglobin A1c   Result Value Ref Range    Hemoglobin A1C 5.3 0 - 5.6 %       ASSESSMENT/PLAN:   (M18.11,  M18.12) Osteoarthritis of thumbs, bilateral  (primary encounter diagnosis)  Comment: Discussed at length.  He should resume using his custom made splints      (E78.5) Hyperlipidemia LDL goal <160  Comment: Treated, continue measure  Plan: Lipid panel reflex to direct LDL Non-fasting,         Comprehensive metabolic panel, atorvastatin         (LIPITOR) 40 MG tablet            (F90.0) Attention deficit hyperactivity disorder (ADHD), predominantly inattentive type  Comment: Refilled, continue  Plan: methylphenidate (RITALIN) 10 MG tablet,         methylphenidate (RITALIN) 10 MG tablet,         DISCONTINUED: methylphenidate (RITALIN) 10 MG         tablet            (M72.0) Dupuytren's contracture of both hands  Comment: Discussed options and surgery      (R03.0) Elevated blood pressure reading without diagnosis of hypertension  Comment not at the moment monitor      (R73.09) Elevated glucose  Comment: [place] database  Plan: Hemoglobin A1c              The information in this document, created by the medical scribe for me, accurately reflects the services I personally performed and the decisions made by me. I have reviewed and approved this document for accuracy prior to leaving the patient care area.  May 9, 2019 10:37 AM      Leon Moss MD  [facility]

## 2019-05-09 NOTE — LETTER
May 13, 2019      Parveen Flor  [address]  [place] [address]        Dear ,    We are writing to inform you of your test results.    Tests look good. No diabetes still.    Resulted Orders   Lipid panel reflex to direct LDL Non-fasting   Result Value Ref Range    Cholesterol 144 <200 mg/dL    Triglycerides 62 <150 mg/dL      Comment:      Non Fasting    HDL Cholesterol 54 >39 mg/dL    LDL Cholesterol Calculated 78 <100 mg/dL      Comment:      Desirable:       <100 mg/dl    Non HDL Cholesterol 90 <130 mg/dL   Hemoglobin A1c   Result Value Ref Range    Hemoglobin A1C 5.3 0 - 5.6 %      Comment:      Normal <5.7% Prediabetes 5.7-6.4%  Diabetes 6.5% or higher - adopted from ADA   consensus guidelines.     Comprehensive metabolic panel   Result Value Ref Range    Sodium 138 133 - 144 mmol/L    Potassium 4.5 3.4 - 5.3 mmol/L    Chloride 106 94 - 109 mmol/L    Carbon Dioxide 26 20 - 32 mmol/L    Anion Gap 6 3 - 14 mmol/L    Glucose 106 (H) 70 - 99 mg/dL      Comment:      Non Fasting    Urea Nitrogen 13 7 - 30 mg/dL    Creatinine 0.80 0.66 - 1.25 mg/dL    GFR Estimate >90 >60 mL/min/[1.73_m2]      Comment:      Non  GFR Calc  Starting 12/18/2018, serum creatinine based estimated GFR (eGFR) will be   calculated using the Chronic Kidney Disease Epidemiology Collaboration   (CKD-EPI) equation.      GFR Estimate If Black >90 >60 mL/min/[1.73_m2]      Comment:       GFR Calc  Starting 12/18/2018, serum creatinine based estimated GFR (eGFR) will be   calculated using the Chronic Kidney Disease Epidemiology Collaboration   (CKD-EPI) equation.      Calcium 9.1 8.5 - 10.1 mg/dL    Bilirubin Total 0.4 0.2 - 1.3 mg/dL    Albumin 4.1 3.4 - 5.0 g/dL    Protein Total 7.4 6.8 - 8.8 g/dL    Alkaline Phosphatase 78 40 - 150 U/L    ALT 30 0 - 70 U/L    AST 17 0 - 45 U/L       If you have any questions or concerns, please call the clinic at the number listed above.        Sincerely,        Leon Moss MD/AL

## 2019-05-10 LAB
ALBUMIN SERPL-MCNC: 4.1 G/DL (ref 3.4–5)
ALP SERPL-CCNC: 78 U/L (ref 40–150)
ALT SERPL W P-5'-P-CCNC: 30 U/L (ref 0–70)
ANION GAP SERPL CALCULATED.3IONS-SCNC: 6 MMOL/L (ref 3–14)
AST SERPL W P-5'-P-CCNC: 17 U/L (ref 0–45)
BILIRUB SERPL-MCNC: 0.4 MG/DL (ref 0.2–1.3)
BUN SERPL-MCNC: 13 MG/DL (ref 7–30)
CALCIUM SERPL-MCNC: 9.1 MG/DL (ref 8.5–10.1)
CHLORIDE SERPL-SCNC: 106 MMOL/L (ref 94–109)
CHOLEST SERPL-MCNC: 144 MG/DL
CO2 SERPL-SCNC: 26 MMOL/L (ref 20–32)
CREAT SERPL-MCNC: 0.8 MG/DL (ref 0.66–1.25)
GFR SERPL CREATININE-BSD FRML MDRD: >90 ML/MIN/{1.73_M2}
GLUCOSE SERPL-MCNC: 106 MG/DL (ref 70–99)
HDLC SERPL-MCNC: 54 MG/DL
LDLC SERPL CALC-MCNC: 78 MG/DL
NONHDLC SERPL-MCNC: 90 MG/DL
POTASSIUM SERPL-SCNC: 4.5 MMOL/L (ref 3.4–5.3)
PROT SERPL-MCNC: 7.4 G/DL (ref 6.8–8.8)
SODIUM SERPL-SCNC: 138 MMOL/L (ref 133–144)
TRIGL SERPL-MCNC: 62 MG/DL

## 2019-08-08 DIAGNOSIS — Z79.899 CONTROLLED SUBSTANCE AGREEMENT SIGNED: ICD-10-CM

## 2019-08-08 DIAGNOSIS — F90.0 ATTENTION DEFICIT HYPERACTIVITY DISORDER (ADHD), PREDOMINANTLY INATTENTIVE TYPE: ICD-10-CM

## 2019-08-08 NOTE — TELEPHONE ENCOUNTER
Controlled Substance Refill Request for Ritalin  Problem List Complete:    No     PROVIDER TO CONSIDER COMPLETION OF PROBLEM LIST AND OVERVIEW/CONTROLLED SUBSTANCE AGREEMENT    Last Written Prescription Date:  07/12/2019  Last Fill Quantity: 150,   # refills: 0    THE MOST RECENT OFFICE VISIT MUST BE WITHIN THE PAST 3 MONTHS. AT LEAST ONE FACE TO FACE VISIT MUST OCCUR EVERY 6 MONTHS. ADDITIONAL VISITS CAN BE VIRTUAL.  (THIS STATEMENT SHOULD BE DELETED.)    Last Office Visit with [facility] primary care provider: 05/09/2019    Future Office visit:   Next 5 appointments (look out 90 days)    Sep 05, 2019  2:05 PM CDT  Office visit with Leon Moss MD, [facility] ROOM [facility] [address] [place] [id number] [phone]          Controlled substance agreement:   Encounter-Level CSA - 03/01/2018:    Controlled Substance Agreement - Scan on 3/18/2018  9:04 AM: CONTROLLED SUBSTANCE AGREEMENT (below)       Patient-Level CSA:    There are no patient-level csa.         Last Urine Drug Screen: No results found for: Zander POZO Drug Analysis UR   Date Value Ref Range Status   02/12/2019 FINAL  Final     Comment:     (Note)  ====================================================================  COMPREHENSIVE DRUG ANALYSIS,UR  ====================================================================  Test                             Result       Flag       Units        Drug Present   Carboxy-THC                    453                     ng/mg creat    Carboxy-THC is a metabolite of tetrahydrocannabinol  (THC).    Source of THC is most commonly illicit, but THC is also present    in a scheduled prescription medication.   Ritalinic Acid                 PRESENT                                Ritalinic acid is an expected metabolite of methylphenidate.    Source of methylphenidate is a scheduled prescription  medication.  ====================================================================  Test                      Result    Flag   Units      Ref Range        Creatinine              74               mg/dL      >=20            ====================================================================  For clinical consultation, please call [phone].  ====================================================================  Analysis performed by [employer] [place] [address]     , No results found for: THC13, PCP13, COC13, MAMP13, OPI13, AMP13, BZO13, TCA13, MTD13, BAR13, OXY13, PPX13, BUP13     Processing:  Send electronically to pharmacy     [email or web address]       checked in past 3 months?  No, route to RN     Patient is scheduled for Sept 5th with Dr Moss.

## 2019-08-09 RX ORDER — METHYLPHENIDATE HYDROCHLORIDE 10 MG/1
TABLET ORAL
Qty: 150 TABLET | Refills: 0 | Status: SHIPPED | OUTPATIENT
Start: 2019-08-09 | End: 2019-09-05

## 2019-08-09 NOTE — TELEPHONE ENCOUNTER
updated.  No concerns noted.  3 month med check due.  Appt 9/5/19.  Not [facility] med.  Sent to provider.  Please advise.  Hanh Thompson RN

## 2019-09-05 ENCOUNTER — OFFICE VISIT (OUTPATIENT)
Dept: FAMILY MEDICINE | Facility: CLINIC | Age: 59
End: 2019-09-05
Payer: COMMERCIAL

## 2019-09-05 DIAGNOSIS — F90.0 ATTENTION DEFICIT HYPERACTIVITY DISORDER (ADHD), PREDOMINANTLY INATTENTIVE TYPE: ICD-10-CM

## 2019-09-05 DIAGNOSIS — R10.9 RIGHT FLANK PAIN: Primary | ICD-10-CM

## 2019-09-05 DIAGNOSIS — M54.50 BILATERAL LOW BACK PAIN WITHOUT SCIATICA, UNSPECIFIED CHRONICITY: ICD-10-CM

## 2019-09-05 PROBLEM — F98.8 ATTENTION DEFICIT DISORDER, UNSPECIFIED HYPERACTIVITY PRESENCE: Status: RESOLVED | Noted: 2018-03-01 | Resolved: 2019-09-05

## 2019-09-05 PROBLEM — R03.0 ELEVATED BLOOD PRESSURE READING WITHOUT DIAGNOSIS OF HYPERTENSION: Status: RESOLVED | Noted: 2018-11-13 | Resolved: 2019-09-05

## 2019-09-05 LAB
ALBUMIN UR-MCNC: NEGATIVE MG/DL
APPEARANCE UR: CLEAR
BILIRUB UR QL STRIP: NEGATIVE
COLOR UR AUTO: YELLOW
GLUCOSE UR STRIP-MCNC: NEGATIVE MG/DL
HGB UR QL STRIP: NEGATIVE
KETONES UR STRIP-MCNC: NEGATIVE MG/DL
LEUKOCYTE ESTERASE UR QL STRIP: NEGATIVE
NITRATE UR QL: NEGATIVE
PH UR STRIP: 7.5 PH (ref 5–7)
SOURCE: ABNORMAL
SP GR UR STRIP: 1.02 (ref 1–1.03)
UROBILINOGEN UR STRIP-ACNC: 0.2 EU/DL (ref 0.2–1)

## 2019-09-05 PROCEDURE — 99214 OFFICE O/P EST MOD 30 MIN: CPT | Performed by: FAMILY MEDICINE

## 2019-09-05 PROCEDURE — 81003 URINALYSIS AUTO W/O SCOPE: CPT | Performed by: FAMILY MEDICINE

## 2019-09-05 RX ORDER — METHYLPHENIDATE HYDROCHLORIDE 10 MG/1
TABLET ORAL
Qty: 150 TABLET | Refills: 0 | Status: SHIPPED | OUTPATIENT
Start: 2019-09-06 | End: 2019-10-07

## 2019-09-05 NOTE — PROGRESS NOTES
"Subjective     Parveen Flor is a 58 year old male who presents to clinic today for the following health issues:    Musculoskeletal Problem     History of Present Illness        He eats 0-1 servings of fruits and vegetables daily.He consumes 1 sweetened beverage(s) daily.  He is taking medications regularly.     Medication Followup of Ritalin     Taking Medication as prescribed: yes    Side Effects:  None    Medication Helping Symptoms:  yes         Right flank pain  (primary encounter diagnosis)- \"Tweaked back\" Lower left flank pain. Using single point cane, it is helpful.     Attention deficit hyperactivity disorder (ADHD), predominantly inattentive type- Patient is feeling well, \"better than I have ever felt.\" Ritalin is helpful. He talks about his disjointed mental processes before medication,  and shows hand written notes and drawings of how he tried to keep his thoughts in order. He feels more calm and structured now. Interested in counseling.    Bilateral low back pain without sciatica, unspecified chronicity- long standing          Past Medical History:   Diagnosis Date     Attention deficit disorder, unspecified hyperactivity presence 3/1/2018     Bilateral low back pain without sciatica, unspecified chronicity 3/1/2018     Chemical dependency (H)     Sober x 7 years.     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 No pertinent surgical history.    Family History   Problem Relation Age of Onset     Heart Disease Paternal Grandmother      Arthritis Paternal Grandmother      Alcohol/Drug Maternal Grandfather      Cancer Maternal Grandmother         Liver       Social History     Tobacco Use     Smoking status: Former Smoker     Packs/day: 0.50     Years: 4.00     Pack years: 2.00     Types: Cigarettes     Last attempt to quit: 2017     Years since quittin.8     Smokeless tobacco: Never Used   Substance Use Topics     Alcohol use: No       Reviewed and updated as needed this visit by Provider       Review of Systems   Anxiety improved. No dysuria, hematuria, rash, GI sx      This document serves as a record of the services and decisions personally performed and made by Leon Moss MD. It was created on his behalf by Kb Alvarado, a trained medical scribe. The creation of this document is based on the provider's statements to the medical scribe.  Kb Alvarado 2019 2:10 PM          Objective    There were no vitals taken for this visit.  There is no height or weight on file to calculate BMI.   There were no vitals taken for this visit.    Physical Exam   PSYCH: mentation appears normal, affect normal/bright  Neg SLR, cane with mild antalgia  Abd neg  Results for orders placed or performed in visit on 19   *UA reflex to Microscopic and Culture ([place] and [facility] (except [place] and [place])   Result Value Ref Range    Color Urine Yellow     Appearance Urine Clear     Glucose Urine Negative NEG^Negative mg/dL    Bilirubin Urine Negative NEG^Negative    Ketones Urine Negative NEG^Negative mg/dL    Specific Gravity Urine 1.020 1.003 - 1.035    Blood Urine Negative NEG^Negative    pH Urine 7.5  (H) 5.0 - 7.0 pH    Protein Albumin Urine Negative NEG^Negative mg/dL    Urobilinogen Urine 0.2 0.2 - 1.0 EU/dL    Nitrite Urine Negative NEG^Negative    Leukocyte Esterase Urine Negative NEG^Negative    Source Midstream Urine                Assessment & Plan   Problem List Items Addressed This Visit        Nervous and Auditory    Bilateral low back pain without sciatica, unspecified chronicity     Variable, waxes wanes         Right flank pain - Primary     MSK, no hematuria         Relevant Orders    *UA reflex to Microscopic and Culture ([place] and [facility] (except [place] and [place]) (Completed)       Behavioral    Attention deficit hyperactivity disorder (ADHD), predominantly inattentive type     Responsive, refill stimulants         Relevant Medications    methylphenidate (RITALIN) 10 MG tablet (Start on 9/6/2019)         Given his interest in counseling, we had [facility] interview the patient, who will arrange outpt counseling    Return in about 3 months (around 12/5/2019).    The information in this document, created by the medical scribe for me, accurately reflects the services I personally performed and the decisions made by me. I have reviewed and approved this document for accuracy prior to leaving the patient care area.  September 5, 2019 2:10 PM    Leon Moss MD  [facility]

## 2019-09-05 NOTE — PROGRESS NOTES
"Subjective     Parveen Flor is a 58 year old male who presents to clinic today for the following health issues:    HPI   Medication Followup of ***    Taking Medication as prescribed: {.:431215::\"yes\"}    Side Effects:  {NONEORCHOOSE:198594::\"None\"}    Medication Helping Symptoms:  {.:094119::\"yes\"}     {additonal problems for provider to add (Optional):201779}    {HIST REVIEW/ LINKS 2 (Optional):570537}    Reviewed and updated as needed this visit by Provider         Review of Systems   {ROS COMP (Optional):453534}      Objective    There were no vitals taken for this visit.  There is no height or weight on file to calculate BMI.  Physical Exam   {Exam List (Optional):040475}    {Diagnostic Test Results (Optional):588255::\"Diagnostic Test Results:\",\"Labs reviewed in Epic\"}        {PROVIDER CHARTING PREFERENCE:904710}    "

## 2019-10-07 DIAGNOSIS — F90.0 ATTENTION DEFICIT HYPERACTIVITY DISORDER (ADHD), PREDOMINANTLY INATTENTIVE TYPE: ICD-10-CM

## 2019-10-07 NOTE — TELEPHONE ENCOUNTER
Controlled Substance Refill Request for methylphenidate (RITALIN) 10 MG tablet  Problem List Complete:    No     PROVIDER TO CONSIDER COMPLETION OF PROBLEM LIST AND OVERVIEW/CONTROLLED SUBSTANCE AGREEMENT    Last Written Prescription Date:  9/6/2019  Last Fill Quantity: 150 tablet,   # refills: 0    THE MOST RECENT OFFICE VISIT MUST BE WITHIN THE PAST 3 MONTHS. AT LEAST ONE FACE TO FACE VISIT MUST OCCUR EVERY 6 MONTHS. ADDITIONAL VISITS CAN BE VIRTUAL.  (THIS STATEMENT SHOULD BE DELETED.)    Last Office Visit with [facility] primary care provider: 9/5/2019Ajay    Future Office visit:     Controlled substance agreement:   Encounter-Level CSA - 03/01/2018:    Controlled Substance Agreement - Scan on 3/18/2018  9:04 AM: CONTROLLED SUBSTANCE AGREEMENT     Patient-Level CSA:    There are no patient-level csa.         Last Urine Drug Screen: No results found for: Zander POZO Drug Analysis UR   Date Value Ref Range Status   02/12/2019 FINAL  Final     Comment:     (Note)  ====================================================================  COMPREHENSIVE DRUG ANALYSIS,UR  ====================================================================  Test                             Result       Flag       Units        Drug Present   Carboxy-THC                    453                     ng/mg creat    Carboxy-THC is a metabolite of tetrahydrocannabinol  (THC).    Source of THC is most commonly illicit, but THC is also present    in a scheduled prescription medication.   Ritalinic Acid                 PRESENT                                Ritalinic acid is an expected metabolite of methylphenidate.    Source of methylphenidate is a scheduled prescription medication.  ====================================================================  Test                      Result    Flag   Units      Ref Range        Creatinine              74               mg/dL      >=20             ====================================================================  For clinical consultation, please call [phone].  ====================================================================  Analysis performed by [employer] [place] [address]     , No results found for: THC13, PCP13, COC13, MAMP13, OPI13, AMP13, BZO13, TCA13, MTD13, BAR13, OXY13, PPX13, BUP13     Processing:  Staff will hand deliver Rx to on-site pharmacy     [email or web address]       checked in past 3 months?  Yes 8/9/2019     Last [place] website verification:  done on 8/9/19    [email or web address]

## 2019-10-08 NOTE — LETTER
June 8, 2018      Parveen Flor  [address]  [place] [address]        Dear Parveen,    I am a clinic care coordinator who works with your primary physician, Leon Moss, at the [facility]. I recently tried to call and was unable to reach you. I wanted to thank you for spending the time to talk with me.  I wanted to provide you with information on care coordination so that you can call me or your clinic care coordination team with questions or concerns about your health care. Below is a description of clinic care coordination and how I can further assist you.     The clinic care coordinator is a registered nurse and/or  who understand the health care system. The goal of clinic care coordination is to help you manage your health and improve access to the [facility] in the most efficient manner. The registered nurse can assist you in meeting your health care goals by providing education, coordinating services, and strengthening the communication among your providers. The  can assist you with financial, behavioral, psychosocial, chemical dependency, counseling, and/or psychiatric resources.    Please feel free to contact me at [phone], however my last day will be on July 6th, 2018. If you find you have a need after this date please call your clinic directly and they will connect you with a care coordinator that can assist you with any questions or concerns. We at [facility] are focused on providing you with the highest-quality healthcare experience possible and that all starts with you.     Sincerely,    CAIN Servin, MSW  Social Work Care Coordinator  P:[phone]  [facility], [facility] and [place]     67 yo female with thoracoabdominal aneurysm open repair complicated by cervical myelopathy, dysphagia, neurogenic bowel and bladder  seen at the bedside,  no n/v, no sob, still unable to void, getting IC.      Vital Signs Last 24 Hrs  T(C): 36.7 (08 Oct 2019 08:36), Max: 36.7 (07 Oct 2019 17:58)  T(F): 98.1 (08 Oct 2019 08:36), Max: 98.1 (08 Oct 2019 08:36)  HR: 77 (08 Oct 2019 08:36) (68 - 77)  BP: 127/67 (08 Oct 2019 08:36) (108/64 - 127/67)  BP(mean): --  RR: 14 (08 Oct 2019 08:36) (14 - 14)  SpO2: 99% (08 Oct 2019 08:36) (98% - 99%)      GENERAL- NAD  EAR/NOSE/MOUTH/THROAT - no pharyngeal exudates, no oral lesions  MMM  EYES- SIXTO, conjunctiva and Sclera clear  NECK- supple  RESPIRATORY-  clear to auscultation bilaterally  CARDIOVASCULAR - SIS2, RRR  GI - soft NT BS present  EXTREMITIES- no pedal edema  NEUROLOGY- right LE weakness  SKIN- no rashes, warm to touch  PSYCHIATRY- AAO X 3

## 2019-10-09 NOTE — TELEPHONE ENCOUNTER
Routing refill request to provider for review/approval because:  Drug not on the [facility] refill protocol   Last OV 9.5.19

## 2019-10-10 RX ORDER — METHYLPHENIDATE HYDROCHLORIDE 10 MG/1
TABLET ORAL
Qty: 150 TABLET | Refills: 0 | Status: SHIPPED | OUTPATIENT
Start: 2019-10-10 | End: 2019-11-08

## 2019-11-08 DIAGNOSIS — F90.0 ATTENTION DEFICIT HYPERACTIVITY DISORDER (ADHD), PREDOMINANTLY INATTENTIVE TYPE: ICD-10-CM

## 2019-11-08 RX ORDER — METHYLPHENIDATE HYDROCHLORIDE 10 MG/1
TABLET ORAL
Qty: 150 TABLET | Refills: 0 | Status: SHIPPED | OUTPATIENT
Start: 2019-11-08 | End: 2019-12-10

## 2019-11-08 NOTE — TELEPHONE ENCOUNTER
Last Written Prescription Date:  10.10.19  Last Fill Quantity: 150,  # refills: 0   Last office visit: 9/5/2019 with prescribing provider:  Ajay   Future Office Visit:      Requested Prescriptions   Pending Prescriptions Disp Refills     methylphenidate (RITALIN) 10 MG tablet [Pharmacy Med Name: METHYLPHENIDATE HCL 10MG TABS] 150 tablet 0     Sig: TAKE TWO TABLETS BY MOUTH EVERY MORNING, TWO TABLETS AT MIDDAY, AND TAKE ONE TABLET BY MOUTH EVERY NIGHT AT BEDTIME       There is no refill protocol information for this order        Routing refill request to provider for review/approval because:  Drug not on the [facility] refill protocol

## 2019-12-09 DIAGNOSIS — F90.0 ATTENTION DEFICIT HYPERACTIVITY DISORDER (ADHD), PREDOMINANTLY INATTENTIVE TYPE: ICD-10-CM

## 2019-12-09 DIAGNOSIS — Z79.899 CONTROLLED SUBSTANCE AGREEMENT SIGNED: ICD-10-CM

## 2019-12-09 NOTE — TELEPHONE ENCOUNTER
Patient walked in and needs refill on his Ritalin before his appointment in Jan. With Dr. Moss.. Pharmacy is [facility]. Please call when done.

## 2019-12-10 RX ORDER — METHYLPHENIDATE HYDROCHLORIDE 10 MG/1
TABLET ORAL
Qty: 150 TABLET | Refills: 0 | Status: SHIPPED | OUTPATIENT
Start: 2019-12-10 | End: 2020-01-09

## 2019-12-10 NOTE — TELEPHONE ENCOUNTER
Last OV 9/5/19.  Upcoming appt 1/9/20.  Last RF 11/8/19.  Updated .  No concerns noted.  Not [facility] med.  Sent to provider.  Please advise.  Hanh Thompson RN

## 2020-01-09 ENCOUNTER — OFFICE VISIT (OUTPATIENT)
Dept: FAMILY MEDICINE | Facility: CLINIC | Age: 60
End: 2020-01-09
Payer: MEDICARE

## 2020-01-09 VITALS
RESPIRATION RATE: 18 BRPM | DIASTOLIC BLOOD PRESSURE: 87 MMHG | BODY MASS INDEX: 23.07 KG/M2 | HEIGHT: 71 IN | TEMPERATURE: 98 F | HEART RATE: 77 BPM | WEIGHT: 164.8 LBS | OXYGEN SATURATION: 100 % | SYSTOLIC BLOOD PRESSURE: 137 MMHG

## 2020-01-09 DIAGNOSIS — F90.0 ATTENTION DEFICIT HYPERACTIVITY DISORDER (ADHD), PREDOMINANTLY INATTENTIVE TYPE: ICD-10-CM

## 2020-01-09 DIAGNOSIS — F81.9 LEARNING DISABILITY: ICD-10-CM

## 2020-01-09 PROCEDURE — 99213 OFFICE O/P EST LOW 20 MIN: CPT | Performed by: FAMILY MEDICINE

## 2020-01-09 RX ORDER — METHYLPHENIDATE HYDROCHLORIDE 10 MG/1
TABLET ORAL
Qty: 150 TABLET | Refills: 0 | Status: SHIPPED | OUTPATIENT
Start: 2020-01-09 | End: 2020-02-10

## 2020-01-09 ASSESSMENT — MIFFLIN-ST. JEOR: SCORE: 1574.66

## 2020-01-09 NOTE — PROGRESS NOTES
ADHD Follow-Up (Adult)  Concerns: just needs the medication, moved and he can not find them even so, he did not come in early requesting refills.  Instead he found himself getting hyper distracted and scattered  Changes since last visit: Stable  Taking controlled (daily) medications as prescribed: Yes Details: pt moved and is not able to find his medications for the last week or 2  Sleep: no problems when on medications  Currently in counseling: No    Medication Benefits:   Controlled symptoms: Hyperactivity - motor restlessness, Attention span, Distractability, Finishing tasks, Impulse control and Frustration tolerance  Uncontrolled symptoms:  None    Medication Side Effects:  Reports:  none  Sleep Problems? no  ++++++++++++++++++++++++++++++++++++++++++++++++      Subjective     Parveen Flor is a 59 year old male who presents to clinic today for the following health issues:    History of Present Illness        He eats 0-1 servings of fruits and vegetables daily.He consumes 0 sweetened beverage(s) daily.  He is taking medications regularly.         Attention deficit hyperactivity disorder (ADHD), predominantly inattentive type- Patient moved and misplaced his medication, he has been without Rx Ritalin for one week.          Past Medical History:   Diagnosis Date     Attention deficit disorder, unspecified hyperactivity presence 3/1/2018     Bilateral low back pain without sciatica, unspecified chronicity 3/1/2018     Chemical dependency (H)     Sober x 7 years.     Controlled substance agreement signed 3/1/2018     Dupuytren's contracture of both hands 3/1/2018     Elevated blood pressure reading without diagnosis of hypertension 11/13/2018     Elevated glucose 5/3/2018     Hyperlipidemia LDL goal <160 5/3/2018    The 10-year ASCVD risk score ([place] ELIAS Jr, et al., 2013) is: 10.9%   Values used to calculate the score:     Age: 57 years     Sex: Male     Is Non- : No     Diabetic: No     " Tobacco smoker: No     Systolic Blood Pressure: 135 mmHg     Is BP treated: No     HDL Cholesterol: 45 mg/dL     Total Cholesterol: 234 mg/dL      Learning disability 2020     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 No pertinent surgical history.    Family History   Problem Relation Age of Onset     Heart Disease Paternal Grandmother      Arthritis Paternal Grandmother      Alcohol/Drug Maternal Grandfather      Cancer Maternal Grandmother         Liver       Social History     Tobacco Use     Smoking status: Former Smoker     Packs/day: 0.50     Years: 4.00     Pack years: 2.00     Types: Cigarettes     Last attempt to quit: 2017     Years since quittin.1     Smokeless tobacco: Never Used   Substance Use Topics     Alcohol use: No       Reviewed and updated as needed this visit by Provider       Review of Systems   CONSTITUTIONAL: NEGATIVE for fever, chills  RESP: NEGATIVE for significant cough or SOB  CV: NEGATIVE for chest pain, palpitations or peripheral edema  GI: NEGATIVE for nausea, abdominal pain, heartburn, or change in bowel habits      This document serves as a record of the services and decisions personally performed and made by Leon Moss MD. It was created on his behalf by Kb Alvarado, a trained medical scribe. The creation of this document is based on the provider's statements to the medical scribe.  Kb Alvarado 2020 1:52 PM          Objective    /87 (BP Location: Right arm, Patient Position: Chair, Cuff Size: Adult Large)   Pulse 77   Temp 98  F (36.7  C) (Oral)   Resp 18   Ht 1.803 m (5' 11\")   Wt 74.8 kg (164 lb 12.8 oz)   SpO2 100%   BMI 22.98 kg/m    Body mass index is 22.98 kg/m .     Physical Exam   PSYCH: bright affect, tangential " hypomanic      Diagnostic Test Results:  Labs reviewed in Epic  No results found for this or any previous visit (from the past 24 hour(s)).        Assessment & Plan   Problem List Items Addressed This Visit        Behavioral    Attention deficit hyperactivity disorder (ADHD), predominantly inattentive type    Relevant Medications    methylphenidate (RITALIN) 10 MG tablet    Learning disability      Refills medicines.  Discussed the benefits that he demonstrates when his problems are treated.      Return in about 3 months (around 4/9/2020) for recheck.    The information in this document, created by the medical scribe for me, accurately reflects the services I personally performed and the decisions made by me. I have reviewed and approved this document for accuracy prior to leaving the patient care area.  January 9, 2020 1:52 PM    Leon Moss MD  [facility]

## 2020-02-10 DIAGNOSIS — F90.0 ATTENTION DEFICIT HYPERACTIVITY DISORDER (ADHD), PREDOMINANTLY INATTENTIVE TYPE: ICD-10-CM

## 2020-02-10 RX ORDER — METHYLPHENIDATE HYDROCHLORIDE 10 MG/1
TABLET ORAL
Qty: 150 TABLET | Refills: 0 | Status: SHIPPED | OUTPATIENT
Start: 2020-02-10 | End: 2020-03-09

## 2020-02-10 NOTE — TELEPHONE ENCOUNTER
Last Written Prescription Date:  1.9.2020  Last Fill Quantity: 150,  # refills: 0   Last office visit: 1/9/2020 with prescribing provider:  Ajay   Future Office Visit:      Routing refill request to provider for review/approval because:  Drug not on the [facility] refill protocol

## 2020-03-05 DIAGNOSIS — F90.0 ATTENTION DEFICIT HYPERACTIVITY DISORDER (ADHD), PREDOMINANTLY INATTENTIVE TYPE: ICD-10-CM

## 2020-03-05 NOTE — TELEPHONE ENCOUNTER
Last Written Prescription Date:  2.10.2020  Last Fill Quantity: 150,  # refills: 0   Last office visit: 1/9/2020 with prescribing provider:  Ajay   Future Office Visit:      Requested Prescriptions   Pending Prescriptions Disp Refills    methylphenidate (RITALIN) 10 MG tablet [Pharmacy Med Name: METHYLPHENIDATE HCL 10MG TABS] 150 tablet 0     Sig: TAKE TWO TABLETS BY MOUTH EVERY MORNING; TAKE TWO TABLETS AT MIDDAY AND TAKE ONE TABLET EVERY NIGHT AT BEDTIME       There is no refill protocol information for this order        Routing refill request to provider for review/approval because:  Drug not on the [facility] refill protocol

## 2020-03-09 RX ORDER — METHYLPHENIDATE HYDROCHLORIDE 10 MG/1
TABLET ORAL
Qty: 150 TABLET | Refills: 0 | Status: SHIPPED | OUTPATIENT
Start: 2020-03-13 | End: 2020-04-03

## 2020-03-09 NOTE — TELEPHONE ENCOUNTER
Last filled 2/12/20  Qty 150 for 30 days  Due 3/13, ok to fill per fv 2 days policy 2/11  Yuko Peña, Pharmacy [facility]  [phone]

## 2020-04-03 DIAGNOSIS — F90.0 ATTENTION DEFICIT HYPERACTIVITY DISORDER (ADHD), PREDOMINANTLY INATTENTIVE TYPE: ICD-10-CM

## 2020-04-03 RX ORDER — METHYLPHENIDATE HYDROCHLORIDE 20 MG/1
TABLET ORAL
Qty: 75 TABLET | Refills: 0 | Status: SHIPPED | OUTPATIENT
Start: 2020-04-13 | End: 2020-04-08

## 2020-04-03 NOTE — TELEPHONE ENCOUNTER
Not sure why this cam to me as I have never seen him.   Last refill was 3/13 so this one was made for 4/13

## 2020-04-03 NOTE — TELEPHONE ENCOUNTER
PCP and prescribing provider is Dr. Moss who was out of office 3/13/20 refill. Dr. Munoz refilled in his absence so her name is on last Rx.       TC, patient is due for 3 month ADHD med check with Dr. Moss around 4/9/20. Please call to schedule phone visit. Thanks.   Fito Alonzo, RN

## 2020-04-03 NOTE — LETTER
[facility]  [address] [place], [place], [address]  [phone]        April 7, 2020    Parveen Flor                                                                                                                                                       [id number]  [place] [address]            Dear Parveen,    Your medication has been filled.  A phone/virtual visit is due to discuss your health.  We did try several times unsuccessfully to reach you by phone.    Please make a phone visit by calling [phone].  If you have already made an appointment please disregard this letter.        [facility]

## 2020-04-08 ENCOUNTER — VIRTUAL VISIT (OUTPATIENT)
Dept: FAMILY MEDICINE | Facility: CLINIC | Age: 60
End: 2020-04-08
Payer: MEDICARE

## 2020-04-08 DIAGNOSIS — R73.09 ELEVATED GLUCOSE: ICD-10-CM

## 2020-04-08 DIAGNOSIS — Z79.899 CONTROLLED SUBSTANCE AGREEMENT SIGNED: ICD-10-CM

## 2020-04-08 DIAGNOSIS — E78.5 HYPERLIPIDEMIA LDL GOAL <160: ICD-10-CM

## 2020-04-08 DIAGNOSIS — F90.0 ATTENTION DEFICIT HYPERACTIVITY DISORDER (ADHD), PREDOMINANTLY INATTENTIVE TYPE: Primary | ICD-10-CM

## 2020-04-08 PROCEDURE — 99442 ZZC PHYSICIAN TELEPHONE EVALUATION 11-20 MIN: CPT | Performed by: FAMILY MEDICINE

## 2020-04-08 RX ORDER — METHYLPHENIDATE HYDROCHLORIDE 20 MG/1
TABLET ORAL
Qty: 75 TABLET | Refills: 0 | Status: SHIPPED | OUTPATIENT
Start: 2020-05-09 | End: 2020-08-03

## 2020-04-08 RX ORDER — METHYLPHENIDATE HYDROCHLORIDE 20 MG/1
TABLET ORAL
Qty: 75 TABLET | Refills: 0 | Status: SHIPPED | OUTPATIENT
Start: 2020-06-09 | End: 2020-07-08

## 2020-04-08 RX ORDER — ATORVASTATIN CALCIUM 40 MG/1
40 TABLET, FILM COATED ORAL DAILY
Qty: 90 TABLET | Refills: 3 | Status: SHIPPED | OUTPATIENT
Start: 2020-04-08 | End: 2020-12-28

## 2020-04-08 RX ORDER — METHYLPHENIDATE HYDROCHLORIDE 20 MG/1
TABLET ORAL
Qty: 75 TABLET | Refills: 0 | Status: CANCELLED | OUTPATIENT
Start: 2020-04-13

## 2020-04-08 RX ORDER — METHYLPHENIDATE HYDROCHLORIDE 20 MG/1
TABLET ORAL
Qty: 75 TABLET | Refills: 0 | Status: SHIPPED | OUTPATIENT
Start: 2020-04-08 | End: 2020-08-03

## 2020-04-08 NOTE — ASSESSMENT & PLAN NOTE
Periodic A1c.   Hemoglobin A1C   Date Value Ref Range Status   05/09/2019 5.3 0 - 5.6 % Final     Comment:     Normal <5.7% Prediabetes 5.7-6.4%  Diabetes 6.5% or higher - adopted from ADA   consensus guidelines.     Not prediabetes

## 2020-04-08 NOTE — PROGRESS NOTES
"Subjective     Parveen Flor is a 59 year old male who is being evaluated via a billable telephone visit.      The patient has been notified of following:     \"This telephone visit will be conducted via a call between you and your physician/provider. We have found that certain health care needs can be provided without the need for a physical exam.  This service lets us provide the care you need with a short phone conversation.  If a prescription is necessary we can send it directly to your pharmacy.  If lab work is needed we can place an order for that and you can then stop by our lab to have the test done at a later time.    Telephone visits are billed at different rates depending on your insurance coverage. During this emergency period, for some insurers they may be billed the same as an in-person visit.  Please reach out to your insurance provider with any questions.    If during the course of the call the physician/provider feels a telephone visit is not appropriate, you will not be charged for this service.\"    Patient has given verbal consent for Telephone visit?  Yes    Parveen Flor complains of   Chief Complaint   Patient presents with     Recheck Medication     Refill Request       ALLERGIES  No known drug allergy      ADHD Follow-Up (Adult)  Concerns: none  Changes since last visit: Stable  Taking controlled (daily) medications as prescribed: Yes  Sleep: no problems  Adult ADHD Self-Reporting form given to patient?:  No  Currently in counseling: No    Medication Benefits:   Controlled symptoms: Hyperactivity - motor restlessness, Frustration tolerance and Peer relations  Uncontrolled symptoms:  None    Medication Side Effects:  Reports:  none  Sleep Problems? no  ++++++++++++++++++++++++++++++++++++++++++++++++    Employer Concerns/Feedback:   Coworker Concerns:     Home/Family Concerns:     Patient calls for his routine visit regarding his ADD.  He cannot log enough the benefits of stable " ongoing therapy in his relationships his concentration and his sense of wellbeing.    He is also on statin therapy.  He has no concerns  Past Medical History:   Diagnosis Date     Attention deficit disorder, unspecified hyperactivity presence 3/1/2018     Bilateral low back pain without sciatica, unspecified chronicity 3/1/2018     Chemical dependency (H)     Sober x 7 years.     Controlled substance agreement signed 3/1/2018     Dupuytren's contracture of both hands 3/1/2018     Elevated blood pressure reading without diagnosis of hypertension 2018     Elevated glucose 5/3/2018     Hyperlipidemia LDL goal <160 5/3/2018    The 10-year ASCVD risk score ([place]ver[place] GRIFFIN Jr, et al., 2013) is: 10.9%   Values used to calculate the score:     Age: 57 years     Sex: Male     Is Non- : No     Diabetic: No     Tobacco smoker: No     Systolic Blood Pressure: 135 mmHg     Is BP treated: No     HDL Cholesterol: 45 mg/dL     Total Cholesterol: 234 mg/dL      Learning disability 2020     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 No pertinent surgical history.    Family History   Problem Relation Age of Onset     Heart Disease Paternal Grandmother      Arthritis Paternal Grandmother      Alcohol/Drug Maternal Grandfather      Cancer Maternal Grandmother         Liver       Social History     Tobacco Use     Smoking status: Former Smoker     Packs/day: 0.50     Years: 4.00     Pack years: 2.00     Types: Cigarettes     Last attempt to quit: 2017     Years since quittin.4     Smokeless tobacco: Never Used   Substance Use Topics     Alcohol use: No         Reviewed and updated as needed this visit by Provider         Review of Systems   No systemic symptoms.  Arthritic complaints are actually  improved with reduced musical playing       Objective             Assessment/Plan:  Problem List Items Addressed This Visit        Endocrine    Hyperlipidemia LDL goal <160     Statin therapy, tolerated.  Continue         Relevant Medications    atorvastatin (LIPITOR) 40 MG tablet    Other Relevant Orders    Comprehensive metabolic panel    Lipid panel reflex to direct LDL Non-fasting       Behavioral    Attention deficit hyperactivity disorder (ADHD), predominantly inattentive type - Primary     Therapy as tolerated effective.  Continue         Relevant Medications    methylphenidate (RITALIN) 20 MG tablet    methylphenidate (RITALIN) 20 MG tablet (Start on 5/9/2020)    methylphenidate (RITALIN) 20 MG tablet (Start on 6/9/2020)    Other Relevant Orders    Drug  Screen Comprehensive, Urine w/o Reported Meds (Pain Care Package)       Other    Controlled substance agreement signed     On file         Relevant Orders    Drug  Screen Comprehensive, Urine w/o Reported Meds (Pain Care Package)    Elevated glucose     Periodic A1c.   Hemoglobin A1C   Date Value Ref Range Status   05/09/2019 5.3 0 - 5.6 % Final     Comment:     Normal <5.7% Prediabetes 5.7-6.4%  Diabetes 6.5% or higher - adopted from ADA   consensus guidelines.     Not prediabetes           Relevant Orders    Hemoglobin A1c    Comprehensive metabolic panel            No follow-ups on file.      Phone call duration: 14 minutes    Leon Moss MD

## 2020-05-08 DIAGNOSIS — R73.09 ELEVATED GLUCOSE: ICD-10-CM

## 2020-05-08 DIAGNOSIS — E78.5 HYPERLIPIDEMIA LDL GOAL <160: ICD-10-CM

## 2020-05-08 DIAGNOSIS — Z79.899 CONTROLLED SUBSTANCE AGREEMENT SIGNED: ICD-10-CM

## 2020-05-08 DIAGNOSIS — F90.0 ATTENTION DEFICIT HYPERACTIVITY DISORDER (ADHD), PREDOMINANTLY INATTENTIVE TYPE: ICD-10-CM

## 2020-05-08 LAB
ALBUMIN SERPL-MCNC: 4 G/DL (ref 3.4–5)
ALP SERPL-CCNC: 79 U/L (ref 40–150)
ALT SERPL W P-5'-P-CCNC: 42 U/L (ref 0–70)
ANION GAP SERPL CALCULATED.3IONS-SCNC: 5 MMOL/L (ref 3–14)
AST SERPL W P-5'-P-CCNC: 19 U/L (ref 0–45)
BILIRUB SERPL-MCNC: 0.4 MG/DL (ref 0.2–1.3)
BUN SERPL-MCNC: 21 MG/DL (ref 7–30)
CALCIUM SERPL-MCNC: 8.8 MG/DL (ref 8.5–10.1)
CHLORIDE SERPL-SCNC: 104 MMOL/L (ref 94–109)
CHOLEST SERPL-MCNC: 164 MG/DL
CO2 SERPL-SCNC: 29 MMOL/L (ref 20–32)
CREAT SERPL-MCNC: 0.75 MG/DL (ref 0.66–1.25)
GFR SERPL CREATININE-BSD FRML MDRD: >90 ML/MIN/{1.73_M2}
GLUCOSE SERPL-MCNC: 115 MG/DL (ref 70–99)
HBA1C MFR BLD: 5.6 % (ref 0–5.6)
HDLC SERPL-MCNC: 50 MG/DL
LDLC SERPL CALC-MCNC: 101 MG/DL
NONHDLC SERPL-MCNC: 114 MG/DL
POTASSIUM SERPL-SCNC: 4.1 MMOL/L (ref 3.4–5.3)
PROT SERPL-MCNC: 7.8 G/DL (ref 6.8–8.8)
SODIUM SERPL-SCNC: 138 MMOL/L (ref 133–144)
TRIGL SERPL-MCNC: 64 MG/DL

## 2020-05-08 PROCEDURE — 80307 DRUG TEST PRSMV CHEM ANLYZR: CPT | Mod: 90 | Performed by: FAMILY MEDICINE

## 2020-05-08 PROCEDURE — 99000 SPECIMEN HANDLING OFFICE-LAB: CPT | Performed by: FAMILY MEDICINE

## 2020-05-08 PROCEDURE — 83036 HEMOGLOBIN GLYCOSYLATED A1C: CPT | Performed by: FAMILY MEDICINE

## 2020-05-08 PROCEDURE — 80061 LIPID PANEL: CPT | Performed by: FAMILY MEDICINE

## 2020-05-08 PROCEDURE — 80053 COMPREHEN METABOLIC PANEL: CPT | Performed by: FAMILY MEDICINE

## 2020-05-08 PROCEDURE — 36415 COLL VENOUS BLD VENIPUNCTURE: CPT | Performed by: FAMILY MEDICINE

## 2020-05-08 NOTE — LETTER
May 12, 2020      Parveen Flor  [address]  [facility] [address]        Dear ,    We are writing to inform you of your test results.    Your test results fall within the expected range(s) or remain unchanged from previous results.  Please continue with current treatment plan.    Resulted Orders   Lipid panel reflex to direct LDL Non-fasting   Result Value Ref Range    Cholesterol 164 <200 mg/dL    Triglycerides 64 <150 mg/dL      Comment:      Non Fasting    HDL Cholesterol 50 >39 mg/dL    LDL Cholesterol Calculated 101 (H) <100 mg/dL      Comment:      Above desirable:  100-129 mg/dl  Borderline High:  130-159 mg/dL  High:             160-189 mg/dL  Very high:       >189 mg/dl      Non HDL Cholesterol 114 <130 mg/dL   Drug  Screen Comprehensive, Urine w/o Reported Meds (Pain Care Package)   Result Value Ref Range    Comprehen Drug Analysis UR FINAL       Comment:      (Note)  ====================================================================  COMPREHENSIVE DRUG ANALYSIS,UR  ====================================================================  Test                             Result       Flag       Units        Drug Present   Carboxy-THC                    794                     ng/mg creat    Carboxy-THC is a metabolite of tetrahydrocannabinol  (THC).    Source of THC is most commonly illicit, but THC is also present    in a scheduled prescription medication.   Ritalinic Acid                 PRESENT                                Ritalinic acid is an expected metabolite of methylphenidate.    Source of methylphenidate is a scheduled prescription medication.  ====================================================================  Test                      Result    Flag   Units      Ref Range        Creatinine              51               mg/dL      >=20            ====================================================================  For clinical   consultation, please call [phone].  ====================================================================  Analysis performed by [facility], [address] [place] [address]     Comprehensive metabolic panel   Result Value Ref Range    Sodium 138 133 - 144 mmol/L    Potassium 4.1 3.4 - 5.3 mmol/L    Chloride 104 94 - 109 mmol/L    Carbon Dioxide 29 20 - 32 mmol/L    Anion Gap 5 3 - 14 mmol/L    Glucose 115 (H) 70 - 99 mg/dL      Comment:      Non Fasting    Urea Nitrogen 21 7 - 30 mg/dL    Creatinine 0.75 0.66 - 1.25 mg/dL    GFR Estimate >90 >60 mL/min/[1.73_m2]      Comment:      Non  GFR Calc  Starting 12/18/2018, serum creatinine based estimated GFR (eGFR) will be   calculated using the Chronic Kidney Disease Epidemiology Collaboration   (CKD-EPI) equation.      GFR Estimate If Black >90 >60 mL/min/[1.73_m2]      Comment:       GFR Calc  Starting 12/18/2018, serum creatinine based estimated GFR (eGFR) will be   calculated using the Chronic Kidney Disease Epidemiology Collaboration   (CKD-EPI) equation.      Calcium 8.8 8.5 - 10.1 mg/dL    Bilirubin Total 0.4 0.2 - 1.3 mg/dL    Albumin 4.0 3.4 - 5.0 g/dL    Protein Total 7.8 6.8 - 8.8 g/dL    Alkaline Phosphatase 79 40 - 150 U/L    ALT 42 0 - 70 U/L    AST 19 0 - 45 U/L   Hemoglobin A1c   Result Value Ref Range    Hemoglobin A1C 5.6 0 - 5.6 %      Comment:      Normal <5.7% Prediabetes 5.7-6.4%  Diabetes 6.5% or higher - adopted from ADA   consensus guidelines.         If you have any questions or concerns, please call the clinic at the number listed above.       Sincerely,      Leon Moss MD

## 2020-05-11 LAB — COMPREHEN DRUG ANALYSIS UR: NORMAL

## 2020-05-27 ENCOUNTER — TELEPHONE (OUTPATIENT)
Dept: FAMILY MEDICINE | Facility: CLINIC | Age: 60
End: 2020-05-27

## 2020-05-27 DIAGNOSIS — M18.12 PRIMARY OSTEOARTHRITIS OF FIRST CARPOMETACARPAL JOINT OF LEFT HAND: ICD-10-CM

## 2020-05-27 DIAGNOSIS — M72.0 DUPUYTREN'S CONTRACTURE OF BOTH HANDS: ICD-10-CM

## 2020-05-27 DIAGNOSIS — M18.11 PRIMARY OSTEOARTHRITIS OF FIRST CARPOMETACARPAL JOINT OF RIGHT HAND: Primary | ICD-10-CM

## 2020-05-27 DIAGNOSIS — M18.0 OSTEOARTHRITIS OF THUMBS, BILATERAL: ICD-10-CM

## 2020-05-27 NOTE — TELEPHONE ENCOUNTER
05/27/2020    Pt walked in today and stated he wanted to request a referral for his ongoing osteoarthritis in his thumbs and wrists. Last seen for this problem about a year ago. Per Flo, he stated the pain was starting to worsening and wants to know his options.     Please contact to advise. Okay to leave msg Suze Bear, Patient  Representative

## 2020-05-27 NOTE — TELEPHONE ENCOUNTER
Call to patient x 2.  Message on # listed was not patient's name.  No other # on file.  Hanh Thompson RN    [facility] [phone]

## 2020-06-01 DIAGNOSIS — E78.5 HYPERLIPIDEMIA LDL GOAL <160: ICD-10-CM

## 2020-06-01 RX ORDER — ATORVASTATIN CALCIUM 40 MG/1
TABLET, FILM COATED ORAL
Qty: 90 TABLET | Refills: 3 | OUTPATIENT
Start: 2020-06-01

## 2020-06-03 DIAGNOSIS — E78.5 HYPERLIPIDEMIA LDL GOAL <160: ICD-10-CM

## 2020-06-03 RX ORDER — ATORVASTATIN CALCIUM 40 MG/1
TABLET, FILM COATED ORAL
Qty: 90 TABLET | Refills: 1 | OUTPATIENT
Start: 2020-06-03

## 2020-07-08 DIAGNOSIS — F90.0 ATTENTION DEFICIT HYPERACTIVITY DISORDER (ADHD), PREDOMINANTLY INATTENTIVE TYPE: ICD-10-CM

## 2020-07-08 RX ORDER — METHYLPHENIDATE HYDROCHLORIDE 20 MG/1
TABLET ORAL
Qty: 75 TABLET | Refills: 0 | Status: SHIPPED | OUTPATIENT
Start: 2020-07-08 | End: 2020-08-03

## 2020-08-01 ENCOUNTER — VIRTUAL VISIT (OUTPATIENT)
Dept: URGENT CARE | Facility: CLINIC | Age: 60
End: 2020-08-01
Payer: MEDICARE

## 2020-08-01 ENCOUNTER — NURSE TRIAGE (OUTPATIENT)
Dept: NURSING | Facility: CLINIC | Age: 60
End: 2020-08-01

## 2020-08-01 DIAGNOSIS — J02.9 ACUTE PHARYNGITIS, UNSPECIFIED ETIOLOGY: Primary | ICD-10-CM

## 2020-08-01 PROCEDURE — 99442 ZZC PHYSICIAN TELEPHONE EVALUATION 11-20 MIN: CPT | Performed by: PHYSICIAN ASSISTANT

## 2020-08-01 RX ORDER — PENICILLIN V POTASSIUM 500 MG/1
500 TABLET, FILM COATED ORAL 2 TIMES DAILY
Qty: 20 TABLET | Refills: 0 | Status: SHIPPED | OUTPATIENT
Start: 2020-08-01 | End: 2020-08-11

## 2020-08-01 NOTE — PROGRESS NOTES
"Parveen Flor is a 59 year old male who is being evaluated via a billable telephone visit.      The patient has been notified of following:     \"This telephone visit will be conducted via a call between you and your physician/provider. We have found that certain health care needs can be provided without the need for a physical exam.  This service lets us provide the care you need with a short phone conversation.  If a prescription is necessary we can send it directly to your pharmacy.  If lab work is needed we can place an order for that and you can then stop by our lab to have the test done at a later time.    Telephone visits are billed at different rates depending on your insurance coverage. During this emergency period, for some insurers they may be billed the same as an in-person visit.  Please reach out to your insurance provider with any questions.    If during the course of the call the physician/provider feels a telephone visit is not appropriate, you will not be charged for this service.\"    Patient has given verbal consent for Telephone visit?  Yes    SUBJECTIVE:   Parveen Flor is a 59 year old male presenting with a chief complaint of feverish, sore throat, fatigue, aches.  Onset of symptoms was 4 day(s) ago.  Course of illness is same.    Severity mild  Current and Associated symptoms: as above  Treatment measures tried include Fluids and Rest.  Predisposing factors include None.    Past Medical History:   Diagnosis Date     Attention deficit disorder, unspecified hyperactivity presence 3/1/2018     Bilateral low back pain without sciatica, unspecified chronicity 3/1/2018     Chemical dependency (H)     Sober x 7 years.     Controlled substance agreement signed 3/1/2018     Dupuytren's contracture of both hands 3/1/2018     Elevated blood pressure reading without diagnosis of hypertension 11/13/2018     Elevated glucose 5/3/2018     Hyperlipidemia LDL goal <160 5/3/2018    The 10-year " ASCVD risk score ([place] GRIFFIN Jr, et al., 2013) is: 10.9%   Values used to calculate the score:     Age: 57 years     Sex: Male     Is Non- : No     Diabetic: No     Tobacco smoker: No     Systolic Blood Pressure: 135 mmHg     Is BP treated: No     HDL Cholesterol: 45 mg/dL     Total Cholesterol: 234 mg/dL      Learning disability 2020     Leukoplakia of tongue 3/1/2018     Mucous cyst of tonsil 2018     Osteoarthritis of thumbs, bilateral 3/1/2018     Rotator cuff syndrome, left 3/1/2018     Rotator cuff syndrome, right 3/1/2018     Sebaceous cyst 2018     Current Outpatient Medications   Medication Sig Dispense Refill     atorvastatin (LIPITOR) 40 MG tablet Take 1 tablet (40 mg) by mouth daily 90 tablet 3     diclofenac (VOLTAREN) 1 % topical gel Place onto the skin 4 times daily 100 g 3     IBUPROFEN PO        methylphenidate (RITALIN) 20 MG tablet ** START DATE: 20 ** TAKE ONE TABLET BY MOUTH EVERY MORNING; TAKE ONE TABLET AT MIDDAY AND TAKE ONE-HALF TABLET EVERY NIGHT AT BEDTIME 75 tablet 0     methylphenidate (RITALIN) 20 MG tablet SIG TAKE ONE TABLET BY MOUTH EVERY MORNING; TAKE ONE TABLETS AT MIDDAY AND TAKE ONE-HALF TABLET EVERY NIGHT AT BEDTIME 75 tablet 0     methylphenidate (RITALIN) 20 MG tablet SIG TAKE ONE TABLET BY MOUTH EVERY MORNING; TAKE ONE TABLETS AT MIDDAY AND TAKE ONE-HALF TABLET EVERY NIGHT AT BEDTIME 75 tablet 0     Social History     Tobacco Use     Smoking status: Former Smoker     Packs/day: 0.50     Years: 4.00     Pack years: 2.00     Types: Cigarettes     Last attempt to quit: 2017     Years since quittin.7     Smokeless tobacco: Never Used   Substance Use Topics     Alcohol use: No       ROS:  10 point ROS negative except as listed above      OBJECTIVE:  NA      ASSESSMENT:  (J02.9) Acute pharyngitis, unspecified etiology  (primary encounter diagnosis)  Comment: cannot rule out covid/strep  Plan: penicillin V (VEETID) 500 MG tablet,          Symptomatic COVID-19 Virus (Coronavirus) by PCR    Rest, isolate, fluids, follow up if worsening          Phone call duration:  15 minutes    Js Carreon PA-C

## 2020-08-01 NOTE — TELEPHONE ENCOUNTER
"Patient reporting \"I didn't feel good on Tuesday (7/28/20) .\" Symptoms starting with \"fever.\" Reporting sore throat. Patient feels symptoms are improving. He is questioning what to do? Stating he has been in isolation.   Patient reporting he would like to know if he has strep or COVID.    [facility] and was unable to complete this.   Transferred to Central Scheduling to request Virtual Visit.    Rosanna Dey RN  [facility]      COVID 19 Nurse Triage Plan/Patient Instructions    Please be aware that novel coronavirus (COVID-19) may be circulating in the community. If you develop symptoms such as fever, cough, or SOB or if you have concerns about the presence of another infection including coronavirus (COVID-19), please contact your health care provider or visit [email or web address].     Disposition/Instructions    Virtual Visit with provider recommended. Reference Visit Selection Guide.    Thank you for taking steps to prevent the spread of this virus.  o Limit your contact with others.  o Wear a simple mask to cover your cough.  o Wash your hands well and often.    Resources    [facility]: About COVID-19: [email or web address]    CDC: What to Do If You're Sick: www.cdc.gov/coronavirus/2019-ncov/about/steps-when-sick.html    CDC: Ending Home Isolation: www.cdc.gov/coronavirus/2019-ncov/hcp/disposition-in-home-patients.html     CDC: Caring for Someone: www.cdc.gov/coronavirus/2019-ncov/if-you-are-sick/care-for-someone.html     [facility]: Interim Guidance for Hospital Discharge to Home: www.health.[facility]/diseases/coronavirus/hcp/hospdischarge.pdf    [facility] clinical trials (COVID-19 research studies): clinicalaffairs.[facility]/umn-clinical-trials     Below are the COVID-19 hotlines at the [place] Department of [facility] ([facility]). Interpreters are available.   o For health questions: Call 300-497-3320 or 1-533.124.8105 (7 " a.m. to 7 p.m.)  o For questions about schools and childcare: Call 978-264-1778 or 1-335.513.3521 (7 a.m. to 7 p.m.)                     Reason for Disposition    [1] COVID-19 infection suspected by caller or triager AND [2] mild symptoms (cough, fever, or others) AND [3] no complications or SOB    Additional Information    Negative: SEVERE difficulty breathing (e.g., struggling for each breath, speaks in single words)    Negative: Difficult to awaken or acting confused (e.g., disoriented, slurred speech)    Negative: Bluish (or gray) lips or face now    Negative: Shock suspected (e.g., cold/pale/clammy skin, too weak to stand, low BP, rapid pulse)    Negative: Sounds like a life-threatening emergency to the triager    Negative: SEVERE or constant chest pain or pressure (Exception: mild central chest pain, present only when coughing)    Negative: MODERATE difficulty breathing (e.g., speaks in phrases, SOB even at rest, pulse 100-120)    Negative: Patient sounds very sick or weak to the triager    Negative: MILD difficulty breathing (e.g., minimal/no SOB at rest, SOB with walking, pulse <100)    Negative: Chest pain or pressure    Negative: Fever > 103 F (39.4 C)    Negative: [1] Fever > 101 F (38.3 C) AND [2] age > 60    Negative: [1] Fever > 100.0 F (37.8 C) AND [2] bedridden (e.g., nursing home patient, CVA, chronic illness, recovering from surgery)    Negative: HIGH RISK patient (e.g., age > 64 years, diabetes, heart or lung disease, weak immune system)    Negative: [1] Fever returns after gone for over 24 hours AND [2] symptoms worse or not improved    Negative: Fever present > 3 days (72 hours)    Negative: [1] Continuous (nonstop) coughing interferes with work or school AND [2] no improvement using cough treatment per protocol    Protocols used: CORONAVIRUS (COVID-19) DIAGNOSED OR [id number]-TS 5.16.20

## 2020-08-03 ENCOUNTER — TELEPHONE (OUTPATIENT)
Dept: FAMILY MEDICINE | Facility: CLINIC | Age: 60
End: 2020-08-03

## 2020-08-03 ENCOUNTER — VIRTUAL VISIT (OUTPATIENT)
Dept: FAMILY MEDICINE | Facility: CLINIC | Age: 60
End: 2020-08-03
Payer: MEDICARE

## 2020-08-03 DIAGNOSIS — F90.0 ATTENTION DEFICIT HYPERACTIVITY DISORDER (ADHD), PREDOMINANTLY INATTENTIVE TYPE: ICD-10-CM

## 2020-08-03 DIAGNOSIS — M72.0 DUPUYTREN'S CONTRACTURE OF BOTH HANDS: ICD-10-CM

## 2020-08-03 DIAGNOSIS — J06.9 UPPER RESPIRATORY TRACT INFECTION, UNSPECIFIED TYPE: ICD-10-CM

## 2020-08-03 DIAGNOSIS — F90.0 ATTENTION DEFICIT HYPERACTIVITY DISORDER (ADHD), PREDOMINANTLY INATTENTIVE TYPE: Primary | ICD-10-CM

## 2020-08-03 DIAGNOSIS — M18.0 OSTEOARTHRITIS OF THUMBS, BILATERAL: ICD-10-CM

## 2020-08-03 PROCEDURE — 99214 OFFICE O/P EST MOD 30 MIN: CPT | Performed by: FAMILY MEDICINE

## 2020-08-03 RX ORDER — METHYLPHENIDATE HYDROCHLORIDE 20 MG/1
TABLET ORAL
Qty: 150 TABLET | Refills: 0 | Status: SHIPPED | OUTPATIENT
Start: 2020-08-03 | End: 2020-08-03

## 2020-08-03 RX ORDER — METHYLPHENIDATE HYDROCHLORIDE 20 MG/1
20 TABLET ORAL DAILY
Qty: 30 TABLET | Refills: 0 | Status: SHIPPED | OUTPATIENT
Start: 2020-10-04 | End: 2020-08-03

## 2020-08-03 RX ORDER — METHYLPHENIDATE HYDROCHLORIDE 20 MG/1
TABLET ORAL
Qty: 150 TABLET | Refills: 0 | Status: CANCELLED | OUTPATIENT
Start: 2020-08-03

## 2020-08-03 RX ORDER — METHYLPHENIDATE HYDROCHLORIDE 20 MG/1
20 TABLET ORAL DAILY
Qty: 30 TABLET | Refills: 0 | Status: SHIPPED | OUTPATIENT
Start: 2020-08-03 | End: 2020-08-03

## 2020-08-03 RX ORDER — METHYLPHENIDATE HYDROCHLORIDE 20 MG/1
20 TABLET ORAL DAILY
Qty: 30 TABLET | Refills: 0 | Status: SHIPPED | OUTPATIENT
Start: 2020-09-03 | End: 2020-08-03

## 2020-08-03 NOTE — TELEPHONE ENCOUNTER
Patient last picked up methylphenidate 20mg qty 75 for 30 days on 7/9/20.    The new prescription was written for qty 30 for 30 days.    Is it ok to fill this new prescription early (today)?    Thanks,  Tatum Rankin Feliciano  [facility]  [phone]

## 2020-08-03 NOTE — PROGRESS NOTES
"Parveen Flor is a 59 year old male who is being evaluated via a billable telephone visit.      The patient has been notified of following:     \"This telephone visit will be conducted via a call between you and your physician/provider. We have found that certain health care needs can be provided without the need for a physical exam.  This service lets us provide the care you need with a short phone conversation.  If a prescription is necessary we can send it directly to your pharmacy.  If lab work is needed we can place an order for that and you can then stop by our lab to have the test done at a later time.    Telephone visits are billed at different rates depending on your insurance coverage. During this emergency period, for some insurers they may be billed the same as an in-person visit.  Please reach out to your insurance provider with any questions.    If during the course of the call the physician/provider feels a telephone visit is not appropriate, you will not be charged for this service.\"    Patient has given verbal consent for Telephone visit?  Yes    What phone number would you like to be contacted at? [phone]    How would you like to obtain your AVS? Mail a copy    Subjective     Parveen Flor is a 59 year old male who presents via phone visit today for the following health issues:    HPI     Attention deficit hyperactivity disorder (ADHD), predominantly inattentive type  (primary encounter diagnosis)  ADHD Follow-Up (Adult)  Concerns: none  Changes since last visit: Worse hasn't taken meds since Tuesday  Taking controlled (daily) medications as prescribed: Yes Details: hasn't taken since Tuesday  Sleep: restless sleep  Adult ADHD Self-Reporting form given to patient?:  No  Currently in counseling: No    Medication Benefits:   Controlled symptoms: Hyperactivity - motor restlessness, Attention span, Distractability and Finishing tasks  Uncontrolled symptoms:  Hyperactivity - motor " "restlessness, Attention span, Distractability, Finishing tasks and Impulse control    Medication Side Effects:  Reports:  stomach ache  Sleep Problems? Yes Details: since Tueday  ++++++++++++++++++++++++++++++++++++++++++++++++    Employer Concerns/Feedback: None  Coworker Concerns:   None  Home/Family Concerns: Worse-animated with girlfriend        Hyperlipidemia Follow-Up      Are you regularly taking any medication or supplement to lower your cholesterol?   Yes- lipittor    Are you having muscle aches or other side effects that you think could be caused by your cholesterol lowering medication?  No      How many servings of fruits and vegetables do you eat daily?  0-1    On average, how many sweetened beverages do you drink each day (Examples: soda, juice, sweet tea, etc.  Do NOT count diet or artificially sweetened beverages)?   0    How many days per week do you exercise enough to make your heart beat faster? 3 or less    How many minutes a day do you exercise enough to make your heart beat faster? 10 - 19  How many days per week do you miss taking your medication? 6    What makes it hard for you to take your medications?  threw them away  has refills         Upper respiratory tract infection, unspecified type COVID tomorrow. Tonsillar \"pus\" squeezed  Dupuytren's contracture of both hands evaluated, no surgery recommendation  Osteoarthritis of thumbs, bilateral splints      Past Medical History:   Diagnosis Date     Attention deficit disorder, unspecified hyperactivity presence 3/1/2018     Bilateral low back pain without sciatica, unspecified chronicity 3/1/2018     Chemical dependency (H)     Sober x 7 years.     Controlled substance agreement signed 3/1/2018     Dupuytren's contracture of both hands 3/1/2018     Elevated blood pressure reading without diagnosis of hypertension 11/13/2018     Elevated glucose 5/3/2018     Hyperlipidemia LDL goal <160 5/3/2018    The 10-year ASCVD risk score ([place] ELIAS Jr, et " al., 2013) is: 10.9%   Values used to calculate the score:     Age: 57 years     Sex: Male     Is Non- : No     Diabetic: No     Tobacco smoker: No     Systolic Blood Pressure: 135 mmHg     Is BP treated: No     HDL Cholesterol: 45 mg/dL     Total Cholesterol: 234 mg/dL      Learning disability 2020     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 No pertinent surgical history.    Family History   Problem Relation Age of Onset     Heart Disease Paternal Grandmother      Arthritis Paternal Grandmother      Alcohol/Drug Maternal Grandfather      Cancer Maternal Grandmother         Liver       Social History     Tobacco Use     Smoking status: Former Smoker     Packs/day: 0.50     Years: 4.00     Pack years: 2.00     Types: Cigarettes     Last attempt to quit: 2017     Years since quittin.7     Smokeless tobacco: Never Used   Substance Use Topics     Alcohol use: No         Reviewed and updated as needed this visit by Provider  Allergies  Meds  Med Hx  Surg Hx         Review of Systems   Fever resolved, no cough       Objective   Reported vitals:  There were no vitals taken for this visit.   healthy, alert and no distress  PSYCH: Alert  His affect is pressured, cheerful  RESP: No cough, no audible wheezing, able to talk in full sentences  Remainder of exam unable to be completed due to telephone visits            Assessment/Plan:    Problem List Items Addressed This Visit        Respiratory    Upper respiratory tract infection, unspecified type     Empiric penicillin, COVID test tomorrow            Musculoskeletal and Integumentary    Dupuytren's contracture of both hands     No surgery at this time         Osteoarthritis of thumbs, bilateral      splints            Behavioral     Attention deficit hyperactivity disorder (ADHD), predominantly inattentive type - Primary     Out of meds, up all night,scattered. Resume therapy         Relevant Medications    methylphenidate (RITALIN) 20 MG tablet    methylphenidate (RITALIN) 20 MG tablet (Start on 9/3/2020)    methylphenidate (RITALIN) 20 MG tablet (Start on 10/4/2020)            Return in about 12 weeks (around 10/26/2020) for flu shot.      Phone call duration:  9 minutes    Leon Moss MD

## 2020-08-03 NOTE — TELEPHONE ENCOUNTER
Please do not close this encounter until this has been addressed.  (prior auth approved/denied, prescriber refusal to complete prior auth or medication changed/discontinued)  Plan limits to 3.6 tabs per day.    Prior Authorization needed on: methylphenidate 20mg  Drug NDC: [address]-11     Insurance: [employer] part D  Bin:  [id number]  Pcn:  [id number] Group: [id number]  Member ID: [id number]   Insurance phone #: [phone]    Pharmacy NPI: [id number]  Pharmacy Phone #: [phone]  Pharmacy Fax #: [phone]    Please let us know if the PA gets approved or denied or if medication is changed    Please change medication or provide reasoning/documentation and forward to PA Team at [id number].    Thanks,  Tatum Rankin CPhT  [facility]  [phone]

## 2020-08-04 DIAGNOSIS — J02.9 ACUTE PHARYNGITIS, UNSPECIFIED ETIOLOGY: ICD-10-CM

## 2020-08-04 PROCEDURE — U0003 INFECTIOUS AGENT DETECTION BY NUCLEIC ACID (DNA OR RNA); SEVERE ACUTE RESPIRATORY SYNDROME CORONAVIRUS 2 (SARS-COV-2) (CORONAVIRUS DISEASE [COVID-19]), AMPLIFIED PROBE TECHNIQUE, MAKING USE OF HIGH THROUGHPUT TECHNOLOGIES AS DESCRIBED BY CMS-2020-01-R: HCPCS | Performed by: PHYSICIAN ASSISTANT

## 2020-08-04 NOTE — TELEPHONE ENCOUNTER
Prior Authorization Approval    Authorization Effective Date: 8/1/2020  Authorization Expiration Date: 12/31/2020  Medication: ritalin 20-APPROVED  Approved Dose/Quantity:    Reference #:     Insurance Company: [employer] - Phone [phone] Fax [phone]  Expected CoPay:       CoPay Card Available:      Foundation Assistance Needed:    Which Pharmacy is filling the prescription (Not needed for infusion/clinic administered[place] [facility] [place] [address] [facility]  Pharmacy Notified: Yes  Patient Notified: Yes  **Instructed pharmacy to notify patient when script is ready to /ship.**

## 2020-08-04 NOTE — TELEPHONE ENCOUNTER
Central Prior Authorization Team   Phone: [phone]    PA Initiation    Medication: ritalin 20 limited to 3.6 tabs per day per ins  Insurance Company: [employer] - Phone [phone] Fax [phone]  Pharmacy Filling the Rx: [place], [place] - [address]  Filling Pharmacy Phone: [phone]  Filling Pharmacy Fax: [phone]  Start Date: 8/4/2020

## 2020-08-06 LAB
SARS-COV-2 RNA SPEC QL NAA+PROBE: NOT DETECTED
SPECIMEN SOURCE: NORMAL

## 2020-08-12 ENCOUNTER — NURSE TRIAGE (OUTPATIENT)
Dept: NURSING | Facility: CLINIC | Age: 60
End: 2020-08-12

## 2020-08-12 NOTE — TELEPHONE ENCOUNTER
[facility] is requesting covid results.    Coronavirus (COVID-19) Notification    Lab Result   Lab test 2019-nCoV rRt-PCR OR SARS-COV-2 PCR    Nasopharyngeal AND/OR Oropharyngeal swab is NEGATIVE for 2019-nCoV RNA [OR] SARS-COV-2 RNA (COVID-19) RNA    Your result was negative. This means that we didn't find the virus that causes COVID-19 in your sample. A test may show negative when you do actually have the virus. This can happen when the virus is in the early stages of infection, before you feel illness symptoms.    If you have symptoms   Stay home and away from others (self-isolate) until you meet ALL of the guidelines below:    You've had no fever--and no medicine that reduces fever--for 3 full days (72 hours). And      Your other symptoms have gotten better. For example, your cough or breathing has improved. And     At least 10 days have passed since your symptoms started.    During this time:    Stay home. Don't go to work, school or anywhere else.     Stay in your own room, including for meals. Use your own bathroom if you can.    Stay away from others in your home. No hugging, kissing or shaking hands. No visitors.    Clean  high touch  surfaces often (doorknobs, counters, handles, etc.). Use a household cleaning spray or wipes. You can find a full list on the EPA website at www.epa.gov/pesticide-registration/list-n-disinfectants-use-against-sars-cov-2.    Cover your mouth and nose with a mask, tissue or washcloth to avoid spreading germs.    Wash your hands and face often with soap and water.    Going back to work  Check with your employer for any guidelines to follow for going back to work.  You are sent a letter for your Employer which will serve as formal document notice that you, the employee, tested negative for COVID-19, as of the testing date shown above.    If your symptoms worsen or other concerning symptoms, contact PCP, oncare or consider returning to Emergency Dept.    Where can I get more  information?    [facility]: [email or web address]    Coronavirus Basics: www.health.[facility].mn.us/diseases/coronavirus/basics.html    [facility] Hotline ([phone])    {Name]  Radha Nath RN/PANCHO      Additional Information    Negative: Nursing judgment, per information in Reference    Negative: Information only call about a Well Adult (no illness or injury)    Negative: Nursing judgment or information in reference    Negative: Nursing judgment or information in reference    Negative: Nursing judgment or information in reference    Negative: Nursing judgment or information in reference    Negative: Nursing judgment or information in reference    Negative: Nursing judgment or information in reference    Negative: Nursing judgment or information in reference    Negative: Nursing judgment or information in reference    Negative: Nursing judgment or information in reference    Negative: Nursing judgment or information in reference    Negative: Nursing judgment or information in reference    Negative: Nursing judgment or information in reference    Negative: Nursing judgment or information in reference    Nursing judgment or information in reference    Protocols used: NO GUIDELINE [id number]

## 2020-10-14 DIAGNOSIS — F90.0 ATTENTION DEFICIT HYPERACTIVITY DISORDER (ADHD), PREDOMINANTLY INATTENTIVE TYPE: ICD-10-CM

## 2020-10-14 RX ORDER — METHYLPHENIDATE HYDROCHLORIDE 20 MG/1
TABLET ORAL
Qty: 150 TABLET | Refills: 0 | Status: SHIPPED | OUTPATIENT
Start: 2020-10-14 | End: 2020-12-12

## 2020-10-14 NOTE — TELEPHONE ENCOUNTER
Routing refill request to provider for review/approval because:  Drug not on the [facility] refill protocol     Tessa Godinez RN   [facility] -- Triage Nurse

## 2020-12-11 DIAGNOSIS — F90.0 ATTENTION DEFICIT HYPERACTIVITY DISORDER (ADHD), PREDOMINANTLY INATTENTIVE TYPE: ICD-10-CM

## 2020-12-11 NOTE — TELEPHONE ENCOUNTER
Routing refill request to provider for review/approval because:  Drug not on the [facility] refill protocol     Tessa Godinez RN   [facility] -- Triage Nurse

## 2020-12-12 RX ORDER — METHYLPHENIDATE HYDROCHLORIDE 20 MG/1
TABLET ORAL
Qty: 150 TABLET | Refills: 0 | Status: SHIPPED | OUTPATIENT
Start: 2020-12-12 | End: 2021-01-08

## 2020-12-17 PROBLEM — G89.29 CHRONIC LOW BACK PAIN: Status: ACTIVE | Noted: 2018-03-01

## 2020-12-17 RX ORDER — METHYLPHENIDATE HYDROCHLORIDE 10 MG/1
TABLET ORAL
COMMUNITY
Start: 2020-02-12 | End: 2021-02-08

## 2020-12-17 NOTE — PROGRESS NOTES
Pre-Visit Planning   Next 5 appointments (look out 90 days)    Dec 28, 2020  9:00 AM  (Arrive by 8:45 AM)  Phone Visit with Leon Moss MD  [facility] [address] [place] [id number] [phone]        Appointment Notes for this encounter:      med check- ADHD   Phone visit     Questionnaires Reviewed/Assigned  No additional questionnaires are needed      Patient preferred phone number: [phone]    Unable to reach. Left voicemail. Advised patient to call clinic back at [phone].

## 2020-12-28 ENCOUNTER — VIRTUAL VISIT (OUTPATIENT)
Dept: FAMILY MEDICINE | Facility: CLINIC | Age: 60
End: 2020-12-28
Payer: MEDICARE

## 2020-12-28 VITALS — BODY MASS INDEX: 23.24 KG/M2 | WEIGHT: 166 LBS | HEIGHT: 71 IN | RESPIRATION RATE: 20 BRPM

## 2020-12-28 DIAGNOSIS — M72.0 DUPUYTREN'S CONTRACTURE OF BOTH HANDS: ICD-10-CM

## 2020-12-28 DIAGNOSIS — E78.5 HYPERLIPIDEMIA LDL GOAL <160: ICD-10-CM

## 2020-12-28 DIAGNOSIS — M18.0 OSTEOARTHRITIS OF THUMBS, BILATERAL: ICD-10-CM

## 2020-12-28 DIAGNOSIS — M19.041 PRIMARY OSTEOARTHRITIS OF BOTH HANDS: ICD-10-CM

## 2020-12-28 DIAGNOSIS — F90.0 ATTENTION DEFICIT HYPERACTIVITY DISORDER (ADHD), PREDOMINANTLY INATTENTIVE TYPE: Primary | ICD-10-CM

## 2020-12-28 DIAGNOSIS — M19.042 PRIMARY OSTEOARTHRITIS OF BOTH HANDS: ICD-10-CM

## 2020-12-28 PROBLEM — J06.9 UPPER RESPIRATORY TRACT INFECTION, UNSPECIFIED TYPE: Status: RESOLVED | Noted: 2020-08-03 | Resolved: 2020-12-28

## 2020-12-28 PROBLEM — R10.9 RIGHT FLANK PAIN: Status: RESOLVED | Noted: 2019-09-05 | Resolved: 2020-12-28

## 2020-12-28 PROCEDURE — 99441 PR PHYSICIAN TELEPHONE EVALUATION 5-10 MIN: CPT | Mod: 95 | Performed by: FAMILY MEDICINE

## 2020-12-28 RX ORDER — ATORVASTATIN CALCIUM 40 MG/1
40 TABLET, FILM COATED ORAL DAILY
Qty: 90 TABLET | Refills: 0 | Status: SHIPPED | OUTPATIENT
Start: 2020-12-28 | End: 2021-07-08

## 2020-12-28 ASSESSMENT — MIFFLIN-ST. JEOR: SCORE: 1575.1

## 2020-12-28 ASSESSMENT — PAIN SCALES - GENERAL: PAINLEVEL: NO PAIN (0)

## 2020-12-28 NOTE — PROGRESS NOTES
"Parveen Flor is a 60 year old male who is being evaluated via a billable telephone visit.      The patient has been notified of following:     \"This telephone visit will be conducted via a call between you and your physician/provider. We have found that certain health care needs can be provided without the need for a physical exam.  This service lets us provide the care you need with a short phone conversation.  If a prescription is necessary we can send it directly to your pharmacy.  If lab work is needed we can place an order for that and you can then stop by our lab to have the test done at a later time.    Telephone visits are billed at different rates depending on your insurance coverage. During this emergency period, for some insurers they may be billed the same as an in-person visit.  Please reach out to your insurance provider with any questions.    If during the course of the call the physician/provider feels a telephone visit is not appropriate, you will not be charged for this service.\"    Patient has given verbal consent for Telephone visit?  Yes    What phone number would you like to be contacted at? [phone]    How would you like to obtain your AVS? Mail a copy    Subjective     Parveen Flor is a 60 year old male who presents via phone visit today for the following health issues:    HPI     ADHD Follow-Up (Adult)  Concerns: ADHD  Changes since last visit: Improving  Taking controlled (daily) medications as prescribed: Yes  Sleep: no problems  Adult ADHD Self-Reporting form given to patient?:  No  Currently in counseling: Yes    Medication Benefits:   Controlled symptoms: Hyperactivity - motor restlessness, Attention span, Distractability, Finishing tasks, Impulse control, Frustration tolerance, Accepting limits and Peer relations  Uncontrolled symptoms:  Attention span, Distractability and Accepting limits    Medication Side Effects:  Reports:  none  Sleep Problems? " no  ++++++++++++++++++++++++++++++++++++++++++++++++    Employer Concerns/Feedback: Improving  Coworker Concerns:   Improving  Home/Family Concerns: Improving    Attention deficit hyperactivity disorder (ADHD), predominantly inattentive type  (primary encounter diagnosis)  Hyperlipidemia LDL goal <160  Osteoarthritis of thumbs, bilateral he has previously been to hand therapy  Dupuytren's contracture of both hands  Primary osteoarthritis of both hands he thinks his hands are getting worse    Past Medical History:   Diagnosis Date     Attention deficit disorder, unspecified hyperactivity presence 3/1/2018     Bilateral low back pain without sciatica, unspecified chronicity 3/1/2018     Chemical dependency (H)     Sober x 7 years.     Controlled substance agreement signed 3/1/2018     Dupuytren's contracture of both hands 3/1/2018     Elevated blood pressure reading without diagnosis of hypertension 11/13/2018     Elevated glucose 5/3/2018     Hyperlipidemia LDL goal <160 5/3/2018    The 10-year ASCVD risk score ([place] ELIAS Jr, et al., 2013) is: 10.9%   Values used to calculate the score:     Age: 57 years     Sex: Male     Is Non- : No     Diabetic: No     Tobacco smoker: No     Systolic Blood Pressure: 135 mmHg     Is BP treated: No     HDL Cholesterol: 45 mg/dL     Total Cholesterol: 234 mg/dL      Learning disability 1/9/2020     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 No pertinent surgical history.    Family History   Problem Relation Age of Onset     Heart Disease Paternal Grandmother      Arthritis Paternal Grandmother      Alcohol/Drug Maternal Grandfather      Cancer Maternal Grandmother         Liver       Social History  "    Tobacco Use     Smoking status: Former Smoker     Packs/day: 0.50     Years: 4.00     Pack years: 2.00     Types: Cigarettes     Quit date: 11/2017     Years since quitting: 3.1     Smokeless tobacco: Never Used   Substance Use Topics     Alcohol use: No                       Review of Systems   Constitutional, HEENT, cardiovascular, pulmonary, gi and gu systems are negative, except as otherwise noted.       Objective   Vitals - Patient Reported  Pain Score: No Pain (0)      Vitals:  No vitals were obtained today due to virtual visit.    healthy, alert and no distress  PSYCH: Alert and oriented times 3; coherent speech, normal   rate and volume, able to articulate logical thoughts, able   to abstract reason, no tangential thoughts, no hallucinations   or delusions  His affect is normal  RESP: No cough, no audible wheezing, able to talk in full sentences  Remainder of exam unable to be completed due to telephone visits            Assessment/Plan:    Assessment & Plan   Problem List Items Addressed This Visit     Dupuytren's contracture of both hands     Inspection may suggest additional therapeutic interventions         Relevant Orders    Rheumatology Referral    Osteoarthritis of thumbs, bilateral     He has custom braces         Relevant Orders    Rheumatology Referral    Hyperlipidemia LDL goal <160     Statin therapy.  Continue         Relevant Medications    atorvastatin (LIPITOR) 40 MG tablet    Attention deficit hyperactivity disorder (ADHD), predominantly inattentive type - Primary     Stable.  He considers himself to be improving with scheduled pharmacology         Relevant Medications    methylphenidate (RITALIN) 10 MG tablet    Primary osteoarthritis of both hands     He is interested in finding out what additional options may do for him although he considers cortisone shots to be like opioids and he is afraid to \"hide\" his pain with fears that he will \"overdo\"                         Return in about 5 " months (around 5/28/2021) for virtual visit, Physical Exam, Lab Work.    Leon Moss MD  [facility]    Phone call duration:  7 minutes

## 2020-12-28 NOTE — ASSESSMENT & PLAN NOTE
"He is interested in finding out what additional options may do for him although he considers cortisone shots to be like opioids and he is afraid to \"hide\" his pain with fears that he will \"overdo\"  "

## 2021-02-08 DIAGNOSIS — F90.0 ATTENTION DEFICIT HYPERACTIVITY DISORDER (ADHD), PREDOMINANTLY INATTENTIVE TYPE: ICD-10-CM

## 2021-02-08 RX ORDER — METHYLPHENIDATE HYDROCHLORIDE 20 MG/1
TABLET ORAL
Qty: 150 TABLET | Refills: 0 | OUTPATIENT
Start: 2021-02-08

## 2021-02-08 RX ORDER — METHYLPHENIDATE HYDROCHLORIDE 20 MG/1
TABLET ORAL
Qty: 150 TABLET | Refills: 0 | Status: SHIPPED | OUTPATIENT
Start: 2021-03-10 | End: 2021-10-06

## 2021-02-08 RX ORDER — METHYLPHENIDATE HYDROCHLORIDE 20 MG/1
TABLET ORAL
Qty: 150 TABLET | Refills: 0 | Status: SHIPPED | OUTPATIENT
Start: 2021-04-10 | End: 2021-05-09

## 2021-02-08 RX ORDER — METHYLPHENIDATE HYDROCHLORIDE 20 MG/1
TABLET ORAL
Qty: 150 TABLET | Refills: 0 | Status: SHIPPED | OUTPATIENT
Start: 2021-02-08 | End: 2021-10-06

## 2021-02-08 NOTE — TELEPHONE ENCOUNTER
Routing refill request to provider for review/approval because:  Drug not on the [facility] refill protocol     Aicha Parmar RN on 2/8/2021 at 9:52 AM

## 2021-02-23 ENCOUNTER — TRANSFERRED RECORDS (OUTPATIENT)
Dept: HEALTH INFORMATION MANAGEMENT | Facility: CLINIC | Age: 61
End: 2021-02-23

## 2021-02-23 ENCOUNTER — OFFICE VISIT - HEALTHEAST (OUTPATIENT)
Dept: RHEUMATOLOGY | Facility: CLINIC | Age: 61
End: 2021-02-23

## 2021-02-23 DIAGNOSIS — M25.50 POLYARTHRALGIA: ICD-10-CM

## 2021-02-23 DIAGNOSIS — M72.0 DUPUYTREN'S CONTRACTURE OF BOTH HANDS: ICD-10-CM

## 2021-02-23 DIAGNOSIS — M15.0 PRIMARY OSTEOARTHRITIS INVOLVING MULTIPLE JOINTS: ICD-10-CM

## 2021-06-05 VITALS
HEART RATE: 80 BPM | SYSTOLIC BLOOD PRESSURE: 130 MMHG | BODY MASS INDEX: 21.84 KG/M2 | WEIGHT: 156.6 LBS | DIASTOLIC BLOOD PRESSURE: 84 MMHG

## 2021-06-07 DIAGNOSIS — F90.0 ATTENTION DEFICIT HYPERACTIVITY DISORDER (ADHD), PREDOMINANTLY INATTENTIVE TYPE: ICD-10-CM

## 2021-06-07 RX ORDER — METHYLPHENIDATE HYDROCHLORIDE 20 MG/1
TABLET ORAL
Qty: 150 TABLET | Refills: 0 | Status: SHIPPED | OUTPATIENT
Start: 2021-06-07 | End: 2021-10-06

## 2021-06-07 NOTE — TELEPHONE ENCOUNTER
Routing refill request to provider for review/approval because:  Drug not on the [facility] refill protocol     Tessa Godinez RN   [facility] -- Triage Nurse

## 2021-06-15 NOTE — PROGRESS NOTES
This document was created using a software with less than 100% fidelity, at times resulting in unintended, even erroneous syntax and grammar.  The reader is advised to keep this under consideration while reviewing, interpreting this note.    ASSESSMENT AND PLAN:  Parveen Flor 60 y.o. male is seen here on 02/23/21 for evaluation of painful hands worse on the left side, secondary to osteoarthritis of the first CMC, we discussed options, he has the option of taking surgical intervention, or use of brace, corticosteroid injection with pros and cons of which were outlined he wants to try the latter, 20 mg of Kenalog under ultrasound guidance injected into the left first CMC.  He tolerated well.  We will meet here in 4 months.    Diagnoses and all orders for this visit:    Polyarthralgia  -     triamcinolone acetonide 40 mg/mL injection 20 mg (KENALOG-40)    Primary osteoarthritis involving multiple joints  -     triamcinolone acetonide 40 mg/mL injection 20 mg (KENALOG-40)    Dupuytren's contracture of both hands      HISTORY OF PRESENTING ILLNESS:  Parveen Flor, 60 y.o., male is here for evaluation of arthralgias.  The most significant pain that he has experienced over the past several years is in the hands, he points to the bases of the thumbs, and the past he has had x-rays of the hands taken, attached in the chart, shows advanced osteoarthritic changes, loss of cartilage of the first CMC's bilaterally, he reports that day-to-day activities are getting harder, he has found himself dropping things quite easily and feels that his strength in the hand especially the left is not as good, sometimes he is woken up from sleep,, he is currently off work, he used to work as a  for the [employer]..  He has noted other joint areas that hurt to suggest the knees and the hips and the lower back.  He does not have history of psoriasis himself or the family ulcerative colitis or Crohn's disease.  He is  accompanied by his wife.  He has tried over-the-counter measures without help.  2 or 3 years ago he recalls he was seen in orthopedics.  He was offered a brace, corticosteroid injection, and surgical options were discussed.  He decided not to pursue any of that at the time.  Since then he has used various over-the-counter measures including ibuprofen, with marginal relief.  He describes himself otherwise in good health he is is a smoker, intermittently, no alcohol use.  His brother is noted to have Dupuytren's.  Rest of the pertinent review of systems negative.    ALLERGIES:Patient has no known allergies.    PAST MEDICAL/ACTIVE PROBLEMS/MEDICATION/ FAMILY HISTORY/SOCIAL DATA:  The patient has a family history of  No past medical history on file.  Social History     Tobacco Use   Smoking Status Not on file     There is no problem list on file for this patient.    Current Outpatient Medications   Medication Sig Dispense Refill     atorvastatin (LIPITOR) 40 MG tablet Take 40 mg by mouth daily.       ibuprofen (ADVIL) 200 MG tablet Take 200 mg by mouth as needed. 2-3 tablets prn       methylphenidate HCl (RITALIN) 20 MG tablet 20 mg 3 (three) times a day. Take 2 tabs in am 2 tabs in afternoon and 1 tab at night       No current facility-administered medications for this visit.        COMPREHENSIVE EXAMINATION:  Vitals:    02/23/21 1157   BP: 130/84   Pulse: 80   Weight: 156 lb 9.6 oz (71 kg)     A well appearing alert oriented male. Well appearing alert oriented.   Examination of the eyes revealed no redness, obvious scleromalacia.  ENT examination shows no nasal deformity such as of saddle type, external ear without signs of inflamm/deformity ation.  Cardiopulmonary examination without obvious signs of dyspnea, no wheezing is audible, no cyanosis.  There is no finger clubbing.  Skin examination performed for heliotrope, malar area eruption periungual erythema, lupus pernio.  Neurological examination shows the speech is  fluent, no facial asymmetry, muscle power in the upper extremities proximally appears to be normal.  In the psychiatric examination the memory, orientation, attention, affect were observable and normal.  Joint examination of the DIPs, PIPs, MCPs, IP joints of the thumbs, wrists, elbows, for swelling, range of motion, for shoulders range of motion evaluated.  He does not have synovitis in any of the palpable joints of upper extremities or lower.  He has marked tenderness of the first CMC especially the left side reproducing his symptoms.  He does not have dactylitis of digits or toes.  He has mild impingement of the shoulders.  He has Dupuytren contractures bilaterally more prominent on the right side along the fifth digit.    LAB / IMAGING DATA:  No results found for: ALT, ALBUMIN, CREATININE    No results found for: WBC, HGB, PLT    No results found for: NUBIA, RF, SEDRATE

## 2021-07-08 ENCOUNTER — OFFICE VISIT (OUTPATIENT)
Dept: FAMILY MEDICINE | Facility: CLINIC | Age: 61
End: 2021-07-08
Payer: MEDICARE

## 2021-07-08 VITALS
DIASTOLIC BLOOD PRESSURE: 91 MMHG | HEIGHT: 69 IN | SYSTOLIC BLOOD PRESSURE: 142 MMHG | RESPIRATION RATE: 18 BRPM | WEIGHT: 155 LBS | BODY MASS INDEX: 22.96 KG/M2 | OXYGEN SATURATION: 99 % | HEART RATE: 71 BPM | TEMPERATURE: 98.6 F

## 2021-07-08 DIAGNOSIS — Z00.00 ENCOUNTER FOR PREVENTIVE HEALTH EXAMINATION: Primary | ICD-10-CM

## 2021-07-08 DIAGNOSIS — M19.041 PRIMARY OSTEOARTHRITIS OF BOTH HANDS: ICD-10-CM

## 2021-07-08 DIAGNOSIS — Z79.899 CONTROLLED SUBSTANCE AGREEMENT SIGNED: ICD-10-CM

## 2021-07-08 DIAGNOSIS — M19.042 PRIMARY OSTEOARTHRITIS OF BOTH HANDS: ICD-10-CM

## 2021-07-08 DIAGNOSIS — Z51.81 ENCOUNTER FOR THERAPEUTIC DRUG LEVEL MONITORING: ICD-10-CM

## 2021-07-08 DIAGNOSIS — R73.09 ELEVATED GLUCOSE: ICD-10-CM

## 2021-07-08 DIAGNOSIS — E78.5 HYPERLIPIDEMIA LDL GOAL <160: ICD-10-CM

## 2021-07-08 DIAGNOSIS — F90.0 ATTENTION DEFICIT HYPERACTIVITY DISORDER (ADHD), PREDOMINANTLY INATTENTIVE TYPE: ICD-10-CM

## 2021-07-08 LAB — HBA1C MFR BLD: 5.5 % (ref 0–5.6)

## 2021-07-08 PROCEDURE — 36415 COLL VENOUS BLD VENIPUNCTURE: CPT | Performed by: FAMILY MEDICINE

## 2021-07-08 PROCEDURE — 80061 LIPID PANEL: CPT | Performed by: FAMILY MEDICINE

## 2021-07-08 PROCEDURE — 83036 HEMOGLOBIN GLYCOSYLATED A1C: CPT | Performed by: FAMILY MEDICINE

## 2021-07-08 PROCEDURE — G0439 PPPS, SUBSEQ VISIT: HCPCS | Performed by: FAMILY MEDICINE

## 2021-07-08 PROCEDURE — 80053 COMPREHEN METABOLIC PANEL: CPT | Performed by: FAMILY MEDICINE

## 2021-07-08 PROCEDURE — 80307 DRUG TEST PRSMV CHEM ANLYZR: CPT | Performed by: FAMILY MEDICINE

## 2021-07-08 RX ORDER — ATORVASTATIN CALCIUM 40 MG/1
40 TABLET, FILM COATED ORAL DAILY
Qty: 90 TABLET | Refills: 3 | Status: SHIPPED | OUTPATIENT
Start: 2021-07-08 | End: 2022-06-27

## 2021-07-08 RX ORDER — METHYLPHENIDATE HYDROCHLORIDE 20 MG/1
TABLET ORAL
Qty: 150 TABLET | Refills: 0 | Status: SHIPPED | OUTPATIENT
Start: 2021-07-08 | End: 2021-10-06

## 2021-07-08 ASSESSMENT — ENCOUNTER SYMPTOMS
HEARTBURN: 0
JOINT SWELLING: 1
WEAKNESS: 0
ARTHRALGIAS: 1
NERVOUS/ANXIOUS: 0
DIZZINESS: 0
DIARRHEA: 0
HEMATURIA: 0
COUGH: 0
FEVER: 0
FREQUENCY: 0
DYSURIA: 0
PARESTHESIAS: 0
CHILLS: 0
CONSTIPATION: 0
HEADACHES: 0
HEMATOCHEZIA: 0
SHORTNESS OF BREATH: 0
NAUSEA: 0
EYE PAIN: 0
MYALGIAS: 0
SORE THROAT: 0
ABDOMINAL PAIN: 0
PALPITATIONS: 0

## 2021-07-08 ASSESSMENT — MIFFLIN-ST. JEOR: SCORE: 1493.46

## 2021-07-08 ASSESSMENT — ACTIVITIES OF DAILY LIVING (ADL): CURRENT_FUNCTION: NO ASSISTANCE NEEDED

## 2021-07-08 NOTE — LETTER
Opioid / Opioid Plus Controlled Substance Agreement    This is an agreement between you and your provider about the safe and appropriate use of controlled substance/opioids prescribed by your care team. Controlled substances are medicines that can cause physical and mental dependence (abuse).    There are strict laws about having and using these medicines. We here at [facility] are committing to working with you in your efforts to get better. To support you in this work, we ll help you schedule regular office appointments for medicine refills. If we must cancel or change your appointment for any reason, we ll make sure you have enough medicine to last until your next appointment.     As a Provider, I will:    Listen carefully to your concerns and treat you with respect.     Recommend a treatment plan that I believe is in your best interest. This plan may involve therapies other than opioid pain medication.     Talk with you often about the possible benefits, and the risk of harm of any medicine that we prescribe for you.     Provide a plan on how to taper (discontinue or go off) using this medicine if the decision is made to stop its use.    As a Patient, I understand that opioid(s):     Are a controlled substance prescribed by my care team to help me function or work and manage my condition(s).     Are strong medicines and can cause serious side effects such as:    Drowsiness, which can seriously affect my driving ability    A lower breathing rate, enough to cause death    Harm to my thinking ability     Depression     Abuse of and addiction to this medicine    Need to be taken exactly as prescribed. Combining opioids with certain medicines or chemicals (such as illegal drugs, sedatives, sleeping pills, and benzodiazepines) can be dangerous or even fatal. If I stop opioids suddenly, I may have severe withdrawal symptoms.    Do not work for all types of pain nor for all patients. If they re not helpful, I may  be asked to stop them.        The risks, benefits and side effects of these medicine(s) were explained to me. I agree that:  1. I will take part in other treatments as advised by my care team. This may be psychiatry or counseling, physical therapy, behavioral therapy, group treatment or a referral to a specialist.     2. I will keep all my appointments. I understand that this is part of the monitoring of opioids. My care team may require an office visit for EVERY opioid/controlled substance refill. If I miss appointments or don t follow instructions, my care team may stop my medicine.    3. I will take my medicines as prescribed. I will not change the dose or schedule unless my care team tells me to. There will be no refills if I run out early.     4. I may be asked to come to the clinic and complete a urine drug test or complete a pill count at any time. If I don t give a urine sample or participate in a pill count, the care team may stop my medicine.    5. I will only receive prescriptions from this clinic for chronic pain. If I am treated by another provider for acute pain issues, I will tell them that I am taking opioid pain medication for chronic pain and that I have a treatment agreement with this provider. I will inform my [facility] care team within one business day if I am given a prescription for any pain medication by another healthcare provider. My [facility] care team can contact other providers and pharmacists about my use of any medicines.    6. It is up to me to make sure that I don t run out of my medicines on weekends or holidays. If my care team is willing to refill my opioid prescription without a visit, I must request refills only during office hours. Refills may take up to 3 business days to process. I will use one pharmacy to fill all my opioid and other controlled substance prescriptions. I will notify the clinic about any changes to my insurance or medication  availability.    7. I am responsible for my prescriptions. If the medicine/prescription is lost, stolen or destroyed, it will not be replaced. I also agree not to share controlled substance medicines with anyone.    8. I am aware I should not use any illegal or recreational drugs. I agree not to drink alcohol unless my care team says I can.       9. If I enroll in the [place] [facility] program, I will tell my care team prior to my next refill.     10. I will tell my care team right away if I become pregnant, have a new medical problem treated outside of my regular clinic, or have a change in my medications.    11. I understand that this medicine can affect my thinking, judgment and reaction time. Alcohol and drugs affect the brain and body, which can affect the safety of my driving. Being under the influence of alcohol or drugs can affect my decision-making, behaviors, personal safety, and the safety of others. Driving while impaired (DWI) can occur if a person is driving, operating, or in physical control of a car, motorcycle, boat, snowmobile, ATV, motorbike, off-road vehicle, or any other motor vehicle ([place] Statute 169A.20). I understand the risk if I choose to drive or operate any vehicle or machinery.    I understand that if I do not follow any of the conditions above, my prescriptions or treatment may be stopped or changed.          Opioids  What You Need to Know    What are opioids?   Opioids are pain medicines that must be prescribed by a doctor. They are also known as narcotics.     Examples are:   1. morphine (MS Contin, Ankita)  2. oxycodone (Oxycontin)  3. oxycodone and acetaminophen (Percocet)  4. hydrocodone and acetaminophen (Vicodin, Norco)   5. fentanyl patch (Duragesic)   6. hydromorphone (Dilaudid)   7. methadone  8. codeine (Tylenol #3)     What do opioids do well?   Opioids are best for severe short-term pain such as after a surgery or injury. They may work well for cancer pain. They may  help some people with long-lasting (chronic) pain.     What do opioids NOT do well?   Opioids never get rid of pain entirely, and they don t work well for most patients with chronic pain. Opioids don t reduce swelling, one of the causes of pain.                                    Other ways to manage chronic pain and improve function include:       Treat the health problem that may be causing pain    Anti-inflammation medicines, which reduce swelling and tenderness, such as ibuprofen (Advil, Motrin) or naproxen (Aleve)    Acetaminophen (Tylenol)    Antidepressants and anti-seizure medicines, especially for nerve pain    Topical treatments such as patches or creams    Injections or nerve blocks    Chiropractic or osteopathic treatment    Acupuncture, massage, deep breathing, meditation, visual imagery, aromatherapy    Use heat or ice at the pain site    Physical therapy     Exercise    Stop smoking    Take part in therapy       Risks and side effects     Talk to your doctor before you start or decide to keep taking opioids. Possible side effects include:      Lowering your breathing rate enough to cause death    Overdose, including death, especially if taking higher than prescribed doses    Worse depression symptoms; less pleasure in things you usually enjoy    Feeling tired or sluggish    Slower thoughts or cloudy thinking    Being more sensitive to pain over time; pain is harder to control    Trouble sleeping or restless sleep    Changes in hormone levels (for example, less testosterone)    Changes in sex drive or ability to have sex    Constipation    Unsafe driving    Itching and sweating    Dizziness    Nausea, throwing up and dry mouth    What else should I know about opioids?    Opioids may lead to dependence, tolerance, or addiction.      Dependence means that if you stop or reduce the medicine too quickly, you will have withdrawal symptoms. These include loose poop (diarrhea), jitters, flu-like symptoms,  nervousness and tremors. Dependence is not the same as addiction.                       Tolerance means needing higher doses over time to get the same effect. This may increase the chance of serious side effects.      Addiction is when people improperly use a substance that harms their body, their mind or their relations with others. Use of opiates can cause a relapse of addiction if you have a history of drug or alcohol abuse.      People who have used opioids for a long time may have a lower quality of life, worse depression, higher levels of pain and more visits to doctors.    You can overdose on opioids. Take these steps to lower your risk of overdose:    1. Recognize the signs:  Signs of overdose include decrease or loss of consciousness (blackout), slowed breathing, trouble waking up and blue lips. If someone is worried about overdose, they should call 911.    2. Talk to your doctor about Narcan (naloxone).   If you are at risk for overdose, you may be given a prescription for Narcan. This medicine very quickly reverses the effects of opioids.   If you overdose, a friend or family member can give you Narcan while waiting for the ambulance. They need to know the signs of overdose and how to give Narcan.     3. Don't use alcohol or street drugs.   Taking them with opioids can cause death.    4. Do not take any of these medicines unless your doctor says it s OK. Taking these with opioids can cause death:    Benzodiazepines, such as lorazepam (Ativan), alprazolam (Xanax) or diazepam (Valium)    Muscle relaxers, such as cyclobenzaprine (Flexeril)    Sleeping pills like zolpidem (Ambien)     Other opioids      How to keep you and other people safe while taking opioids:    1. Never share your opioids with others.  Opioid medicines are regulated by the Drug Enforcement Agency (RONNA). Selling or sharing medications is a criminal act.    2. Be sure to store opioids in a secure place, locked up if possible. Young children  can easily swallow them and overdose.    3. When you are traveling with your medicines, keep them in the original bottles. If you use a pill box, be sure you also carry a copy of your medicine list from your clinic or pharmacy.    4. Safe disposal of opioids    Most pharmacies have places to get rid of medicine, called disposal kiosks. Medicine disposal options are also available in every [facility]. Search your county and  medication disposal  to find more options. You can find more details at:  [email or web address]     I agree that my provider, clinic care team, and pharmacy may work with any city, state or federal law enforcement agency that investigates the misuse, sale, or other diversion of my controlled medicine. I will allow my provider to discuss my care with, or share a copy of, this agreement with any other treating provider, pharmacy or emergency room where I receive care.    I have read this agreement and have asked questions about anything I did not understand.    _______________________________________________________  Patient Signature - Parveen Flor _____________________                   Date     _______________________________________________________  Provider Signature - Leon Moss MD   _____________________                   Date     _______________________________________________________  Witness Signature (required if provider not present while patient signing)   _____________________                   Date

## 2021-07-08 NOTE — PROGRESS NOTES
"SUBJECTIVE:   Parveen Flor is a 60 year old male who presents for Preventive Visit.      Patient has been advised of split billing requirements and indicates understanding: Yes   Are you in the first 12 months of your Medicare coverage?  No    Healthy Habits:     In general, how would you rate your overall health?  Fair    Frequency of exercise:  None    Do you usually eat at least 4 servings of fruit and vegetables a day, include whole grains    & fiber and avoid regularly eating high fat or \"junk\" foods?  Yes    Taking medications regularly:  Yes    Medication side effects:  None    Ability to successfully perform activities of daily living:  No assistance needed    Home Safety:  No safety concerns identified    Hearing Impairment:  Difficulty following a conversation in a noisy restaurant or crowded room and difficulty understanding speech on the telephone    In the past 6 months, have you been bothered by leaking of urine?  No    In general, how would you rate your overall mental or emotional health?  Good      PHQ-2 Total Score: 0    Additional concerns today:  No    Do you feel safe in your environment? Yes    Have you ever done Advance Care Planning? (For example, a Health Directive, POLST, or a discussion with a medical provider or your loved ones about your wishes): No, advance care planning information given to patient to review.  Patient declined advance care planning discussion at this time.       Fall risk  Fallen 2 or more times in the past year?: No  Any fall with injury in the past year?: No    Cognitive Screening   1) Repeat 3 items (Leader, Season, Table)    2) Clock draw: NORMAL  3) 3 item recall: Recalls 3 objects  Results: 3 items recalled: COGNITIVE IMPAIRMENT LESS LIKELY    Mini-CogTM Copyright TRISTAN Drake. Licensed by the author for use in [facility]; reprinted with permission (ignacio@.[email or web address]). All rights reserved.          Reviewed and updated as needed this visit by clinical " staff  Tobacco  Allergies    Med Hx  Surg Hx  Fam Hx  Soc Hx        Reviewed and updated as needed this visit by Provider                Social History     Tobacco Use     Smoking status: Former Smoker     Packs/day: 0.50     Years: 4.00     Pack years: 2.00     Types: Cigarettes     Quit date: 11/2017     Years since quitting: 3.6     Smokeless tobacco: Never Used   Substance Use Topics     Alcohol use: No         Alcohol Use 7/8/2021   Prescreen: >3 drinks/day or >7 drinks/week? Not Applicable   Prescreen: >3 drinks/day or >7 drinks/week? -               Current providers sharing in care for this patient include:   Patient Care Team:  Leon Moss MD as PCP - General (Family Practice)  Leon Moss MD as Assigned PCP  Kimberly Casarez MSW as Lead Care Coordinator (Primary Care - CC)    The following health maintenance items are reviewed in Epic and correct as of today:  There are no preventive care reminders to display for this patient.      Any new diagnosis of family breast, ovarian, or bowel cancer?     FHS-7: No flowsheet data found.      Pertinent mammograms are reviewed under the imaging tab.    Review of Systems   Constitutional: Negative for chills and fever.   HENT: Positive for hearing loss. Negative for congestion, ear pain and sore throat.    Eyes: Negative for pain and visual disturbance.   Respiratory: Negative for cough and shortness of breath.    Cardiovascular: Negative for chest pain, palpitations and peripheral edema.   Gastrointestinal: Negative for abdominal pain, constipation, diarrhea, heartburn, hematochezia and nausea.   Genitourinary: Negative for discharge, dysuria, frequency, genital sores, hematuria, impotence and urgency.   Musculoskeletal: Positive for arthralgias and joint swelling. Negative for myalgias.   Skin: Negative for rash.   Neurological: Negative for dizziness, weakness, headaches and paresthesias.   Psychiatric/Behavioral: Negative for mood changes. The patient is  "not nervous/anxious.          OBJECTIVE:   BP (!) 142/91 (BP Location: Right arm, Patient Position: Sitting, Cuff Size: Adult Regular)   Pulse 71   Temp 98.6  F (37  C) (Oral)   Resp 18   Ht 1.753 m (5' 9\")   Wt 70.3 kg (155 lb)   SpO2 99%   BMI 22.89 kg/m   Estimated body mass index is 22.89 kg/m  as calculated from the following:    Height as of this encounter: 1.753 m (5' 9\").    Weight as of this encounter: 70.3 kg (155 lb).  Physical Exam  Constitutional:       Appearance: Normal appearance.   HENT:      Head: Atraumatic.      Right Ear: Tympanic membrane normal.      Left Ear: Tympanic membrane normal.      Nose: Nose normal.      Mouth/Throat:      Mouth: Mucous membranes are moist.   Eyes:      Pupils: Pupils are equal, round, and reactive to light.   Neck:      Musculoskeletal: Neck supple.   Cardiovascular:      Rate and Rhythm: Normal rate and regular rhythm.      Heart sounds: Normal heart sounds.   Pulmonary:      Effort: Pulmonary effort is normal.      Breath sounds: Normal breath sounds.   Abdominal:      General: Abdomen is flat. Bowel sounds are normal.   Musculoskeletal:      Comments: Arthritic nodules without synovitis   Skin:     General: Skin is warm and dry.   Neurological:      General: No focal deficit present.      Mental Status: He is alert.   Psychiatric:         Mood and Affect: Mood normal.               ASSESSMENT / PLAN:     Problem List Items Addressed This Visit     Attention deficit hyperactivity disorder (ADHD), predominantly inattentive type     Stable.  CSA today         Relevant Medications    methylphenidate (RITALIN) 20 MG tablet    methylphenidate (RITALIN) 20 MG tablet    methylphenidate (RITALIN) 20 MG tablet    Other Relevant Orders    Drug Confirmation Panel Urine with Creat (Care Map) (Completed)    Controlled substance agreement signed     Today         Elevated glucose     Not diabetes nor prediabetes.  Periodic A1c         Relevant Orders    HEMOGLOBIN A1C " "(Completed)    Encounter for therapeutic drug level monitoring      Needs urine drug screen         Relevant Orders    Drug Confirmation Panel Urine with Creat (Care Map) (Completed)    Hyperlipidemia LDL goal <160     Statin therapy.  Continue         Relevant Medications    atorvastatin (LIPITOR) 40 MG tablet    Other Relevant Orders    COMPREHENSIVE METABOLIC PANEL (Completed)    Lipid panel reflex to direct LDL Non-fasting (Completed)    Primary osteoarthritis of both hands     Progressive.  Excellent response to splints for a few years.  Discussed.  No changes today           Other Visit Diagnoses     Encounter for preventive health examination    -  Primary          Patient has been advised of split billing requirements and indicates understanding: Yes  COUNSELING:  Reviewed preventive health counseling, as reflected in patient instructions    Estimated body mass index is 22.89 kg/m  as calculated from the following:    Height as of this encounter: 1.753 m (5' 9\").    Weight as of this encounter: 70.3 kg (155 lb).        He reports that he quit smoking about 3 years ago. His smoking use included cigarettes. He has a 2.00 pack-year smoking history. He has never used smokeless tobacco.      Appropriate preventive services were discussed with this patient, including applicable screening as appropriate for cardiovascular disease, diabetes, osteopenia/osteoporosis, and glaucoma.  As appropriate for age/gender, discussed screening for colorectal cancer, prostate cancer, breast cancer, and cervical cancer. Checklist reviewing preventive services available has been given to the patient.    Reviewed patients plan of care and provided an AVS. The Basic Care Plan (routine screening as documented in Health Maintenance) for Parveen meets the Care Plan requirement. This Care Plan has been established and reviewed with the Patient.    Counseling Resources:  ATP IV Guidelines  Pooled Cohorts Equation Calculator  Breast Cancer " Risk Calculator  Breast Cancer: Medication to Reduce Risk  FRAX Risk Assessment  ICSI Preventive Guidelines  Dietary Guidelines for Americans, 2010  [employer]'s MyPlate  ASA Prophylaxis  Lung CA Screening    Leon Moss MD  [facility]    Identified Health Risks:

## 2021-07-09 LAB
ALBUMIN SERPL-MCNC: 4.1 G/DL (ref 3.4–5)
ALP SERPL-CCNC: 84 U/L (ref 40–150)
ALT SERPL W P-5'-P-CCNC: 33 U/L (ref 0–70)
ANION GAP SERPL CALCULATED.3IONS-SCNC: 3 MMOL/L (ref 3–14)
AST SERPL W P-5'-P-CCNC: 23 U/L (ref 0–45)
BILIRUB SERPL-MCNC: 0.3 MG/DL (ref 0.2–1.3)
BUN SERPL-MCNC: 18 MG/DL (ref 7–30)
CALCIUM SERPL-MCNC: 9.5 MG/DL (ref 8.5–10.1)
CHLORIDE SERPL-SCNC: 106 MMOL/L (ref 94–109)
CHOLEST SERPL-MCNC: 176 MG/DL
CO2 SERPL-SCNC: 29 MMOL/L (ref 20–32)
CREAT SERPL-MCNC: 0.86 MG/DL (ref 0.66–1.25)
GFR SERPL CREATININE-BSD FRML MDRD: >90 ML/MIN/{1.73_M2}
GLUCOSE SERPL-MCNC: 100 MG/DL (ref 70–99)
HDLC SERPL-MCNC: 49 MG/DL
LDLC SERPL CALC-MCNC: 116 MG/DL
NONHDLC SERPL-MCNC: 127 MG/DL
POTASSIUM SERPL-SCNC: 5.1 MMOL/L (ref 3.4–5.3)
PROT SERPL-MCNC: 7.2 G/DL (ref 6.8–8.8)
SODIUM SERPL-SCNC: 138 MMOL/L (ref 133–144)
TRIGL SERPL-MCNC: 55 MG/DL

## 2021-07-11 LAB
CREAT UR-MCNC: 21 MG/DL
ME-PHENIDATE UR CFM-MCNC: 2000 NG/ML
ME-PHENIDATE/CREAT UR: 9524 NG/MG{CREAT}
RPT COMMENT: ABNORMAL

## 2021-07-12 ENCOUNTER — TELEPHONE (OUTPATIENT)
Dept: FAMILY MEDICINE | Facility: CLINIC | Age: 61
End: 2021-07-12

## 2021-07-12 NOTE — TELEPHONE ENCOUNTER
Attempted to reach Pt regarding lab results.  Msg left asking Pt to call clinic back for results.      Please transfer to [facility].     Sendy Garcia  A-EMT  Clinic Health Guide,

## 2021-07-13 NOTE — TELEPHONE ENCOUNTER
Pt calls, informed of normal lab results  Radha Huertas RN, BSN  Message handled by CLINIC NURSE.

## 2021-10-05 DIAGNOSIS — F90.0 ATTENTION DEFICIT HYPERACTIVITY DISORDER (ADHD), PREDOMINANTLY INATTENTIVE TYPE: Primary | ICD-10-CM

## 2021-10-06 RX ORDER — METHYLPHENIDATE HYDROCHLORIDE 20 MG/1
TABLET ORAL
Qty: 150 TABLET | Refills: 0 | Status: SHIPPED | OUTPATIENT
Start: 2021-10-06 | End: 2022-03-09

## 2021-10-06 RX ORDER — METHYLPHENIDATE HYDROCHLORIDE 20 MG/1
TABLET ORAL
Qty: 150 TABLET | Refills: 0 | OUTPATIENT
Start: 2021-10-06

## 2021-10-06 RX ORDER — METHYLPHENIDATE HYDROCHLORIDE 20 MG/1
TABLET ORAL
Qty: 150 TABLET | Refills: 0 | Status: SHIPPED | OUTPATIENT
Start: 2021-10-06 | End: 2022-02-04

## 2021-10-06 NOTE — TELEPHONE ENCOUNTER
Routing refill request to provider for review/approval because:  Drug not on the [facility] refill protocol     Hanh Thompson RN

## 2022-01-06 ENCOUNTER — TELEPHONE (OUTPATIENT)
Dept: FAMILY MEDICINE | Facility: CLINIC | Age: 62
End: 2022-01-06
Payer: MEDICARE

## 2022-01-06 DIAGNOSIS — F90.0 ATTENTION DEFICIT HYPERACTIVITY DISORDER (ADHD), PREDOMINANTLY INATTENTIVE TYPE: Primary | ICD-10-CM

## 2022-01-06 RX ORDER — METHYLPHENIDATE HYDROCHLORIDE 20 MG/1
TABLET ORAL
Qty: 150 TABLET | Refills: 0 | Status: SHIPPED | OUTPATIENT
Start: 2022-01-06 | End: 2022-03-09

## 2022-02-04 ENCOUNTER — TELEPHONE (OUTPATIENT)
Dept: FAMILY MEDICINE | Facility: CLINIC | Age: 62
End: 2022-02-04
Payer: MEDICARE

## 2022-02-04 DIAGNOSIS — F90.0 ATTENTION DEFICIT HYPERACTIVITY DISORDER (ADHD), PREDOMINANTLY INATTENTIVE TYPE: ICD-10-CM

## 2022-02-04 RX ORDER — METHYLPHENIDATE HYDROCHLORIDE 20 MG/1
TABLET ORAL
Qty: 150 TABLET | Refills: 0 | Status: CANCELLED | OUTPATIENT
Start: 2022-02-04

## 2022-02-04 RX ORDER — METHYLPHENIDATE HYDROCHLORIDE 20 MG/1
TABLET ORAL
Qty: 150 TABLET | Refills: 0 | OUTPATIENT
Start: 2022-02-04

## 2022-02-04 RX ORDER — METHYLPHENIDATE HYDROCHLORIDE 20 MG/1
TABLET ORAL
Qty: 150 TABLET | Refills: 0 | Status: SHIPPED | OUTPATIENT
Start: 2022-02-04 | End: 2022-06-05

## 2022-02-04 NOTE — TELEPHONE ENCOUNTER
Hello,     First fill of the new year was a transitional fill and now requires a prior authorization.    Please do not close this encounter until this has been addressed.  (prior auth approved/denied, prescriber refusal to complete prior auth or medication changed/discontinued)    Prior Authorization needed on: Methylphenidate 20mg  Drug NDC: [address]-67    Insurance: [employer]  Member ID: [id number]  Insurance phone #: [phone]    Pharmacy NPI: [id number]  Pharmacy Phone #: [phone]  Pharmacy Fax #: [phone]    Please let us know if the PA gets approved or denied or if medication is changed    Thank you,  Phyllis Pedraza & [facility] Staff Technician   [facility]  [email or web address]   Ph#: [phone]  Fax#: [phone]

## 2022-02-04 NOTE — TELEPHONE ENCOUNTER
Central Prior Authorization Team   Phone: [phone]    PA Initiation    Medication: methylphenidate  Insurance Company: [employer] - Phone [phone] Fax [phone]  Pharmacy Filling the Rx: [place], [place] - [address]  Filling Pharmacy Phone: [phone]  Filling Pharmacy Fax:    Start Date: 2/4/2022

## 2022-02-04 NOTE — TELEPHONE ENCOUNTER
Prior Authorization Approval    Authorization Effective Date: 1/1/2022  Authorization Expiration Date: 12/31/2022  Medication: methylphenidate  Approved Dose/Quantity:   Reference #:     Insurance Company: [employer] - Phone [phone] Fax [phone]  Expected CoPay:       CoPay Card Available:      Foundation Assistance Needed:    Which Pharmacy is filling the prescription (Not needed for infusion/clinic administered): [facility] [place], [place] - [address] [address] [facility]  Pharmacy Notified: Yes  Patient Notified: Yes

## 2022-02-04 NOTE — TELEPHONE ENCOUNTER
Routing refill request to provider for review/approval because:  Drug not on the [facility] refill protocol     Hnah Thompson RN

## 2022-03-09 ENCOUNTER — OFFICE VISIT (OUTPATIENT)
Dept: FAMILY MEDICINE | Facility: CLINIC | Age: 62
End: 2022-03-09
Payer: MEDICARE

## 2022-03-09 VITALS
DIASTOLIC BLOOD PRESSURE: 80 MMHG | BODY MASS INDEX: 23.39 KG/M2 | SYSTOLIC BLOOD PRESSURE: 140 MMHG | HEIGHT: 69 IN | HEART RATE: 76 BPM | OXYGEN SATURATION: 98 % | WEIGHT: 157.9 LBS | TEMPERATURE: 98.2 F

## 2022-03-09 DIAGNOSIS — F90.0 ATTENTION DEFICIT HYPERACTIVITY DISORDER (ADHD), PREDOMINANTLY INATTENTIVE TYPE: Primary | ICD-10-CM

## 2022-03-09 DIAGNOSIS — I10 BENIGN ESSENTIAL HYPERTENSION: ICD-10-CM

## 2022-03-09 DIAGNOSIS — Z87.891 PERSONAL HISTORY OF TOBACCO USE: ICD-10-CM

## 2022-03-09 DIAGNOSIS — Z12.11 SCREEN FOR COLON CANCER: ICD-10-CM

## 2022-03-09 DIAGNOSIS — Z12.11 SPECIAL SCREENING FOR MALIGNANT NEOPLASMS, COLON: ICD-10-CM

## 2022-03-09 PROCEDURE — 99214 OFFICE O/P EST MOD 30 MIN: CPT | Performed by: FAMILY MEDICINE

## 2022-03-09 RX ORDER — METHYLPHENIDATE HYDROCHLORIDE 20 MG/1
TABLET ORAL
Qty: 150 TABLET | Refills: 0 | Status: SHIPPED | OUTPATIENT
Start: 2022-03-09 | End: 2022-06-05

## 2022-03-09 RX ORDER — LISINOPRIL 10 MG/1
10 TABLET ORAL DAILY
Qty: 90 TABLET | Refills: 1 | Status: SHIPPED | OUTPATIENT
Start: 2022-03-09 | End: 2022-08-29

## 2022-03-09 NOTE — PROGRESS NOTES
Lung Cancer Screening Shared Decision Making Visit     Parveen Flor is not eligible for lung cancer screening on the basis of the information provided in my signed lung cancer screening order. Parveen's smoking history is below the threshold and so it is not recommended.    Patient is not currently a smoker and so we did not discuss that the only way to prevent lung cancer is to not smoke. Smoking cessation counseling was not given.    I did not offer risk estimation using a calculator such as this one:    ShouldIScreen    Leon Moss MD      Answers for HPI/ROS submitted by the patient on 3/9/2022  How many servings of fruits and vegetables do you eat daily?: 2-3  How many minutes a day do you exercise enough to make your heart beat faster?: 20 to 29  How many days per week do you miss taking your medication?: 0  What is the reason for your visit today?: Check up

## 2022-03-09 NOTE — PROGRESS NOTES
Assessment & Plan   Problem List Items Addressed This Visit     Attention deficit hyperactivity disorder (ADHD), predominantly inattentive type - Primary     Stable therapy.         Relevant Medications    methylphenidate (RITALIN) 20 MG tablet    methylphenidate (RITALIN) 20 MG tablet    methylphenidate (RITALIN) 20 MG tablet    Benign essential hypertension     Diagnosed today.  Treat         Relevant Medications    lisinopril (ZESTRIL) 10 MG tablet    Personal history of tobacco use     Exposure is not adequate for CT screening         Relevant Medications    methylphenidate (RITALIN) 20 MG tablet    methylphenidate (RITALIN) 20 MG tablet    methylphenidate (RITALIN) 20 MG tablet    Other Relevant Orders    Prof Fee: Shared Decision Making Visit for Lung Cancer Screening (Completed)    Screen for colon cancer     He is due           Other Visit Diagnoses     Special screening for malignant neoplasms, colon        Relevant Orders    Fecal colorectal cancer screen (FIT)                        Return in about 4 months (around 7/9/2022) for appt, Physical Exam, to lab, go.    Leon Moss MD  [facility]   Parveen is a 61 year old who presents for the following health issues     ADHD Follow-Up (Adult)  Concerns: None   Changes since last visit: Stable  Taking controlled (daily) medications as prescribed: Yes  Sleep: no problems  Adult ADHD Self-Reporting form given to patient?:  No  Currently in counseling: No    Medication Benefits:   Controlled symptoms: Hyperactivity - motor restlessness, Attention span and Distractability  Uncontrolled symptoms:  None    Medication Side Effects:  Reports:  none  Sleep Problems? no  ++++++++++++++++++++++++++++++++++++++++++++++++              Review of Systems   He has no complaint      Objective    BP (!) 140/80 (BP Location: Right arm, Patient Position: Sitting, Cuff Size: Adult Regular)   Pulse 76   Temp 98.2  F (36.8  C) (Oral)   " Ht 1.753 m (5' 9\")   Wt 71.6 kg (157 lb 14.4 oz)   SpO2 98%   BMI 23.32 kg/m    Body mass index is 23.32 kg/m .  Physical Exam   He is cheerful.  Hyperactive today  Speech is pressured        Leon Moss MD              "

## 2022-03-10 PROBLEM — Z12.11 SCREEN FOR COLON CANCER: Status: ACTIVE | Noted: 2022-03-10

## 2022-03-10 PROBLEM — Z87.891 PERSONAL HISTORY OF TOBACCO USE: Status: ACTIVE | Noted: 2022-03-10

## 2022-06-03 DIAGNOSIS — F90.0 ATTENTION DEFICIT HYPERACTIVITY DISORDER (ADHD), PREDOMINANTLY INATTENTIVE TYPE: Primary | ICD-10-CM

## 2022-06-05 RX ORDER — METHYLPHENIDATE HYDROCHLORIDE 20 MG/1
TABLET ORAL
Qty: 150 TABLET | Refills: 0 | Status: SHIPPED | OUTPATIENT
Start: 2022-06-05 | End: 2023-05-19

## 2022-06-05 RX ORDER — METHYLPHENIDATE HYDROCHLORIDE 20 MG/1
TABLET ORAL
Qty: 150 TABLET | Refills: 0 | Status: SHIPPED | OUTPATIENT
Start: 2022-06-05 | End: 2022-10-05

## 2022-06-05 RX ORDER — METHYLPHENIDATE HYDROCHLORIDE 20 MG/1
TABLET ORAL
Qty: 150 TABLET | Refills: 0 | Status: SHIPPED | OUTPATIENT
Start: 2022-06-05 | End: 2023-11-01

## 2022-06-05 RX ORDER — METHYLPHENIDATE HYDROCHLORIDE 20 MG/1
TABLET ORAL
Qty: 150 TABLET | Refills: 0 | OUTPATIENT
Start: 2022-06-05

## 2022-06-26 DIAGNOSIS — E78.5 HYPERLIPIDEMIA LDL GOAL <160: ICD-10-CM

## 2022-06-27 RX ORDER — ATORVASTATIN CALCIUM 40 MG/1
TABLET, FILM COATED ORAL
Qty: 90 TABLET | Refills: 0 | Status: SHIPPED | OUTPATIENT
Start: 2022-06-27 | End: 2022-09-22

## 2022-06-27 NOTE — TELEPHONE ENCOUNTER
Prescription approved per [facility] Refill Protocol.  Erasmo WASSERMAN RN

## 2022-08-28 DIAGNOSIS — I10 BENIGN ESSENTIAL HYPERTENSION: ICD-10-CM

## 2022-08-29 RX ORDER — LISINOPRIL 10 MG/1
10 TABLET ORAL DAILY
Qty: 90 TABLET | Refills: 0 | Status: SHIPPED | OUTPATIENT
Start: 2022-08-29 | End: 2022-10-05

## 2022-08-29 NOTE — TELEPHONE ENCOUNTER
Medication is being filled for 1 time refill only due to:  Patient needs to be seen because patient due for labs and follow-up.

## 2022-09-02 ENCOUNTER — ALLIED HEALTH/NURSE VISIT (OUTPATIENT)
Dept: FAMILY MEDICINE | Facility: CLINIC | Age: 62
End: 2022-09-02
Payer: MEDICARE

## 2022-09-02 VITALS — SYSTOLIC BLOOD PRESSURE: 114 MMHG | DIASTOLIC BLOOD PRESSURE: 80 MMHG

## 2022-09-02 DIAGNOSIS — Z01.30 BP CHECK: Primary | ICD-10-CM

## 2022-09-02 PROCEDURE — 99207 PR NO CHARGE NURSE ONLY: CPT | Performed by: FAMILY MEDICINE

## 2022-09-02 NOTE — PROGRESS NOTES
Parveen Flor was evaluated at [facility] on September 2, 2022 at which time his blood pressure was:    BP Readings from Last 3 Encounters:   09/02/22 114/80   03/09/22 (!) 140/80   07/08/21 (!) 142/91     Pulse Readings from Last 3 Encounters:   03/09/22 76   07/08/21 71   02/23/21 80       Reviewed lifestyle modifications for blood pressure control and reduction: including making healthy food choices, managing weight, getting regular exercise, smoking cessation, reducing alcohol consumption, monitoring blood pressure regularly.     Symptoms: None    BP Goal:< 140/90 mmHg    BP Assessment:  BP at goal    Potential Reasons for BP too high: NA - Not applicable    BP Follow-Up Plan: Recheck BP in 6 months at pharmacy    Recommendation to Provider: n/a    Note completed by:   Thank you,   Eileen Buitrago, PharmD  [facility] [place]

## 2022-09-21 DIAGNOSIS — E78.5 HYPERLIPIDEMIA LDL GOAL <160: ICD-10-CM

## 2022-09-21 NOTE — LETTER
September 26, 2022      Parveen Flor  [id number]  [facility] [place] [facility] [address]      Dear Parveen,    We recently received a call from your pharmacy requesting a refill of your medication.    A review of your chart indicates that an appointment is required with your provider.  Please call the clinic to schedule your appointment.    We have authorized one refill of your medication to allow time for you to schedule.   If you have a history of diabetes or high cholesterol, please come in fasting for the appointment. Fasting entails nothing to eat or drink 8 hours prior to your appointment; with the exception on water. You may take your medication the day of the appointment.    Thank you,      Leon Moss MD

## 2022-09-22 RX ORDER — ATORVASTATIN CALCIUM 40 MG/1
TABLET, FILM COATED ORAL
Qty: 30 TABLET | Refills: 0 | Status: SHIPPED | OUTPATIENT
Start: 2022-09-22 | End: 2022-10-05

## 2022-09-22 NOTE — TELEPHONE ENCOUNTER
Please call patient. He is overdue for follow-up with primary care provider. Please schedule. Sent 30 days.    Margarito Vicente PA-C on 9/22/2022 at 2:09 PM (covering for Dr. Moss)

## 2022-09-22 NOTE — TELEPHONE ENCOUNTER
Routing refill request to provider for review/approval because:  Labs out of range:  LDL  Labs not current:  LDL  LDL Cholesterol Calculated   Date Value Ref Range Status   07/08/2021 116 (H) <100 mg/dL Final     Comment:     Above desirable:  100-129 mg/dl  Borderline High:  130-159 mg/dL  High:             160-189 mg/dL  Very high:       >189 mg/dl         Vandana Lombardi RN

## 2022-10-03 DIAGNOSIS — F90.0 ATTENTION DEFICIT HYPERACTIVITY DISORDER (ADHD), PREDOMINANTLY INATTENTIVE TYPE: Primary | ICD-10-CM

## 2022-10-03 RX ORDER — METHYLPHENIDATE HYDROCHLORIDE 20 MG/1
TABLET ORAL
Qty: 150 TABLET | Refills: 0 | Status: SHIPPED | OUTPATIENT
Start: 2022-10-03 | End: 2022-10-05

## 2022-10-05 ENCOUNTER — OFFICE VISIT (OUTPATIENT)
Dept: FAMILY MEDICINE | Facility: CLINIC | Age: 62
End: 2022-10-05
Payer: MEDICARE

## 2022-10-05 VITALS
DIASTOLIC BLOOD PRESSURE: 76 MMHG | BODY MASS INDEX: 24.37 KG/M2 | TEMPERATURE: 98.5 F | SYSTOLIC BLOOD PRESSURE: 110 MMHG | OXYGEN SATURATION: 98 % | RESPIRATION RATE: 14 BRPM | WEIGHT: 165 LBS | HEART RATE: 82 BPM

## 2022-10-05 DIAGNOSIS — I10 BENIGN ESSENTIAL HYPERTENSION: ICD-10-CM

## 2022-10-05 DIAGNOSIS — M18.0 OSTEOARTHRITIS OF THUMBS, BILATERAL: ICD-10-CM

## 2022-10-05 DIAGNOSIS — Z79.899 CONTROLLED SUBSTANCE AGREEMENT SIGNED: ICD-10-CM

## 2022-10-05 DIAGNOSIS — Z12.11 SCREEN FOR COLON CANCER: ICD-10-CM

## 2022-10-05 DIAGNOSIS — Z23 NEED FOR PROPHYLACTIC VACCINATION AND INOCULATION AGAINST INFLUENZA: ICD-10-CM

## 2022-10-05 DIAGNOSIS — M54.50 MIDLINE LOW BACK PAIN WITHOUT SCIATICA, UNSPECIFIED CHRONICITY: ICD-10-CM

## 2022-10-05 DIAGNOSIS — R73.09 ELEVATED GLUCOSE: ICD-10-CM

## 2022-10-05 DIAGNOSIS — Z23 ENCOUNTER FOR IMMUNIZATION: ICD-10-CM

## 2022-10-05 DIAGNOSIS — E78.5 HYPERLIPIDEMIA LDL GOAL <160: ICD-10-CM

## 2022-10-05 DIAGNOSIS — M72.0 DUPUYTREN'S CONTRACTURE OF BOTH HANDS: ICD-10-CM

## 2022-10-05 DIAGNOSIS — F90.0 ATTENTION DEFICIT HYPERACTIVITY DISORDER (ADHD), PREDOMINANTLY INATTENTIVE TYPE: Primary | ICD-10-CM

## 2022-10-05 PROBLEM — Z12.5 SCREENING FOR PROSTATE CANCER: Status: ACTIVE | Noted: 2022-10-05

## 2022-10-05 LAB
ALBUMIN SERPL-MCNC: 3.9 G/DL (ref 3.4–5)
ALP SERPL-CCNC: 88 U/L (ref 40–150)
ALT SERPL W P-5'-P-CCNC: 31 U/L (ref 0–70)
ANION GAP SERPL CALCULATED.3IONS-SCNC: 6 MMOL/L (ref 3–14)
AST SERPL W P-5'-P-CCNC: 15 U/L (ref 0–45)
BILIRUB SERPL-MCNC: 0.2 MG/DL (ref 0.2–1.3)
BUN SERPL-MCNC: 22 MG/DL (ref 7–30)
CALCIUM SERPL-MCNC: 9.1 MG/DL (ref 8.5–10.1)
CHLORIDE BLD-SCNC: 109 MMOL/L (ref 94–109)
CHOLEST SERPL-MCNC: 175 MG/DL
CO2 SERPL-SCNC: 26 MMOL/L (ref 20–32)
CREAT SERPL-MCNC: 0.73 MG/DL (ref 0.66–1.25)
CREAT UR-MCNC: 104 MG/DL
FASTING STATUS PATIENT QL REPORTED: ABNORMAL
GFR SERPL CREATININE-BSD FRML MDRD: >90 ML/MIN/1.73M2
GLUCOSE BLD-MCNC: 103 MG/DL (ref 70–99)
HBA1C MFR BLD: 5.6 % (ref 0–5.6)
HDLC SERPL-MCNC: 49 MG/DL
LDLC SERPL CALC-MCNC: 107 MG/DL
NONHDLC SERPL-MCNC: 126 MG/DL
POTASSIUM BLD-SCNC: 4.7 MMOL/L (ref 3.4–5.3)
PROT SERPL-MCNC: 7.1 G/DL (ref 6.8–8.8)
SODIUM SERPL-SCNC: 141 MMOL/L (ref 133–144)
TRIGL SERPL-MCNC: 97 MG/DL

## 2022-10-05 PROCEDURE — 80307 DRUG TEST PRSMV CHEM ANLYZR: CPT | Performed by: FAMILY MEDICINE

## 2022-10-05 PROCEDURE — 80053 COMPREHEN METABOLIC PANEL: CPT | Performed by: FAMILY MEDICINE

## 2022-10-05 PROCEDURE — G0008 ADMIN INFLUENZA VIRUS VAC: HCPCS | Performed by: FAMILY MEDICINE

## 2022-10-05 PROCEDURE — 36415 COLL VENOUS BLD VENIPUNCTURE: CPT | Performed by: FAMILY MEDICINE

## 2022-10-05 PROCEDURE — 0124A COVID-19,PF,PFIZER BOOSTER BIVALENT: CPT | Performed by: FAMILY MEDICINE

## 2022-10-05 PROCEDURE — 91312 COVID-19,PF,PFIZER BOOSTER BIVALENT: CPT | Performed by: FAMILY MEDICINE

## 2022-10-05 PROCEDURE — 83036 HEMOGLOBIN GLYCOSYLATED A1C: CPT | Performed by: FAMILY MEDICINE

## 2022-10-05 PROCEDURE — 80061 LIPID PANEL: CPT | Performed by: FAMILY MEDICINE

## 2022-10-05 PROCEDURE — 90682 RIV4 VACC RECOMBINANT DNA IM: CPT | Performed by: FAMILY MEDICINE

## 2022-10-05 PROCEDURE — 99214 OFFICE O/P EST MOD 30 MIN: CPT | Mod: 25 | Performed by: FAMILY MEDICINE

## 2022-10-05 RX ORDER — LISINOPRIL 10 MG/1
10 TABLET ORAL DAILY
Qty: 90 TABLET | Refills: 3 | Status: SHIPPED | OUTPATIENT
Start: 2022-10-05 | End: 2023-11-01

## 2022-10-05 RX ORDER — METHYLPHENIDATE HYDROCHLORIDE 20 MG/1
TABLET ORAL
Qty: 150 TABLET | Refills: 0 | Status: SHIPPED | OUTPATIENT
Start: 2022-10-05 | End: 2023-05-19

## 2022-10-05 RX ORDER — ATORVASTATIN CALCIUM 40 MG/1
40 TABLET, FILM COATED ORAL DAILY
Qty: 90 TABLET | Refills: 3 | Status: SHIPPED | OUTPATIENT
Start: 2022-10-05 | End: 2023-11-01

## 2022-10-05 NOTE — LETTER
October 10, 2022      Parveen DUNNE Chacho  [id number]  [place] [address]        Dear ,    We are writing to inform you of your test results.    Tests are all quite stable      Resulted Orders   HEMOGLOBIN A1C   Result Value Ref Range    Hemoglobin A1C 5.6 0.0 - 5.6 %      Comment:      Normal <5.7%   Prediabetes 5.7-6.4%    Diabetes 6.5% or higher     Note: Adopted from ADA consensus guidelines.   COMPREHENSIVE METABOLIC PANEL   Result Value Ref Range    Sodium 141 133 - 144 mmol/L    Potassium 4.7 3.4 - 5.3 mmol/L    Chloride 109 94 - 109 mmol/L    Carbon Dioxide (CO2) 26 20 - 32 mmol/L    Anion Gap 6 3 - 14 mmol/L    Urea Nitrogen 22 7 - 30 mg/dL    Creatinine 0.73 0.66 - 1.25 mg/dL    Calcium 9.1 8.5 - 10.1 mg/dL    Glucose 103 (H) 70 - 99 mg/dL    Alkaline Phosphatase 88 40 - 150 U/L    AST 15 0 - 45 U/L    ALT 31 0 - 70 U/L    Protein Total 7.1 6.8 - 8.8 g/dL    Albumin 3.9 3.4 - 5.0 g/dL    Bilirubin Total 0.2 0.2 - 1.3 mg/dL    GFR Estimate >90 >60 mL/min/1.73m2      Comment:      Effective December 21, 2021 eGFRcr in adults is calculated using the 2021 CKD-EPI creatinine equation which includes age and gender (Piyush reardon al., NEJM, DOI: 10.1056/YWLPci3522552)   Lipid panel reflex to direct LDL Non-fasting   Result Value Ref Range    Cholesterol 175 <200 mg/dL    Triglycerides 97 <150 mg/dL    Direct Measure HDL 49 >=40 mg/dL    LDL Cholesterol Calculated 107 (H) <=100 mg/dL    Non HDL Cholesterol 126 <130 mg/dL    Patient Fasting > 8hrs? Unknown     Narrative    Cholesterol  Desirable:  <200 mg/dL    Triglycerides  Normal:  Less than 150 mg/dL  Borderline High:  150-199 mg/dL  High:  200-499 mg/dL  Very High:  Greater than or equal to 500 mg/dL    Direct Measure HDL  Female:  Greater than or equal to 50 mg/dL   Male:  Greater than or equal to 40 mg/dL    LDL Cholesterol  Desirable:  <100mg/dL  Above Desirable:  100-129 mg/dL   Borderline High:  130-159 mg/dL   High:  160-189 mg/dL    Very High:  >= 190 mg/dL    Non HDL Cholesterol  Desirable:  130 mg/dL  Above Desirable:  130-159 mg/dL  Borderline High:  160-189 mg/dL  High:  190-219 mg/dL  Very High:  Greater than or equal to 220 mg/dL   Urine Drug Confirmation Panel   Result Value Ref Range    Methylphenidate (Ritalin) ng/mL 115 (H) <50 ng/mL    Methylphenidate (Ritalin) 111 Absent ng/mg [creat]      Comment:      Sources of methylphenidate are scheduled prescription medications.    Ritalinic Acid ng/mL >12,540 (H) <50 ng/mL    Narrative    This test was developed and its performance characteristics determined by the [facility],  Special Chemistry Laboratory. It has not been cleared or approved by the FDA. The laboratory is regulated under CLIA as qualified to perform high-complexity testing. This test is used for clinical purposes. It should not be regarded as investigational or for research.   Urine Creatinine for Drug Screen Panel   Result Value Ref Range    Creatinine Urine for Drug Screen 104 mg/dL      Comment:      The reference range has not been established for creatinine in random urines. The results should be integrated into the clinical context for interpretation.       If you have any questions or concerns, please call the clinic at the number listed above.       Sincerely,      Leon Moss MD

## 2022-10-05 NOTE — ASSESSMENT & PLAN NOTE
He is no longer able to extend his medial fingers to neutral.  Discussed various interventions.  Refer

## 2022-10-05 NOTE — ASSESSMENT & PLAN NOTE
Episodic.  Exam reassuring.  Discussed various interventions.  He has stretching exercises that he has been taught in the past, he does not been doing.  He proposes to do those faithfully first

## 2022-10-05 NOTE — ASSESSMENT & PLAN NOTE
Room for screening.  He is a candidate for FIT testing.  He has been remiss in returning it.  We shall give him another.  Discussed various options

## 2022-10-05 NOTE — PROGRESS NOTES
Assessment & Plan   Problem List Items Addressed This Visit     Attention deficit hyperactivity disorder (ADHD), predominantly inattentive type - Primary     Stable.  He remains scattered           Relevant Medications    methylphenidate (RITALIN) 20 MG tablet    methylphenidate (RITALIN) 20 MG tablet    methylphenidate (RITALIN) 20 MG tablet    Other Relevant Orders    [id number] - Urine Drug Confirmation Panel (Comprehensive)    Benign essential hypertension     Controlled.  Check twice yearly           Relevant Medications    lisinopril (ZESTRIL) 10 MG tablet    Other Relevant Orders    COMPREHENSIVE METABOLIC PANEL    Controlled substance agreement signed     Anew today           Relevant Orders    [id number] - Urine Drug Confirmation Panel (Comprehensive)    Dupuytren's contracture of both hands     He is no longer able to extend his medial fingers to neutral.  Discussed various interventions.  Refer           Relevant Orders    Orthopedic  Referral    Elevated glucose     Periodic A1c           Relevant Orders    HEMOGLOBIN A1C (Completed)    Encounter for immunization     Discussed recommended immunizations           Relevant Orders    COVID-19,PF,PFIZER BOOSTER BIVALENT (12+YRS) (Completed)    Hyperlipidemia LDL goal <160     Statin therapy.  Continue           Relevant Medications    atorvastatin (LIPITOR) 40 MG tablet    Other Relevant Orders    COMPREHENSIVE METABOLIC PANEL    Lipid panel reflex to direct LDL Non-fasting    Midline low back pain without sciatica     Episodic.  Exam reassuring.  Discussed various interventions.  He has stretching exercises that he has been taught in the past, he does not been doing.  He proposes to do those faithfully first           Need for prophylactic vaccination and inoculation against influenza     Offered           Relevant Orders    INFLUENZA QUAD, RECOMBINANT, P-FREE (RIV4) (FLUBLOK) (Completed)    Osteoarthritis of thumbs, bilateral     He is already went to  hand therapy, as custom braces.  Discussed additional options.  He would like a surgical evaluation           Relevant Orders    Orthopedic  Referral    Screen for colon cancer     Room for screening.  He is a candidate for FIT testing.  He has been remiss in returning it.  We shall give him another.  Discussed various options           Relevant Orders    Fecal colorectal cancer screen (FIT)                        Return for bivalnt booster flu lab go.   Follow-up Visit   Expected date:  Mar 05, 2023 (Approximate)      Follow Up Appointment Details:     Follow-up with whom?: Me    Follow-Up for what?: Chronic Disease f/u    Chronic Disease f/u: General (Other)    Additional Details: ADD    How?: In Person    Is this an as-needed follow-up?: No                    Leon Moss MD  [facility]    Joby Saini is a 61 year old, presenting for the following health issues:  Recheck Medication and Imm/Inj (Flu Shot)      History of Present Illness       Hypertension: He presents for follow up of hypertension.  He does not check blood pressure  regularly outside of the clinic. Outpatient blood pressures have not been over 140/90. He does not follow a low salt diet.     Reason for visit:  Med check    He eats 2-3 servings of fruits and vegetables daily.He consumes 1 sweetened beverage(s) daily.He exercises with enough effort to increase his heart rate 30 to 60 minutes per day.  He exercises with enough effort to increase his heart rate 4 days per week.   He is taking medications regularly.             Review of Systems   As described      Objective    /76 (BP Location: Right arm, Patient Position: Chair, Cuff Size: Adult Regular)   Pulse 82   Temp 98.5  F (36.9  C) (Oral)   Resp 14   Wt 74.8 kg (165 lb)   SpO2 98%   BMI 24.37 kg/m    Body mass index is 24.37 kg/m .  Physical Exam     BACK: Able to flex within1 inches of the floor, normal heel toe, negative SLR.  Hands  as described    Leon Moss MD

## 2022-10-05 NOTE — LETTER
[facility]  10/05/22  Patient: Parveen Flor  YOB: 1960  Medical Record Number: [id number]                                                                                  Non-Opioid Controlled Substance Agreement    This is an agreement between you and your provider regarding safe and appropriate use of controlled substances prescribed by your care team. Controlled substances are?medicines that can cause physical and mental dependence (abuse).     There are strict laws about having and using these medicines. We here at [facility] are  committed to working with you in your efforts to get better. To support you in this work, we'll help you schedule regular office appointments for medicine refills. If we must cancel or change your appointment for any reason, we'll make sure you have enough medicine to last until your next appointment.     As a Provider, I will:     Listen carefully to your concerns while treating you with respect.     Recommend a treatment plan that I believe is in your best interest and may involve therapies other than medicine.      Talk with you often about the possible benefits and the risk of harm of any medicine that we prescribe for you.    Assess the safety of this medicine and check how well it works.      Provide a plan on how to taper (discontinue or go off) using this medicine if the decision is made to stop its use.      ::  As a Patient, I understand controlled substances:       Are prescribed by my care provider to help me function or work and manage my condition(s).?    Are strong medicines and can cause serious side effects.       Need to be taken exactly as prescribed.?Combining controlled substances with certain medicines or chemicals (such as illegal drugs, alcohol, sedatives, sleeping pills, and benzodiazepines) can be dangerous or even fatal.? If I stop taking my medicines suddenly, I may have severe withdrawal symptoms.      The risks, benefits, and side effects of these medicine(s) were explained to me. I agree that:    1. I will take part in other treatments as advised by my care team. This may be psychiatry or counseling, physical therapy, behavioral therapy, group treatment or a referral to specialist.    2. I will keep all my appointments and understand this is part of the monitoring of controlled substances.?My care team may require an office visit for EVERY controlled substance refill. If I miss appointments or don t follow instructions, my care team may stop my medicine    3. I will take my medicines as prescribed. I will not change the dose or schedule unless my care team tells me to. There will be no refills if I run out early.      4. I may be asked to come to the clinic and complete a urine drug test or complete a pill count. If I don t give a urine sample or participate in a pill count, the care team may stop my medicine.    5. I will only receive controlled substance prescriptions from this clinic. If I am treated by another provider, I will tell them that I am taking controlled substances and that I have a treatment agreement with this provider. I will inform my [facility] care team within one business day if I am given a prescription for any controlled substance by another healthcare provider. My [facility] care team can contact other providers and pharmacists about my use of any medicines.    6. It is up to me to make sure that I don't run out of my medicines on weekends or holidays.?If my care team is willing to refill my prescription without a visit, I must request refills only during office hours. Refills may take up to 3 business days to process. I will use one pharmacy to fill all my controlled substance prescriptions. I will notify the clinic about any changes to my insurance or medicine availability.    7. I am responsible for my prescriptions. If the medicine/prescription is lost, stolen or  destroyed, it will not be replaced.?I also agree not to share controlled substance medicines with anyone.     8. I am aware I should not use any illegal or recreational drugs. I agree not to drink alcohol unless my care team says I can.     9. If I enroll in the [place] Medical Cannabis program, I will tell my care team before my next refill.    10. I will tell my care team right away if I become pregnant, have a new medical problem treated outside of my regular clinic, or have a change in my medicines.     11. I understand that this medicine can affect my thinking, judgment and reaction time.? Alcohol and drugs affect the brain and body, which can affect the safety of my driving. Being under the influence of alcohol or drugs can affect my decision-making, behaviors, personal safety and the safety of others. Driving while impaired (DWI) can occur if a person is driving, operating or in physical control of a car, motorcycle, boat, snowmobile, ATV, motorbike, off-road vehicle or any other motor vehicle ([place] Statute 169A.20). I understand the risk if I choose to drive or operate any vehicle or machinery.    I understand that if I do not follow any of the conditions above, my prescriptions or treatment may be stopped or changed.   I agree that my provider, clinic care team and pharmacy may work with any city, state or federal law enforcement agency that investigates the misuse, sale or other diversion of my controlled medicine. I will allow my provider to discuss my care with, or share a copy of, this agreement with any other treating provider, pharmacy or emergency room where I receive care.     I have read this agreement and have asked questions about anything I did not understand.    ________________________________________________________  Patient Signature - Parveen Flor     ___________________                   Date     ________________________________________________________  Provider Signature - Leon  JESSICA Moss, MD       ___________________                   Date     ________________________________________________________  Witness Signature (required if provider not present while patient signing)          ___________________                   Date

## 2022-10-05 NOTE — ASSESSMENT & PLAN NOTE
He is already went to hand therapy, as custom braces.  Discussed additional options.  He would like a surgical evaluation

## 2022-10-09 LAB
ME-PHENIDATE UR CFM-MCNC: 115 NG/ML
ME-PHENIDATE UR CFM-MCNC: ABNORMAL NG/ML
ME-PHENIDATE/CREAT UR: 111 NG/MG {CREAT}

## 2023-01-31 DIAGNOSIS — F90.0 ATTENTION DEFICIT HYPERACTIVITY DISORDER (ADHD), PREDOMINANTLY INATTENTIVE TYPE: Primary | ICD-10-CM

## 2023-01-31 RX ORDER — METHYLPHENIDATE HYDROCHLORIDE 20 MG/1
TABLET ORAL
Qty: 150 TABLET | Refills: 0 | Status: SHIPPED | OUTPATIENT
Start: 2023-01-31 | End: 2023-02-27

## 2023-01-31 NOTE — TELEPHONE ENCOUNTER
reviewed. Refilled.    Margarito Vicente PA-C on 1/31/2023 at 11:32 AM (covering for Dr. Moss)

## 2023-02-27 DIAGNOSIS — F90.0 ATTENTION DEFICIT HYPERACTIVITY DISORDER (ADHD), PREDOMINANTLY INATTENTIVE TYPE: ICD-10-CM

## 2023-02-27 RX ORDER — METHYLPHENIDATE HYDROCHLORIDE 20 MG/1
TABLET ORAL
Qty: 150 TABLET | Refills: 0 | Status: SHIPPED | OUTPATIENT
Start: 2023-03-01 | End: 2023-03-31

## 2023-02-27 NOTE — TELEPHONE ENCOUNTER
reviewed. Refilled. Please call patient to offer to schedule visit in March with primary care provider which he is due for.     Margarito Vicente PA-C on 2/27/2023 at 3:16 PM (covering for Dr. Moss)

## 2023-03-30 DIAGNOSIS — F90.0 ATTENTION DEFICIT HYPERACTIVITY DISORDER (ADHD), PREDOMINANTLY INATTENTIVE TYPE: ICD-10-CM

## 2023-03-30 NOTE — LETTER
April 3, 2023      Parveen Flor  [address]  [place] [address]      Dear Parveen,    We recently received a call from your pharmacy requesting a refill of your medication.    A review of your chart indicates that an appointment is required with your provider.  Please call the clinic to schedule your appointment.    We have authorized one refill of your medication to allow time for you to schedule.   If you have a history of diabetes or high cholesterol, please come in fasting for the appointment. Fasting entails nothing to eat or drink 8 hours prior to your appointment; with the exception on water. You may take your medication the day of the appointment.    Thank you,    Leon Moss MD

## 2023-03-31 RX ORDER — METHYLPHENIDATE HYDROCHLORIDE 20 MG/1
TABLET ORAL
Qty: 150 TABLET | Refills: 0 | Status: SHIPPED | OUTPATIENT
Start: 2023-03-31 | End: 2023-05-01

## 2023-05-01 DIAGNOSIS — F90.0 ATTENTION DEFICIT HYPERACTIVITY DISORDER (ADHD), PREDOMINANTLY INATTENTIVE TYPE: ICD-10-CM

## 2023-05-01 RX ORDER — METHYLPHENIDATE HYDROCHLORIDE 20 MG/1
TABLET ORAL
Qty: 150 TABLET | Refills: 0 | Status: SHIPPED | OUTPATIENT
Start: 2023-05-01 | End: 2023-05-19

## 2023-05-18 ENCOUNTER — ANCILLARY PROCEDURE (OUTPATIENT)
Dept: GENERAL RADIOLOGY | Facility: CLINIC | Age: 63
End: 2023-05-18
Attending: FAMILY MEDICINE
Payer: MEDICARE

## 2023-05-18 ENCOUNTER — OFFICE VISIT (OUTPATIENT)
Dept: FAMILY MEDICINE | Facility: CLINIC | Age: 63
End: 2023-05-18
Payer: MEDICARE

## 2023-05-18 VITALS
OXYGEN SATURATION: 100 % | BODY MASS INDEX: 21.28 KG/M2 | WEIGHT: 152 LBS | HEIGHT: 71 IN | HEART RATE: 77 BPM | DIASTOLIC BLOOD PRESSURE: 84 MMHG | TEMPERATURE: 98.5 F | RESPIRATION RATE: 18 BRPM | SYSTOLIC BLOOD PRESSURE: 133 MMHG

## 2023-05-18 DIAGNOSIS — M19.041 PRIMARY OSTEOARTHRITIS OF BOTH HANDS: ICD-10-CM

## 2023-05-18 DIAGNOSIS — M19.042 PRIMARY OSTEOARTHRITIS OF BOTH HANDS: ICD-10-CM

## 2023-05-18 DIAGNOSIS — F90.0 ATTENTION DEFICIT HYPERACTIVITY DISORDER (ADHD), PREDOMINANTLY INATTENTIVE TYPE: ICD-10-CM

## 2023-05-18 DIAGNOSIS — Z12.5 SCREENING FOR PROSTATE CANCER: ICD-10-CM

## 2023-05-18 DIAGNOSIS — M72.0 DUPUYTREN'S CONTRACTURE OF BOTH HANDS: ICD-10-CM

## 2023-05-18 DIAGNOSIS — Z00.00 ENCOUNTER FOR MEDICARE ANNUAL WELLNESS EXAM: Primary | ICD-10-CM

## 2023-05-18 DIAGNOSIS — R63.4 WEIGHT LOSS: ICD-10-CM

## 2023-05-18 DIAGNOSIS — I10 BENIGN ESSENTIAL HYPERTENSION: ICD-10-CM

## 2023-05-18 LAB
ALBUMIN SERPL BCG-MCNC: 4.3 G/DL (ref 3.5–5.2)
ALBUMIN UR-MCNC: NEGATIVE MG/DL
ALP SERPL-CCNC: 80 U/L (ref 40–129)
ALT SERPL W P-5'-P-CCNC: 28 U/L (ref 10–50)
ANION GAP SERPL CALCULATED.3IONS-SCNC: 7 MMOL/L (ref 7–15)
APPEARANCE UR: CLEAR
AST SERPL W P-5'-P-CCNC: 22 U/L (ref 10–50)
BASOPHILS # BLD AUTO: 0.1 10E3/UL (ref 0–0.2)
BASOPHILS NFR BLD AUTO: 1 %
BILIRUB SERPL-MCNC: 0.2 MG/DL
BILIRUB UR QL STRIP: NEGATIVE
BUN SERPL-MCNC: 15.3 MG/DL (ref 8–23)
CALCIUM SERPL-MCNC: 9.4 MG/DL (ref 8.8–10.2)
CHLORIDE SERPL-SCNC: 104 MMOL/L (ref 98–107)
COLOR UR AUTO: YELLOW
CREAT SERPL-MCNC: 0.79 MG/DL (ref 0.67–1.17)
DEPRECATED HCO3 PLAS-SCNC: 28 MMOL/L (ref 22–29)
EOSINOPHIL # BLD AUTO: 0.4 10E3/UL (ref 0–0.7)
EOSINOPHIL NFR BLD AUTO: 6 %
ERYTHROCYTE [DISTWIDTH] IN BLOOD BY AUTOMATED COUNT: 14.3 % (ref 10–15)
GFR SERPL CREATININE-BSD FRML MDRD: >90 ML/MIN/1.73M2
GLUCOSE SERPL-MCNC: 88 MG/DL (ref 70–99)
GLUCOSE UR STRIP-MCNC: NEGATIVE MG/DL
HCT VFR BLD AUTO: 46.7 % (ref 40–53)
HGB BLD-MCNC: 15.3 G/DL (ref 13.3–17.7)
HGB UR QL STRIP: NEGATIVE
IMM GRANULOCYTES # BLD: 0 10E3/UL
IMM GRANULOCYTES NFR BLD: 0 %
KETONES UR STRIP-MCNC: NEGATIVE MG/DL
LEUKOCYTE ESTERASE UR QL STRIP: NEGATIVE
LIPASE SERPL-CCNC: 88 U/L (ref 13–60)
LYMPHOCYTES # BLD AUTO: 1.9 10E3/UL (ref 0.8–5.3)
LYMPHOCYTES NFR BLD AUTO: 27 %
MCH RBC QN AUTO: 29 PG (ref 26.5–33)
MCHC RBC AUTO-ENTMCNC: 32.8 G/DL (ref 31.5–36.5)
MCV RBC AUTO: 88 FL (ref 78–100)
MONOCYTES # BLD AUTO: 0.5 10E3/UL (ref 0–1.3)
MONOCYTES NFR BLD AUTO: 7 %
NEUTROPHILS # BLD AUTO: 4.3 10E3/UL (ref 1.6–8.3)
NEUTROPHILS NFR BLD AUTO: 60 %
NITRATE UR QL: NEGATIVE
PH UR STRIP: 7 [PH] (ref 5–7)
PLATELET # BLD AUTO: 243 10E3/UL (ref 150–450)
POTASSIUM SERPL-SCNC: 4.6 MMOL/L (ref 3.4–5.3)
PROT SERPL-MCNC: 6.7 G/DL (ref 6.4–8.3)
PSA SERPL DL<=0.01 NG/ML-MCNC: 3.59 NG/ML (ref 0–4.5)
RBC # BLD AUTO: 5.28 10E6/UL (ref 4.4–5.9)
SODIUM SERPL-SCNC: 139 MMOL/L (ref 136–145)
SP GR UR STRIP: 1.02 (ref 1–1.03)
TSH SERPL DL<=0.005 MIU/L-ACNC: 4.08 UIU/ML (ref 0.3–4.2)
UROBILINOGEN UR STRIP-ACNC: 0.2 E.U./DL
WBC # BLD AUTO: 7.1 10E3/UL (ref 4–11)

## 2023-05-18 PROCEDURE — 83521 IG LIGHT CHAINS FREE EACH: CPT | Performed by: FAMILY MEDICINE

## 2023-05-18 PROCEDURE — 99214 OFFICE O/P EST MOD 30 MIN: CPT | Mod: 25 | Performed by: FAMILY MEDICINE

## 2023-05-18 PROCEDURE — 86335 IMMUNFIX E-PHORSIS/URINE/CSF: CPT

## 2023-05-18 PROCEDURE — 80050 GENERAL HEALTH PANEL: CPT | Performed by: FAMILY MEDICINE

## 2023-05-18 PROCEDURE — 87389 HIV-1 AG W/HIV-1&-2 AB AG IA: CPT | Performed by: FAMILY MEDICINE

## 2023-05-18 PROCEDURE — G0103 PSA SCREENING: HCPCS | Performed by: FAMILY MEDICINE

## 2023-05-18 PROCEDURE — G0439 PPPS, SUBSEQ VISIT: HCPCS | Performed by: FAMILY MEDICINE

## 2023-05-18 PROCEDURE — 36415 COLL VENOUS BLD VENIPUNCTURE: CPT | Performed by: FAMILY MEDICINE

## 2023-05-18 PROCEDURE — 83690 ASSAY OF LIPASE: CPT | Performed by: FAMILY MEDICINE

## 2023-05-18 PROCEDURE — 81003 URINALYSIS AUTO W/O SCOPE: CPT | Performed by: FAMILY MEDICINE

## 2023-05-18 PROCEDURE — 71046 X-RAY EXAM CHEST 2 VIEWS: CPT | Mod: TC | Performed by: RADIOLOGY

## 2023-05-18 ASSESSMENT — ANXIETY QUESTIONNAIRES
7. FEELING AFRAID AS IF SOMETHING AWFUL MIGHT HAPPEN: NOT AT ALL
1. FEELING NERVOUS, ANXIOUS, OR ON EDGE: NOT AT ALL
5. BEING SO RESTLESS THAT IT IS HARD TO SIT STILL: NOT AT ALL
6. BECOMING EASILY ANNOYED OR IRRITABLE: NOT AT ALL
GAD7 TOTAL SCORE: 0
3. WORRYING TOO MUCH ABOUT DIFFERENT THINGS: NOT AT ALL
GAD7 TOTAL SCORE: 0
2. NOT BEING ABLE TO STOP OR CONTROL WORRYING: NOT AT ALL
IF YOU CHECKED OFF ANY PROBLEMS ON THIS QUESTIONNAIRE, HOW DIFFICULT HAVE THESE PROBLEMS MADE IT FOR YOU TO DO YOUR WORK, TAKE CARE OF THINGS AT HOME, OR GET ALONG WITH OTHER PEOPLE: NOT DIFFICULT AT ALL

## 2023-05-18 ASSESSMENT — ENCOUNTER SYMPTOMS
PALPITATIONS: 0
HEMATOCHEZIA: 0
ABDOMINAL PAIN: 0
NERVOUS/ANXIOUS: 0
DYSURIA: 0
CHILLS: 0
HEARTBURN: 0
FEVER: 0
CONSTIPATION: 0
PARESTHESIAS: 0
SORE THROAT: 0
JOINT SWELLING: 1
MYALGIAS: 0
HEMATURIA: 0
ARTHRALGIAS: 1
WEAKNESS: 0
DIZZINESS: 0
EYE PAIN: 0
FREQUENCY: 0
COUGH: 0
SHORTNESS OF BREATH: 0
HEADACHES: 0
NAUSEA: 0
DIARRHEA: 0

## 2023-05-18 ASSESSMENT — PATIENT HEALTH QUESTIONNAIRE - PHQ9
SUM OF ALL RESPONSES TO PHQ QUESTIONS 1-9: 0
5. POOR APPETITE OR OVEREATING: NOT AT ALL

## 2023-05-18 ASSESSMENT — ACTIVITIES OF DAILY LIVING (ADL): CURRENT_FUNCTION: NO ASSISTANCE NEEDED

## 2023-05-18 NOTE — PROGRESS NOTES
"    The patient was counseled and encouraged to consider modifying their diet and eating habits. He was provided with information on recommended healthy diet optio.me    Ns.      Leon Moss MD    Answers for HPI/ROS submitted by the patient on 5/18/2023  In general, how would you rate your overall physical health?: good  Frequency of exercise:: 2-3 days/week  Do you usually eat at least 4 servings of fruit and vegetables a day, include whole grains & fiber, and avoid regularly eating high fat or \"junk\" foods? : No  Taking medications regularly:: Yes  Medication side effects:: None  Activities of Daily Living: no assistance needed  Home safety: no safety concerns identified  Hearing Impairment:: no hearing concerns  In the past 6 months, have you been bothered by leaking of urine?: No  abdominal pain: No  Blood in stool: No  Blood in urine: No  chest pain: No  chills: No  congestion: No  constipation: No  cough: No  diarrhea: No  dizziness: No  ear pain: No  eye pain: No  nervous/anxious: No  fever: No  frequency: No  genital sores: No  headaches: No  hearing loss: Yes  heartburn: No  arthralgias: Yes  joint swelling: Yes  peripheral edema: No  mood changes: No  myalgias: No  nausea: No  dysuria: No  palpitations: No  Skin sensation changes: No  sore throat: No  urgency: No  rash: No  shortness of breath: No  visual disturbance: No  weakness: No  impotence: No  penile discharge: No  In general, how would you rate your overall mental or emotional health?: good  Additional concerns today:: Yes  Duration of exercise:: 15-30 minutes      "

## 2023-05-18 NOTE — PROGRESS NOTES
"SUBJECTIVE:   Parveen is a 62 year old who presents for Preventive Visit.      5/18/2023     2:19 PM   Additional Questions   Roomed by Aylin HUDSON   Patient has been advised of split billing requirements and indicates understanding: Yes  Are you in the first 12 months of your Medicare coverage?  No    Healthy Habits:     In general, how would you rate your overall health?  Good    Frequency of exercise:  2-3 days/week    Duration of exercise:  15-30 minutes    Do you usually eat at least 4 servings of fruit and vegetables a day, include whole grains    & fiber and avoid regularly eating high fat or \"junk\" foods?  No    Taking medications regularly:  Yes    Barriers to taking medications:  None    Medication side effects:  None    Ability to successfully perform activities of daily living:  No assistance needed    Home Safety:  No safety concerns identified    Hearing Impairment:  No hearing concerns    In the past 6 months, have you been bothered by leaking of urine?  No    In general, how would you rate your overall mental or emotional health?  Good      PHQ-2 Total Score: 0    Additional concerns today:  Yes        Have you ever done Advance Care Planning? (For example, a Health Directive, POLST, or a discussion with a medical provider or your loved ones about your wishes): No, advance care planning information given to patient to review.  Patient declined advance care planning discussion at this time.       Fall risk  Fallen 2 or more times in the past year?: No  Any fall with injury in the past year?: NoNo falls this past year  click delete button to remove this line now  Cognitive Screening   1) Repeat 3 items (Leader, Season, Table)    2) Clock draw: NORMAL  3) 3 item recall: Recalls 2 objects   Results: NORMAL clock, 1-2 items recalled: COGNITIVE IMPAIRMENT LESS LIKELY    Mini-CogTM Copyright TRISTAN Drake. Licensed by the author for use in [facility]; reprinted with permission (ignacio@.[facility]). All " rights reserved.      Do you have sleep apnea, excessive snoring or daytime drowsiness?: no     Medication Followup of Ritalin 20mg    Taking Medication as prescribed: yes    Side Effects:  None    Medication Helping Symptoms:  yes         Reviewed and updated as needed this visit by clinical staff   Tobacco  Allergies  Meds              Reviewed and updated as needed this visit by Provider                 Social History     Tobacco Use     Smoking status: Former     Packs/day: 0.50     Years: 4.00     Pack years: 2.00     Types: Cigarettes     Quit date: 2017     Years since quittin.5     Smokeless tobacco: Never   Vaping Use     Vaping status: Never Used   Substance Use Topics     Alcohol use: No             2023     2:09 PM   Alcohol Use   Prescreen: >3 drinks/day or >7 drinks/week? Not Applicable     Do you have a current opioid prescription? No  Do you use any other controlled substances or medications that are not prescribed by a provider? None              Current providers sharing in care for this patient include:   Patient Care Team:  Leon Moss MD as PCP - General (Family Practice)  Leon Moss MD as Assigned PCP  Kimberly Casarez MSW as Lead Care Coordinator (Primary Care - CC)    The following health maintenance items are reviewed in Epic and correct as of today:  Health Maintenance   Topic Date Due     LUNG CANCER SCREENING  Never done     COLORECTAL CANCER SCREENING  2021     MEDICARE ANNUAL WELLNESS VISIT  2022     A1C  10/05/2023     CMP  10/05/2023     LIPID  10/05/2023     URINE DRUG SCREEN  10/05/2023     ANNUAL REVIEW OF HM ORDERS  10/05/2023     ADVANCE CARE PLANNING  2026     DTAP/TDAP/TD IMMUNIZATION (3 - Td or Tdap) 10/05/2026     HEPATITIS C SCREENING  Completed     HIV SCREENING  Completed     PHQ-2 (once per calendar year)  Completed     ZOSTER IMMUNIZATION  Completed     COVID-19 Vaccine  Completed     Pneumococcal Vaccine: Pediatrics (0 to 5 Years)  "and At-Risk Patients (6 to 64 Years)  Aged Out     IPV IMMUNIZATION  Aged Out     MENINGITIS IMMUNIZATION  Aged Out     INFLUENZA VACCINE  Discontinued               Review of Systems   Constitutional: Negative for chills and fever.   HENT: Positive for hearing loss. Negative for congestion, ear pain and sore throat.    Eyes: Negative for pain and visual disturbance.   Respiratory: Negative for cough and shortness of breath.    Cardiovascular: Negative for chest pain, palpitations and peripheral edema.   Gastrointestinal: Negative for abdominal pain, constipation, diarrhea, heartburn, hematochezia and nausea.   Genitourinary: Negative for dysuria, frequency, genital sores, hematuria, impotence, penile discharge and urgency.   Musculoskeletal: Positive for arthralgias and joint swelling. Negative for myalgias.   Skin: Negative for rash.   Neurological: Negative for dizziness, weakness, headaches and paresthesias.   Psychiatric/Behavioral: Negative for mood changes. The patient is not nervous/anxious.          OBJECTIVE:   /84 (BP Location: Right arm, Patient Position: Sitting, Cuff Size: Adult Regular)   Pulse 77   Temp 98.5  F (36.9  C) (Oral)   Resp 18   Ht 1.803 m (5' 11\")   Wt 68.9 kg (152 lb)   SpO2 100%   BMI 21.20 kg/m   Estimated body mass index is 21.2 kg/m  as calculated from the following:    Height as of this encounter: 1.803 m (5' 11\").    Weight as of this encounter: 68.9 kg (152 lb).  Physical Exam  Constitutional:       Appearance: Normal appearance.   HENT:      Head: Atraumatic.      Right Ear: Tympanic membrane normal.      Left Ear: Tympanic membrane normal.      Nose: Nose normal.      Mouth/Throat:      Mouth: Mucous membranes are moist.   Eyes:      Pupils: Pupils are equal, round, and reactive to light.   Cardiovascular:      Rate and Rhythm: Normal rate and regular rhythm.      Heart sounds: Normal heart sounds.   Pulmonary:      Effort: Pulmonary effort is normal.      Breath " sounds: Normal breath sounds.   Abdominal:      General: Abdomen is flat. Bowel sounds are normal.   Musculoskeletal:      Cervical back: Neck supple.      Comments: Hands as described   Skin:     General: Skin is warm and dry.   Neurological:      General: No focal deficit present.      Mental Status: He is alert.   Psychiatric:         Mood and Affect: Mood normal.       LYMPH No axillary, periclavicular, inguinal, epitrochlear, nor popliteal nodes          ASSESSMENT / PLAN:     Problem List Items Addressed This Visit     Attention deficit hyperactivity disorder (ADHD), predominantly inattentive type     He is on high-dose amphetamines, effective.  May be causing weight loss.  Reassess after evaluation         Benign essential hypertension     Controlled.  Check twice yearly         Dupuytren's contracture of both hands     He has a relative who had interventions which resulted in worsening of contractures.  At the moment he desires no interventions         Encounter for Medicare annual wellness exam - Primary    Primary osteoarthritis of both hands     He has some restrictions of activity, but minimal pain.  He desires no additional interventions.  Previously sent for hand therapy         Screening for prostate cancer     Broaden database         Relevant Orders    PSA, screen (Completed)    Weight loss    Relevant Orders    Kappa and lambda light chain    UA Macroscopic with reflex to Microscopic and Culture (Completed)    Lipase (Completed)    TSH with free T4 reflex (Completed)    CBC with platelets and differential (Completed)    Protein immunofixation urine    XR Chest 2 Views (Completed)    Comprehensive metabolic panel (BMP + Alb, Alk Phos, ALT, AST, Total. Bili, TP) (Completed)    HIV Antigen Antibody Combo       Patient has been advised of split billing requirements and indicates understanding: Yes      COUNSELING:  Reviewed preventive health counseling, as reflected in patient instructions        He  reports that he quit smoking about 5 years ago. His smoking use included cigarettes. He has a 2.00 pack-year smoking history. He has never used smokeless tobacco.      Appropriate preventive services were discussed with this patient, including applicable screening as appropriate for cardiovascular disease, diabetes, osteopenia/osteoporosis, and glaucoma.  As appropriate for age/gender, discussed screening for colorectal cancer, prostate cancer, breast cancer, and cervical cancer. Checklist reviewing preventive services available has been given to the patient.    Reviewed patients plan of care and provided an AVS. The Basic Care Plan (routine screening as documented in Health Maintenance) for Parveen meets the Care Plan requirement. This Care Plan has been established and reviewed with the Patient.      Leon Moss MD  [facility]    Identified Health Risks:    I have reviewed Opioid Use Disorder and Substance Use Disorder risk factors and made any needed referrals.

## 2023-05-18 NOTE — PATIENT INSTRUCTIONS
Patient Education   Personalized Prevention Plan  You are due for the preventive services outlined below.  Your care team is available to assist you in scheduling these services.  If you have already completed any of these items, please share that information with your care team to update in your medical record.  Health Maintenance Due   Topic Date Due     Colorectal Cancer Screening  09/06/2021     Annual Wellness Visit  07/08/2022       Understanding USDA MyPlate  The USDA has guidelines to help you make healthy food choices. These are called MyPlate. MyPlate shows the food groups that make up healthy meals using the image of a place setting. Before you eat, think about the healthiest choices for what to put on your plate or in your cup or bowl. To learn more about building a healthy plate, visit www.choosemyplate.gov.     The food groups    Fruits. Any fruit or 100% fruit juice counts as part of the Fruit Group. Fruits may be fresh, canned, frozen, or dried, and may be whole, cut-up, or pureed. Make 1/2 of your plate fruits and vegetables.    Vegetables. Any vegetable or 100% vegetable juice counts as a member of the Vegetable Group. Vegetables may be fresh, frozen, canned, or dried. They can be served raw or cooked and may be whole, cut-up, or mashed. Make 1/2 of your plate fruits and vegetables.    Grains. All foods made from grains are part of the Grains Group. These include wheat, rice, oats, cornmeal, and barley. Grains are often used to make foods such as bread, pasta, oatmeal, cereal, tortillas, and grits. Grains should be no more than 1/4 of your plate. At least half of your grains should be whole grains.    Protein. This group includes meat, poultry, seafood, beans and peas, eggs, processed soy products (such as tofu), nuts (including nut butters), and seeds. Make protein choices no more than 1/4 of your plate. Meat and poultry choices should be lean or low fat.    Dairy. The Dairy Group includes all  fluid milk products and foods made from milk that contain calcium, such as yogurt and cheese. (Foods that have little calcium, such as cream, butter, and cream cheese, are not part of this group.) Most dairy choices should be low-fat or fat-free.    Oils. Oils aren't a food group, but they do contain essential nutrients. However it's important to watch your intake of oils. These are fats that are liquid at room temperature. They include canola, corn, olive, soybean, vegetable, and sunflower oil. Foods that are mainly oil include mayonnaise, certain salad dressings, and soft margarines. You likely already get your daily oil allowance from the foods you eat.  Things to limit  Eating healthy also means limiting these things in your diet:    Salt (sodium). Many processed foods have a lot of sodium. To keep sodium intake down, eat fresh vegetables, meats, poultry, and seafood when possible. Purchase low-sodium, reduced-sodium, or no-salt-added food products at the store. And don't add salt to your meals at home. Instead, season them with herbs and spices such as dill, oregano, cumin, and paprika. Or try adding flavor with lemon or lime zest and juice.    Saturated fat. Saturated fats are most often found in animal products such as beef, pork, and chicken. They are often solid at room temperature, such as butter. To reduce your saturated fat intake, choose leaner cuts of meat and poultry. And try healthier cooking methods such as grilling, broiling, roasting, or baking. For a simple lower-fat swap, use plain nonfat yogurt instead of mayonnaise when making potato salad or macaroni salad.    Added sugars. These are sugars added to foods. They are in foods such as ice cream, candy, soda, fruit drinks, sports drinks, energy drinks, cookies, pastries, jams, and syrups. Cut down on added sugars by sharing sweet treats with a family member or friend. You can also choose fruit for dessert, and drink water or other unsweetened  eliezer Wahl last reviewed this educational content on 6/1/2020 2000-2022 [employer]. All rights reserved. This information is not intended as a substitute for professional medical care. Always follow your healthcare professional's instructions.

## 2023-05-19 DIAGNOSIS — R74.8 ELEVATED LIPASE: Primary | ICD-10-CM

## 2023-05-19 PROBLEM — R63.4 WEIGHT LOSS: Status: ACTIVE | Noted: 2023-05-19

## 2023-05-19 PROBLEM — Z00.00 ENCOUNTER FOR MEDICARE ANNUAL WELLNESS EXAM: Status: ACTIVE | Noted: 2023-05-19

## 2023-05-19 LAB — PROT ELPH PNL UR ELPH: NORMAL

## 2023-05-19 NOTE — ASSESSMENT & PLAN NOTE
He is on high-dose amphetamines, effective.  May be causing weight loss.  Reassess after evaluation

## 2023-05-19 NOTE — ASSESSMENT & PLAN NOTE
He has a relative who had interventions which resulted in worsening of contractures.  At the moment he desires no interventions

## 2023-05-19 NOTE — ASSESSMENT & PLAN NOTE
He has some restrictions of activity, but minimal pain.  He desires no additional interventions.  Previously sent for hand therapy

## 2023-05-20 LAB — HIV 1+2 AB+HIV1 P24 AG SERPL QL IA: NONREACTIVE

## 2023-05-22 LAB
KAPPA LC FREE SER-MCNC: 1.98 MG/DL (ref 0.33–1.94)
KAPPA LC FREE/LAMBDA FREE SER NEPH: 1.52 {RATIO} (ref 0.26–1.65)
LAMBDA LC FREE SERPL-MCNC: 1.3 MG/DL (ref 0.57–2.63)

## 2023-06-05 DIAGNOSIS — F90.0 ATTENTION DEFICIT HYPERACTIVITY DISORDER (ADHD), PREDOMINANTLY INATTENTIVE TYPE: ICD-10-CM

## 2023-06-05 RX ORDER — METHYLPHENIDATE HYDROCHLORIDE 20 MG/1
TABLET ORAL
Qty: 150 TABLET | Refills: 0 | Status: SHIPPED | OUTPATIENT
Start: 2023-06-05 | End: 2023-07-03

## 2023-06-05 RX ORDER — METHYLPHENIDATE HYDROCHLORIDE 20 MG/1
TABLET ORAL
Qty: 150 TABLET | Refills: 0 | OUTPATIENT
Start: 2023-06-05

## 2023-06-05 RX ORDER — METHYLPHENIDATE HYDROCHLORIDE 20 MG/1
TABLET ORAL
Qty: 150 TABLET | Refills: 0 | Status: SHIPPED | OUTPATIENT
Start: 2023-06-05 | End: 2023-11-01

## 2023-06-05 RX ORDER — METHYLPHENIDATE HYDROCHLORIDE 20 MG/1
TABLET ORAL
Qty: 150 TABLET | Refills: 0 | Status: SHIPPED | OUTPATIENT
Start: 2023-06-05 | End: 2023-09-28

## 2023-06-20 ENCOUNTER — HOSPITAL ENCOUNTER (OUTPATIENT)
Dept: CT IMAGING | Facility: CLINIC | Age: 63
Discharge: HOME | End: 2023-06-20
Attending: FAMILY MEDICINE | Admitting: FAMILY MEDICINE
Payer: MEDICARE

## 2023-06-20 ENCOUNTER — TELEPHONE (OUTPATIENT)
Dept: FAMILY MEDICINE | Facility: CLINIC | Age: 63
End: 2023-06-20
Payer: MEDICARE

## 2023-06-20 DIAGNOSIS — R74.8 ELEVATED LIPASE: ICD-10-CM

## 2023-06-20 DIAGNOSIS — R91.1 PULMONARY NODULE: Primary | ICD-10-CM

## 2023-06-20 PROBLEM — Z87.898 HISTORY OF SOLITARY PULMONARY NODULE: Status: ACTIVE | Noted: 2023-06-20

## 2023-06-20 PROCEDURE — 258N000003 HC RX IP 258 OP 636: Performed by: FAMILY MEDICINE

## 2023-06-20 PROCEDURE — G1010 CDSM STANSON: HCPCS

## 2023-06-20 PROCEDURE — 250N000011 HC RX IP 250 OP 636: Performed by: FAMILY MEDICINE

## 2023-06-20 RX ORDER — IOPAMIDOL 755 MG/ML
500 INJECTION, SOLUTION INTRAVASCULAR ONCE
Status: COMPLETED | OUTPATIENT
Start: 2023-06-20 | End: 2023-06-20

## 2023-06-20 RX ADMIN — IOPAMIDOL 76 ML: 755 INJECTION, SOLUTION INTRAVENOUS at 10:07

## 2023-06-20 RX ADMIN — SODIUM CHLORIDE 55 ML: 9 INJECTION, SOLUTION INTRAVENOUS at 10:07

## 2023-06-20 NOTE — TELEPHONE ENCOUNTER
Called patient and advised of below.  He states he has gained the weight back.  Patient will schedule CT chest.   Hanh Thompson, RN    Leon Moss MD routed conversation to Cr Triage 52 minutes ago (4:15 PM)     Leon Moss MD 52 minutes ago (4:15 PM)     BW        The scan shows your pancreas is okay.       We did this test because of your weight loss. You had an elevated blood test, which comes from the pancreas.     However,it can also come from saliva.  Since your pancreas is okay, that will be our conclusion.  Nothing more needs to be done about that     There is an extremely small nodule in your lung, so we have to look further at your lungs.     However, recommendations will likely be to follow that up with another scan of the chest in 1 year, Unless something else comes up.     All good news     Leon Moss MD

## 2023-06-20 NOTE — CONFIDENTIAL NOTE
The scan shows your pancreas is okay.      We did this test because of your weight loss. You had an elevated blood test, which comes from the pancreas.    However,it can also come from saliva.  Since your pancreas is okay, that will be our conclusion.  Nothing more needs to be done about that    There is an extremely small nodule in your lung, so we have to look further at your lungs.    However, recommendations will likely be to follow that up with another scan of the chest in 1 year, Unless something else comes up.    All good news    Leon Moss MD

## 2023-06-22 ENCOUNTER — TELEPHONE (OUTPATIENT)
Dept: FAMILY MEDICINE | Facility: CLINIC | Age: 63
End: 2023-06-22
Payer: MEDICARE

## 2023-07-03 ENCOUNTER — TELEPHONE (OUTPATIENT)
Dept: FAMILY MEDICINE | Facility: CLINIC | Age: 63
End: 2023-07-03
Payer: MEDICARE

## 2023-07-03 DIAGNOSIS — F90.0 ATTENTION DEFICIT HYPERACTIVITY DISORDER (ADHD), PREDOMINANTLY INATTENTIVE TYPE: ICD-10-CM

## 2023-07-03 DIAGNOSIS — F90.0 ATTENTION DEFICIT HYPERACTIVITY DISORDER (ADHD), PREDOMINANTLY INATTENTIVE TYPE: Primary | ICD-10-CM

## 2023-07-03 RX ORDER — METHYLPHENIDATE HYDROCHLORIDE 20 MG/1
TABLET ORAL
Qty: 150 TABLET | Refills: 0 | Status: SHIPPED | OUTPATIENT
Start: 2023-07-03 | End: 2023-11-01

## 2023-07-03 RX ORDER — METHYLPHENIDATE HYDROCHLORIDE 10 MG/1
TABLET ORAL
Qty: 300 TABLET | Refills: 0 | Status: CANCELLED | OUTPATIENT
Start: 2023-07-03

## 2023-07-03 RX ORDER — METHYLPHENIDATE HYDROCHLORIDE 10 MG/1
TABLET ORAL
Qty: 300 TABLET | Refills: 0 | Status: SHIPPED | OUTPATIENT
Start: 2023-07-03 | End: 2023-07-03

## 2023-07-03 NOTE — TELEPHONE ENCOUNTER
Dr. Moss   Please disregard this message another to follow     Ginny Fischer, Registered Nurse  [facility]

## 2023-07-03 NOTE — TELEPHONE ENCOUNTER
Received a phone call from our pharmacy stating the patient needs his Methylphenidate 20 mg to be sent to [facility] - [place], [place] - [address], [address]  due to back order at the  Pharmacy. There is stock at the [place] location.     I stated that this will be routed to his team and will be handled as soon as we can.     Hortensia Rojas MA

## 2023-07-03 NOTE — TELEPHONE ENCOUNTER
Methylphenidate 20 mg backordered.  Pharmacy advised him to call and ask for 10 mg dose as they have that.  T'd up.  Please verify directions and quantity.  Please advise.  Hanh Thompson RN

## 2023-07-03 NOTE — TELEPHONE ENCOUNTER
Dr. Moss   Please see message below from pharmacy   Would you like to resend rx to [facility]?     Two different messages regarding this, please disregard the other one     Thank you   Ginny Fischer, Registered Nurse  [facility]

## 2023-07-12 ENCOUNTER — HOSPITAL ENCOUNTER (OUTPATIENT)
Dept: CT IMAGING | Facility: CLINIC | Age: 63
Discharge: HOME | End: 2023-07-12
Attending: FAMILY MEDICINE | Admitting: FAMILY MEDICINE
Payer: MEDICARE

## 2023-07-12 DIAGNOSIS — R91.1 PULMONARY NODULE: ICD-10-CM

## 2023-07-12 PROCEDURE — 250N000011 HC RX IP 250 OP 636: Performed by: FAMILY MEDICINE

## 2023-07-12 PROCEDURE — G1010 CDSM STANSON: HCPCS

## 2023-07-12 PROCEDURE — 250N000009 HC RX 250: Performed by: FAMILY MEDICINE

## 2023-07-12 RX ORDER — IOPAMIDOL 755 MG/ML
500 INJECTION, SOLUTION INTRAVASCULAR ONCE
Status: COMPLETED | OUTPATIENT
Start: 2023-07-12 | End: 2023-07-12

## 2023-07-12 RX ADMIN — SODIUM CHLORIDE 55 ML: 9 INJECTION, SOLUTION INTRAVENOUS at 08:30

## 2023-07-12 RX ADMIN — IOPAMIDOL 76 ML: 755 INJECTION, SOLUTION INTRAVENOUS at 08:30

## 2023-07-12 NOTE — LETTER
July 12, 2023      Parveen Flor  [place] [facility] [id number]        Dear ,    We are writing to inform you of your test results.  CT is okay.  We recommend repeating it in 1 year       Resulted Orders   CT Chest w Contrast    Narrative    CT CHEST WITH CONTRAST July 12, 2023 8:34 AM    HISTORY: Pulmonary nodule.    TECHNIQUE: Scans obtained from the apices through the diaphragm with  IV contrast. 76mL Isovue-370 IV injected. Radiation dose for this scan  was reduced using automated exposure control, adjustment of the mA  and/or kV according to patient size, or iterative reconstruction  technique. 2D and 3D MIP reconstructions were performed by the CT  technologist    COMPARISON: Chest x-ray on 5/18/2023.    FINDINGS:  Chest/mediastinum: No cardiomegaly or significant pericardial  effusion. Moderate atherosclerotic vascular calcification of the  coronary arteries. No significant mediastinal or hilar  lymphadenopathy.     Lungs and pleura: No pleural effusion or pneumothorax. Biapical  scarring. Basilar pulmonary opacities, likely atelectasis. Scattered  pulmonary nodules including 5 mm left lower lobe subpleural nodule  (series 6 image 271), 3 mm right middle lobe nodule (series 6 image  216), 5 mm left upper lobe nodule (series 6 image 216).    Upper abdomen: Limited evaluation of the upper abdomen due to lack of  coverage. There is 3.5 cm cyst at the upper pole of the left kidney  (series 4 image 154) and 1.9 cm cyst in hepatic segment 7 (series 4  image 159).    Bones and soft tissue: No suspicious osseous lesion.      Impression    IMPRESSION:   1. Scattered pulmonary nodules including 5 mm left upper lobe nodule,  as per Fleischner's society criteria, for low risk patient no routine  follow-up is recommended, and for high-risk patient, optional chest CT  in 12 months can be considered.  2. Moderate atherosclerotic vascular calcification within coronary  arteries.    AHMAD  MD GIORGI         SYSTEM ID:  [id number]       If you have any questions or concerns, please call the clinic at the number listed above.       Sincerely,      Leon Moss MD

## 2023-08-08 ENCOUNTER — TELEPHONE (OUTPATIENT)
Dept: FAMILY MEDICINE | Facility: CLINIC | Age: 63
End: 2023-08-08
Payer: MEDICARE

## 2023-08-08 NOTE — TELEPHONE ENCOUNTER
Pt calling for results of CT completed 7/12/23. Letter was sent with results (see below)  Sent 7/12/2023 by Eileen Burgess CMA     Pt informs he never received letter. Relayed results below.  Dear ,    We are writing to inform you of your test results.  CT is okay.  We recommend repeating it in 1 year     Advised pt to sign up for "[employer]"t for easier access to results and communication and appointment scheduling. Provided Hello Chair help number for pt. Pt also asks when his next follow up is due. Relayed follow up instructions (see below)    Order Questions    Question Answer   Follow-up with whom? Me   Follow-Up for what? Chronic Disease f/u   Chronic Disease f/u: General (Other)   Additional Details: ADD   How? In Person   Is this an as-needed follow-up? No       Patient was given an opportunity to ask questions, verbalized understanding of plan, and is agreeable.    Pt also would like to update PCP that he has gained some weight back since he was seen in May. Pt reports his weight is now running 158-162 lbs (up from 150 lbs in May)    Routing to PCP as an JEREMYI  Vandana ACUÑA RN

## 2023-09-28 DIAGNOSIS — F90.0 ATTENTION DEFICIT HYPERACTIVITY DISORDER (ADHD), PREDOMINANTLY INATTENTIVE TYPE: ICD-10-CM

## 2023-09-28 RX ORDER — METHYLPHENIDATE HYDROCHLORIDE 20 MG/1
TABLET ORAL
Qty: 150 TABLET | Refills: 0 | Status: SHIPPED | OUTPATIENT
Start: 2023-09-28 | End: 2023-11-01

## 2023-11-01 ENCOUNTER — OFFICE VISIT (OUTPATIENT)
Dept: FAMILY MEDICINE | Facility: CLINIC | Age: 63
End: 2023-11-01
Payer: MEDICARE

## 2023-11-01 VITALS
HEIGHT: 71 IN | BODY MASS INDEX: 22.06 KG/M2 | WEIGHT: 157.6 LBS | HEART RATE: 68 BPM | DIASTOLIC BLOOD PRESSURE: 82 MMHG | RESPIRATION RATE: 17 BRPM | SYSTOLIC BLOOD PRESSURE: 138 MMHG | OXYGEN SATURATION: 97 % | TEMPERATURE: 97.6 F

## 2023-11-01 DIAGNOSIS — I10 BENIGN ESSENTIAL HYPERTENSION: ICD-10-CM

## 2023-11-01 DIAGNOSIS — Z23 ENCOUNTER FOR IMMUNIZATION: ICD-10-CM

## 2023-11-01 DIAGNOSIS — E78.5 HYPERLIPIDEMIA LDL GOAL <160: ICD-10-CM

## 2023-11-01 DIAGNOSIS — Z13.6 ENCOUNTER FOR ABDOMINAL AORTIC ANEURYSM (AAA) SCREENING: ICD-10-CM

## 2023-11-01 DIAGNOSIS — Z12.11 SCREEN FOR COLON CANCER: Primary | ICD-10-CM

## 2023-11-01 DIAGNOSIS — F90.0 ATTENTION DEFICIT HYPERACTIVITY DISORDER (ADHD), PREDOMINANTLY INATTENTIVE TYPE: ICD-10-CM

## 2023-11-01 DIAGNOSIS — R73.09 ELEVATED GLUCOSE: ICD-10-CM

## 2023-11-01 DIAGNOSIS — F81.9 LEARNING DISABILITY: ICD-10-CM

## 2023-11-01 DIAGNOSIS — Z79.899 CONTROLLED SUBSTANCE AGREEMENT SIGNED: ICD-10-CM

## 2023-11-01 DIAGNOSIS — Z28.39 IMMUNIZATION DEFICIENCY: ICD-10-CM

## 2023-11-01 LAB
CHOLEST SERPL-MCNC: 241 MG/DL
CREAT UR-MCNC: 35 MG/DL
HBA1C MFR BLD: 5.6 % (ref 0–5.6)
HDLC SERPL-MCNC: 49 MG/DL
LDLC SERPL CALC-MCNC: 179 MG/DL
NONHDLC SERPL-MCNC: 192 MG/DL
TRIGL SERPL-MCNC: 64 MG/DL

## 2023-11-01 PROCEDURE — 83036 HEMOGLOBIN GLYCOSYLATED A1C: CPT | Performed by: FAMILY MEDICINE

## 2023-11-01 PROCEDURE — G0480 DRUG TEST DEF 1-7 CLASSES: HCPCS | Performed by: FAMILY MEDICINE

## 2023-11-01 PROCEDURE — 99214 OFFICE O/P EST MOD 30 MIN: CPT | Performed by: FAMILY MEDICINE

## 2023-11-01 PROCEDURE — 80061 LIPID PANEL: CPT | Performed by: FAMILY MEDICINE

## 2023-11-01 PROCEDURE — 36415 COLL VENOUS BLD VENIPUNCTURE: CPT | Performed by: FAMILY MEDICINE

## 2023-11-01 RX ORDER — LISINOPRIL 10 MG/1
10 TABLET ORAL DAILY
Qty: 90 TABLET | Refills: 3 | Status: SHIPPED | OUTPATIENT
Start: 2023-11-01 | End: 2024-05-28

## 2023-11-01 RX ORDER — METHYLPHENIDATE HYDROCHLORIDE 20 MG/1
TABLET ORAL
Qty: 150 TABLET | Refills: 0 | Status: SHIPPED | OUTPATIENT
Start: 2023-11-01 | End: 2024-05-28

## 2023-11-01 RX ORDER — RESPIRATORY SYNCYTIAL VIRUS VACCINE 120MCG/0.5
0.5 KIT INTRAMUSCULAR ONCE
Qty: 1 EACH | Refills: 0 | Status: CANCELLED | OUTPATIENT
Start: 2023-11-01 | End: 2023-11-01

## 2023-11-01 RX ORDER — ATORVASTATIN CALCIUM 40 MG/1
40 TABLET, FILM COATED ORAL DAILY
Qty: 90 TABLET | Refills: 3 | Status: SHIPPED | OUTPATIENT
Start: 2023-11-01

## 2023-11-01 RX ORDER — METHYLPHENIDATE HYDROCHLORIDE 20 MG/1
TABLET ORAL
Qty: 150 TABLET | Refills: 0 | Status: SHIPPED | OUTPATIENT
Start: 2023-12-02 | End: 2024-05-28

## 2023-11-01 RX ORDER — METHYLPHENIDATE HYDROCHLORIDE 20 MG/1
TABLET ORAL
Qty: 150 TABLET | Refills: 0 | Status: SHIPPED | OUTPATIENT
Start: 2024-01-02 | End: 2024-05-28

## 2023-11-01 NOTE — PROGRESS NOTES
Assessment & Plan   Problem List Items Addressed This Visit       Attention deficit hyperactivity disorder (ADHD), predominantly inattentive type     Feels better than he ever has.  Weight is stable.  His discussion of symptoms it sounds like he may have dyslexia as well.  Formal evaluation at this time does not appear indicated.  Continue         Relevant Medications    methylphenidate (RITALIN) 20 MG tablet    methylphenidate (RITALIN) 20 MG tablet (Start on 12/2/2023)    methylphenidate (RITALIN) 20 MG tablet (Start on 1/2/2024)    Other Relevant Orders    [id number] - Urine Drug Confirmation Panel (Comprehensive)    Benign essential hypertension     Controlled.  Continue.  Check twice yearly         Relevant Medications    lisinopril (ZESTRIL) 10 MG tablet    Controlled substance agreement signed     anew         Elevated glucose     Periodic A1c.  Not prediabetes         Relevant Orders    HEMOGLOBIN A1C (Completed)    Encounter for abdominal aortic aneurysm (AAA) screening     Discussed order         Relevant Orders    [place] Aorta Medicare AAA Screening    Encounter for immunization     Offered         Hyperlipidemia LDL goal <160     Statin therapy.  Continue         Relevant Medications    atorvastatin (LIPITOR) 40 MG tablet    Other Relevant Orders    Lipid panel reflex to direct LDL Non-fasting    Immunization deficiency     He declines additional COVID vaccinations         Relevant Medications    methylphenidate (RITALIN) 20 MG tablet    methylphenidate (RITALIN) 20 MG tablet (Start on 12/2/2023)    methylphenidate (RITALIN) 20 MG tablet (Start on 1/2/2024)    Learning disability     He is disabled         Screen for colon cancer - Primary     He is due.  Refer         Relevant Orders    Colonoscopy Screening  Referral                     Leon Moss MD  [facility]    Joby Saini is a 63 year old, presenting for the following health issues:  Recheck  "Medication        11/1/2023     9:07 AM   Additional Questions   Roomed by Sonja Reynaga       History of Present Illness       Reason for visit:  Check up    He eats 0-1 servings of fruits and vegetables daily.He consumes 2 sweetened beverage(s) daily.He exercises with enough effort to increase his heart rate 20 to 29 minutes per day.  He exercises with enough effort to increase his heart rate 5 days per week.   He is taking medications regularly.         Medication Followup of methylphenidate (RITALIN) 20 MG tablet   Taking Medication as prescribed: yes  Side Effects:  None  Medication Helping Symptoms:  yes        Review of Systems   He has no complaints.  OA hands are in general painless, although function is decreased      Objective    /82 (BP Location: Right arm, Patient Position: Sitting, Cuff Size: Adult Regular)   Pulse 68   Temp 97.6  F (36.4  C) (Oral)   Resp 17   Ht 1.803 m (5' 11\")   Wt 71.5 kg (157 lb 9.6 oz)   SpO2 97%   BMI 21.98 kg/m    Body mass index is 21.98 kg/m .  Physical Exam     He is cheerful  Leon Moss MD                      "

## 2023-11-01 NOTE — LETTER
[facility]  11/01/23  Patient: Parveen Flor  YOB: 1960  Medical Record Number: [id number]                                                                                  Non-Opioid Controlled Substance Agreement    This is an agreement between you and your provider regarding safe and appropriate use of controlled substances prescribed by your care team. Controlled substances are?medicines that can cause physical and mental dependence (abuse).     There are strict laws about having and using these medicines. We here at [facility] are  committed to working with you in your efforts to get better. To support you in this work, we'll help you schedule regular office appointments for medicine refills. If we must cancel or change your appointment for any reason, we'll make sure you have enough medicine to last until your next appointment.     As a Provider, I will:   Listen carefully to your concerns while treating you with respect.   Recommend a treatment plan that I believe is in your best interest and may involve therapies other than medicine.    Talk with you often about the possible benefits and the risk of harm of any medicine that we prescribe for you.  Assess the safety of this medicine and check how well it works.    Provide a plan on how to taper (discontinue or go off) using this medicine if the decision is made to stop its use.      ::  As a Patient, I understand controlled substances:     Are prescribed by my care provider to help me function or work and manage my condition(s).?  Are strong medicines and can cause serious side effects.     Need to be taken exactly as prescribed.?Combining controlled substances with certain medicines or chemicals (such as illegal drugs, alcohol, sedatives, sleeping pills, and benzodiazepines) can be dangerous or even fatal.? If I stop taking my medicines suddenly, I may have severe withdrawal symptoms.     The risks,  benefits, and side effects of these medicine(s) were explained to me. I agree that:    I will take part in other treatments as advised by my care team. This may be psychiatry or counseling, physical therapy, behavioral therapy, group treatment or a referral to specialist.    I will keep all my appointments and understand this is part of the monitoring of controlled substances.?My care team may require an office visit for EVERY controlled substance refill. If I miss appointments or don t follow instructions, my care team may stop my medicine    I will take my medicines as prescribed. I will not change the dose or schedule unless my care team tells me to. There will be no refills if I run out early.      I may be asked to come to the clinic and complete a urine drug test or complete a pill count. If I don t give a urine sample or participate in a pill count, the care team may stop my medicine.    I will only receive controlled substance prescriptions from this clinic. If I am treated by another provider, I will tell them that I am taking controlled substances and that I have a treatment agreement with this provider. I will inform my [facility] care team within one business day if I am given a prescription for any controlled substance by another healthcare provider. My [facility] care team can contact other providers and pharmacists about my use of any medicines.    It is up to me to make sure that I don't run out of my medicines on weekends or holidays.?If my care team is willing to refill my prescription without a visit, I must request refills only during office hours. Refills may take up to 3 business days to process. I will use one pharmacy to fill all my controlled substance prescriptions. I will notify the clinic about any changes to my insurance or medicine availability.    I am responsible for my prescriptions. If the medicine/prescription is lost, stolen or destroyed, it will not be replaced.?I  also agree not to share controlled substance medicines with anyone.     I am aware I should not use any illegal or recreational drugs. I agree not to drink alcohol unless my care team says I can.     If I enroll in the [place] Medical Cannabis program, I will tell my care team before my next refill.    I will tell my care team right away if I become pregnant, have a new medical problem treated outside of my regular clinic, or have a change in my medicines.     I understand that this medicine can affect my thinking, judgment and reaction time.? Alcohol and drugs affect the brain and body, which can affect the safety of my driving. Being under the influence of alcohol or drugs can affect my decision-making, behaviors, personal safety and the safety of others. Driving while impaired (DWI) can occur if a person is driving, operating or in physical control of a car, motorcycle, boat, snowmobile, ATV, motorbike, off-road vehicle or any other motor vehicle ([place] Statute 169[place].20). I understand the risk if I choose to drive or operate any vehicle or machinery.    I understand that if I do not follow any of the conditions above, my prescriptions or treatment may be stopped or changed.   I agree that my provider, clinic care team and pharmacy may work with any city, state or federal law enforcement agency that investigates the misuse, sale or other diversion of my controlled medicine. I will allow my provider to discuss my care with, or share a copy of, this agreement with any other treating provider, pharmacy or emergency room where I receive care.     I have read this agreement and have asked questions about anything I did not understand.    ________________________________________________________  Patient Signature - Parveen Flor     ___________________                   Date     ________________________________________________________  Provider Signature - Leon Moss MD       ___________________                    Date     ________________________________________________________  Witness Signature (required if provider not present while patient signing)          ___________________                   Date

## 2023-11-01 NOTE — ASSESSMENT & PLAN NOTE
Feels better than he ever has.  Weight is stable.  His discussion of symptoms it sounds like he may have dyslexia as well.  Formal evaluation at this time does not appear indicated.  Continue

## 2023-11-07 LAB
ME-PHENIDATE UR CFM-MCNC: 1520 NG/ML
ME-PHENIDATE/CREAT UR: 4343 NG/MG {CREAT}

## 2023-12-06 ENCOUNTER — HOSPITAL ENCOUNTER (OUTPATIENT)
Dept: ULTRASOUND IMAGING | Facility: CLINIC | Age: 63
Discharge: HOME | End: 2023-12-06
Attending: FAMILY MEDICINE | Admitting: FAMILY MEDICINE
Payer: MEDICARE

## 2023-12-06 DIAGNOSIS — Z13.6 ENCOUNTER FOR ABDOMINAL AORTIC ANEURYSM (AAA) SCREENING: ICD-10-CM

## 2023-12-06 PROCEDURE — 76706 US ABDL AORTA SCREEN AAA: CPT

## 2024-01-08 DIAGNOSIS — F90.0 ATTENTION DEFICIT HYPERACTIVITY DISORDER (ADHD), PREDOMINANTLY INATTENTIVE TYPE: Primary | ICD-10-CM

## 2024-01-08 RX ORDER — METHYLPHENIDATE HYDROCHLORIDE 10 MG/1
20 TABLET ORAL 2 TIMES DAILY
Qty: 120 TABLET | Refills: 0 | Status: CANCELLED | OUTPATIENT
Start: 2024-01-08

## 2024-01-08 RX ORDER — METHYLPHENIDATE HYDROCHLORIDE 20 MG/1
TABLET ORAL
Qty: 150 TABLET | Refills: 0 | Status: SHIPPED | OUTPATIENT
Start: 2024-03-10 | End: 2024-05-28

## 2024-01-08 RX ORDER — METHYLPHENIDATE HYDROCHLORIDE 20 MG/1
TABLET ORAL
Qty: 150 TABLET | Refills: 0 | Status: SHIPPED | OUTPATIENT
Start: 2024-02-08 | End: 2024-05-28

## 2024-01-08 RX ORDER — METHYLPHENIDATE HYDROCHLORIDE 20 MG/1
TABLET ORAL
Qty: 150 TABLET | Refills: 0 | Status: SHIPPED | OUTPATIENT
Start: 2024-01-08 | End: 2024-05-28

## 2024-01-08 NOTE — TELEPHONE ENCOUNTER
Pharmacy is out of Ritalin 20mg. Patient wondering if he can take Ritalin 10mg tablet  2 tablets daily?    Yana Sarmiento RN

## 2024-01-09 DIAGNOSIS — F90.0 ATTENTION DEFICIT HYPERACTIVITY DISORDER (ADHD), PREDOMINANTLY INATTENTIVE TYPE: Primary | ICD-10-CM

## 2024-01-09 RX ORDER — METHYLPHENIDATE HYDROCHLORIDE 10 MG/1
TABLET ORAL
Qty: 300 TABLET | Refills: 0 | Status: SHIPPED | OUTPATIENT
Start: 2024-01-09 | End: 2024-05-28

## 2024-01-30 ENCOUNTER — TELEPHONE (OUTPATIENT)
Dept: FAMILY MEDICINE | Facility: CLINIC | Age: 64
End: 2024-01-30
Payer: MEDICARE

## 2024-01-30 NOTE — TELEPHONE ENCOUNTER
Patient Quality Outreach    Patient is due for the following:   Colon Cancer Screening    Next Steps:   Patient has upcoming appointment, these items will be addressed at that time.    Type of outreach:    Chart review performed, no outreach needed.      Questions for provider review:    None           Aylin Sykes CMA

## 2024-04-18 ENCOUNTER — PATIENT OUTREACH (OUTPATIENT)
Dept: CARE COORDINATION | Facility: CLINIC | Age: 64
End: 2024-04-18
Payer: MEDICARE

## 2024-05-02 ENCOUNTER — PATIENT OUTREACH (OUTPATIENT)
Dept: CARE COORDINATION | Facility: CLINIC | Age: 64
End: 2024-05-02
Payer: MEDICARE

## 2024-05-28 ENCOUNTER — OFFICE VISIT (OUTPATIENT)
Dept: FAMILY MEDICINE | Facility: CLINIC | Age: 64
End: 2024-05-28
Payer: MEDICARE

## 2024-05-28 VITALS
DIASTOLIC BLOOD PRESSURE: 82 MMHG | HEART RATE: 72 BPM | HEIGHT: 71 IN | OXYGEN SATURATION: 99 % | BODY MASS INDEX: 21.27 KG/M2 | TEMPERATURE: 97.4 F | RESPIRATION RATE: 19 BRPM | SYSTOLIC BLOOD PRESSURE: 152 MMHG | WEIGHT: 151.9 LBS

## 2024-05-28 DIAGNOSIS — K13.79 MUCOCELE OF MOUTH: ICD-10-CM

## 2024-05-28 DIAGNOSIS — Z12.11 SCREEN FOR COLON CANCER: Primary | ICD-10-CM

## 2024-05-28 DIAGNOSIS — F90.0 ATTENTION DEFICIT HYPERACTIVITY DISORDER (ADHD), PREDOMINANTLY INATTENTIVE TYPE: ICD-10-CM

## 2024-05-28 DIAGNOSIS — M72.0 DUPUYTREN'S CONTRACTURE OF BOTH HANDS: ICD-10-CM

## 2024-05-28 DIAGNOSIS — Z23 ENCOUNTER FOR IMMUNIZATION: ICD-10-CM

## 2024-05-28 DIAGNOSIS — I10 BENIGN ESSENTIAL HYPERTENSION: ICD-10-CM

## 2024-05-28 LAB
ALBUMIN SERPL BCG-MCNC: 4.2 G/DL (ref 3.5–5.2)
ALP SERPL-CCNC: 74 U/L (ref 40–150)
ALT SERPL W P-5'-P-CCNC: 20 U/L (ref 0–70)
ANION GAP SERPL CALCULATED.3IONS-SCNC: 8 MMOL/L (ref 7–15)
AST SERPL W P-5'-P-CCNC: 22 U/L (ref 0–45)
BILIRUB SERPL-MCNC: 0.2 MG/DL
BUN SERPL-MCNC: 19.9 MG/DL (ref 8–23)
CALCIUM SERPL-MCNC: 9 MG/DL (ref 8.8–10.2)
CHLORIDE SERPL-SCNC: 104 MMOL/L (ref 98–107)
CREAT SERPL-MCNC: 0.79 MG/DL (ref 0.67–1.17)
DEPRECATED HCO3 PLAS-SCNC: 26 MMOL/L (ref 22–29)
EGFRCR SERPLBLD CKD-EPI 2021: >90 ML/MIN/1.73M2
GLUCOSE SERPL-MCNC: 97 MG/DL (ref 70–99)
POTASSIUM SERPL-SCNC: 4.6 MMOL/L (ref 3.4–5.3)
PROT SERPL-MCNC: 6.6 G/DL (ref 6.4–8.3)
SODIUM SERPL-SCNC: 138 MMOL/L (ref 135–145)

## 2024-05-28 PROCEDURE — G0439 PPPS, SUBSEQ VISIT: HCPCS | Performed by: FAMILY MEDICINE

## 2024-05-28 PROCEDURE — G0009 ADMIN PNEUMOCOCCAL VACCINE: HCPCS | Performed by: FAMILY MEDICINE

## 2024-05-28 PROCEDURE — 80053 COMPREHEN METABOLIC PANEL: CPT | Performed by: FAMILY MEDICINE

## 2024-05-28 PROCEDURE — 99214 OFFICE O/P EST MOD 30 MIN: CPT | Mod: 25 | Performed by: FAMILY MEDICINE

## 2024-05-28 PROCEDURE — 36415 COLL VENOUS BLD VENIPUNCTURE: CPT | Performed by: FAMILY MEDICINE

## 2024-05-28 PROCEDURE — 90677 PCV20 VACCINE IM: CPT | Performed by: FAMILY MEDICINE

## 2024-05-28 RX ORDER — LISINOPRIL 20 MG/1
20 TABLET ORAL DAILY
Qty: 90 TABLET | Refills: 2 | Status: SHIPPED | OUTPATIENT
Start: 2024-05-28

## 2024-05-28 RX ORDER — RESPIRATORY SYNCYTIAL VIRUS VACCINE 120MCG/0.5
0.5 KIT INTRAMUSCULAR ONCE
Qty: 1 EACH | Refills: 0 | Status: CANCELLED | OUTPATIENT
Start: 2024-05-28 | End: 2024-05-28

## 2024-05-28 SDOH — HEALTH STABILITY: PHYSICAL HEALTH: ON AVERAGE, HOW MANY DAYS PER WEEK DO YOU ENGAGE IN MODERATE TO STRENUOUS EXERCISE (LIKE A BRISK WALK)?: 3 DAYS

## 2024-05-28 ASSESSMENT — LIFESTYLE VARIABLES
HOW OFTEN DO YOU HAVE A DRINK CONTAINING ALCOHOL: NEVER
HOW OFTEN DO YOU HAVE SIX OR MORE DRINKS ON ONE OCCASION: NEVER
SKIP TO QUESTIONS 9-10: 1
HOW MANY STANDARD DRINKS CONTAINING ALCOHOL DO YOU HAVE ON A TYPICAL DAY: PATIENT DOES NOT DRINK
AUDIT-C TOTAL SCORE: 0

## 2024-05-28 ASSESSMENT — SOCIAL DETERMINANTS OF HEALTH (SDOH)
DO YOU BELONG TO ANY CLUBS OR ORGANIZATIONS SUCH AS CHURCH GROUPS UNIONS, FRATERNAL OR ATHLETIC GROUPS, OR SCHOOL GROUPS?: NO
IN A TYPICAL WEEK, HOW MANY TIMES DO YOU TALK ON THE PHONE WITH FAMILY, FRIENDS, OR NEIGHBORS?: THREE TIMES A WEEK
HOW OFTEN DO YOU ATTENT MEETINGS OF THE CLUB OR ORGANIZATION YOU BELONG TO?: PATIENT DECLINED
ARE YOU MARRIED, WIDOWED, DIVORCED, SEPARATED, NEVER MARRIED, OR LIVING WITH A PARTNER?: LIVING WITH PARTNER
HOW OFTEN DO YOU GET TOGETHER WITH FRIENDS OR RELATIVES?: TWICE A WEEK

## 2024-05-28 NOTE — COMMUNITY RESOURCES LIST (ENGLISH)
May 28, 2024           YOUR PERSONALIZED LIST OF SERVICES & PROGRAMS           NAVIGATION    Eligibility Screening      Action Partnership (CAP) [facility] application assistance  [address] [place] [address] (Distance: 13.0 miles)  Language: English, Swedish  Fee: Free      [facility] Registration Assistance  [address] [place] [address]52 (Distance: 2.4 miles)  Phone: [phone]) [phone]  Website: http://www.E-House  Language: English  Fee: Free  Accessibility: Ada accessible      [employer] [place] - SNAP (formerly food stamps) Screening and Application help  Phone: (978) 568-2927  Website: https://www.[employer]ic.org/programs/[place]-food-helpline/  Language: English  Hours: Mon 10:00 AM - 5:00 PM Tue 10:00 AM - 5:00 PM Wed 10:00 AM - 5:00 PM Thu 10:00 AM - 5:00 PM Fri 10:00 AM - 5:00 PM  Fee: Free  Accessibility: Ada accessible, Blind accommodation, Deaf or hard of hearing, Translation services        ASSISTANCE    Nutrition Benefits      Action Partnership (CAP) [facility] application assistance  [address] [place] [address]16 (Distance: 13.0 miles)  Language: English, Swedish  Fee: Free      [facility] Registration Assistance  [address] [place] [address] (Distance: 2.4 miles)  Phone: (555) 567-5874  Website: http://www.A2Zlogix.PM Pediatrics  Language: English  Fee: Free  Accessibility: Ada accessible      [employer] [place] - SNAP (formerly food stamps) Screening and Application help  Phone: (394) 794-2250  Website: https://www.[employer].org/programs/mn-food-helpline/  Language: English  Hours: Mon 10:00 AM - 5:00 PM Tue 10:00 AM - 5:00 PM Wed 10:00 AM - 5:00 PM Thu 10:00 AM - 5:00 PM Fri 10:00 AM - 5:00 PM  Fee: Free  Accessibility: Ada accessible, Blind accommodation, Deaf or hard of hearing, Translation services    Pantry      [employer] - [place] - [employer], [place]  [address] [place] [address] (Distance: 15.7 miles)  Phone: [phone]) [phone]  Website: https://AutoMoneyBack/programs/  Language: English      [employer] - [place] - [facility] [employer]  [address] [place] [address] (Distance: 15.7 miles)  Phone: [phone]) [phone]  Website: https://AutoMoneyBack/make-a-food-appointment/  Language: English      [facility]  Phone: (771) 521-8388  Website: https://www.Breaktime Studios  Language: English, Austrian  Hours: Mon 8:00 AM - 5:00 PM Tue 8:00 AM - 5:00 PM Wed 8:00 AM - 5:00 PM Thu 8:00 AM - 5:00 PM Fri 8:00 AM - 5:00 PM  Fee: Free  Accessibility: Ada accessible               IMPORTANT NUMBERS & WEBSITES        Emergency Services  911  .   [facility]  211 http://211unitedway.org  .   Poison Control  (568) 118-9521 http://mnpoison.org http://[place]poison.org  .     Suicide and Crisis Lifeline  988 http://988lifeline.org  .   Childhelp [place] Child Abuse Hotline  325.216.5714 http://Childhelphotline.org   .   [place] Sexual Assault Hotline  (781) 609-9026 (HOPE) http://Rainn.org   .     National Runaway Safeline  (319) 869-1919 (RUNAWAY) http://Yava TechnologiesrunaBlekko.org  .   Pregnancy & Postpartum Support  Call/text 696-411-1536  [place]: http://ppsupportmn.org  [place]: http://NCLC.com/wi  .   Substance Abuse National Helpline ([facility])  882-448-HELP (8340) http://Findtreatment.gov   .                DISCLAIMER: These resources have been generated via the LastRoom Platform. LastRoom does not endorse any service providers mentioned in this resource list. LastRoom does not guarantee that the services mentioned in this resource list will be available to you or will improve your health or wellness.    [facility]

## 2024-05-28 NOTE — PROGRESS NOTES
"Preventive Care Visit  [facility]  Leon Moss MD, Family Medicine  May 28, 2024      Assessment & Plan   Problem List Items Addressed This Visit       Attention deficit hyperactivity disorder (ADHD), predominantly inattentive type     Longstanding diagnosis.  Current medications effective and well-tolerated.  Weight stable.  Continue         Benign essential hypertension      slightly over goal.  Increase ACE         Relevant Medications    lisinopril (ZESTRIL) 20 MG tablet    Other Relevant Orders    COMPREHENSIVE METABOLIC PANEL (Completed)    Dupuytren's contracture of both hands     Medial fingers of his right hand are becoming contracted.  He is considering just \"living with it\".  I recommend that he pursue treatment refer         Relevant Orders    Orthopedic  Referral    Encounter for immunization     Offered         Relevant Orders    Pneumococcal 20 Valent Conjugate (PCV20) (Completed)    Mucocele of mouth     He has a papule on his posterior pharynx right side.  Suspect mucocele.  Should be investigated.  Refer         Relevant Orders    Adult ENT  Referral    Screen for colon cancer - Primary     He is due         Relevant Orders    Colonoscopy Screening  Referral        Patient has been advised of split billing requirements and indicates understanding: Yes       Counseling  Appropriate preventive services were discussed with this patient, including applicable screening as appropriate for fall prevention, nutrition, physical activity, Tobacco-use cessation, weight loss and cognition.  Checklist reviewing preventive services available has been given to the patient.  Reviewed patient's diet, addressing concerns and/or questions.   He is at risk for lack of exercise and has been provided with information to increase physical activity for the benefit of his well-being.   The patient was instructed to see the dentist every 6 months.   He is at risk for " psychosocial distress and has been provided with information to reduce risk.           Joby Saini is a 63 year old, presenting for the following:  Wellness Visit        5/28/2024    10:20 AM   Additional Questions   Roomed by Carleen Antonio       Health Care Directive  Patient does not have a Health Care Directive or Living Will:     HPI              5/28/2024   General Health   How would you rate your overall physical health? Good   Feel stress (tense, anxious, or unable to sleep) Only a little    Patient declined   (!) STRESS CONCERN      5/28/2024   Nutrition   Diet: Regular (no restrictions)         5/28/2024   Exercise   Days per week of moderate/strenous exercise 3 days    3 days         5/28/2024   Social Factors   Frequency of gathering with friends or relatives Twice a week   Worry food won't last until get money to buy more Yes    Yes   Food not last or not have enough money for food? No    Yes   Do you have housing?  Yes    Yes   Are you worried about losing your housing? No    No   Lack of transportation? No    No   Unable to get utilities (heat,electricity)? No    No   (!) FOOD SECURITY CONCERN PRESENT      5/28/2024   Fall Risk   Fallen 2 or more times in the past year? No    No   Trouble with walking or balance? No    No          5/28/2024   Activities of Daily Living- Home Safety   Needs help with the following daily activites None of the above   Safety concerns in the home None of the above         5/28/2024   Dental   Dentist two times every year? (!) NO         5/28/2024   Hearing Screening   Hearing concerns? None of the above         5/28/2024   Driving Risk Screening   Patient/family members have concerns about driving No         5/28/2024   General Alertness/Fatigue Screening   Have you been more tired than usual lately? No         5/28/2024   Urinary Incontinence Screening   Bothered by leaking urine in past 6 months No         5/28/2024   TB Screening   Were you born outside of the  [place]? No         Today's PHQ-2 Score:       2024    10:17 AM   PHQ-2 (  [employer])   Q1: Little interest or pleasure in doing things 0   Q2: Feeling down, depressed or hopeless 0   PHQ-2 Score 0   Q1: Little interest or pleasure in doing things Not at all   Q2: Feeling down, depressed or hopeless Not at all   PHQ-2 Score 0           2024   Substance Use   Alcohol more than 3/day or more than 7/wk Not Applicable   Do you have a current opioid prescription? No   How severe/bad is pain from 1 to 10? 6/10   Do you use any other substances recreationally? No    (!) DECLINE     Social History     Tobacco Use    Smoking status: Former     Current packs/day: 0.00     Average packs/day: 0.5 packs/day for 4.0 years (2.0 ttl pk-yrs)     Types: Cigarettes     Start date: 2013     Quit date: 2017     Years since quittin.5    Smokeless tobacco: Never   Vaping Use    Vaping status: Never Used   Substance Use Topics    Alcohol use: No    Drug use: No     Comment: clean 18 months       Last PSA:   Prostate Specific Antigen Screen   Date Value Ref Range Status   2023 3.59 0.00 - 4.50 ng/mL Final     ASCVD Risk   The 10-year ASCVD risk score (Vaughn DK, et al., 2019) is: 22.3%    Values used to calculate the score:      Age: 63 years      Sex: Male      Is Non- : No      Diabetic: No      Tobacco smoker: No      Systolic Blood Pressure: 152 mmHg      Is BP treated: Yes      HDL Cholesterol: 49 mg/dL      Total Cholesterol: 241 mg/dL            Reviewed and updated as needed this visit by Provider           Sexual Activity            Current providers sharing in care for this patient include:  Patient Care Team:  Leon Moss MD as PCP - General (Family Practice)  Leon Moss MD as Assigned PCP  Kimberly Casarez MSW as Lead Care Coordinator (Primary Care - CC)    The following health maintenance items are reviewed in Epic and correct as of today:  Health Maintenance   Topic  "Date Due    RSV VACCINE (Pregnancy & 60+) (1 - 1-dose 60+ series) Never done    COLORECTAL CANCER SCREENING  09/06/2021    COVID-19 Vaccine (4 - 2023-24 season) 09/01/2023    MEDICARE ANNUAL WELLNESS VISIT  05/18/2024    A1C  11/01/2024    LIPID  11/01/2024    URINE DRUG SCREEN  11/01/2024    ANNUAL REVIEW OF HM ORDERS  11/01/2024    CMP  05/28/2025    FALL RISK ASSESSMENT  05/28/2025    DTAP/TDAP/TD IMMUNIZATION (3 - Td or Tdap) 10/05/2026    GLUCOSE  05/28/2027    ADVANCE CARE PLANNING  05/19/2028    HEPATITIS C SCREENING  Completed    HIV SCREENING  Completed    PHQ-2 (once per calendar year)  Completed    Pneumococcal Vaccine: 65+ Years  Completed    ZOSTER IMMUNIZATION  Completed    AORTIC ANEURYSM SCREENING (SYSTEM ASSIGNED)  Completed    IPV IMMUNIZATION  Aged Out    HPV IMMUNIZATION  Aged Out    MENINGITIS IMMUNIZATION  Aged Out    RSV MONOCLONAL ANTIBODY  Aged Out    INFLUENZA VACCINE  Discontinued    LUNG CANCER SCREENING  Discontinued       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  He feels that things will be looking up from now on.  Arthritic complaints as described.  ADD as described.  Otherwise a complete review of systems is negative     Objective    Exam  BP (!) 152/82 (BP Location: Right arm, Patient Position: Sitting, Cuff Size: Adult Regular)   Pulse 72   Temp 97.4  F (36.3  C) (Oral)   Resp 19   Ht 1.803 m (5' 11\")   Wt 68.9 kg (151 lb 14.4 oz)   SpO2 99%   BMI 21.19 kg/m     Estimated body mass index is 21.19 kg/m  as calculated from the following:    Height as of this encounter: 1.803 m (5' 11\").    Weight as of this encounter: 68.9 kg (151 lb 14.4 oz).    Physical Exam  Constitutional:       Appearance: Normal appearance.   HENT:      Head: Atraumatic.      Right Ear: Tympanic membrane normal.      Left Ear: Tympanic membrane normal.      Nose: Nose normal.      Mouth/Throat:      Mouth: Mucous membranes are moist.   Eyes:      Pupils: Pupils are " equal, round, and reactive to light.   Cardiovascular:      Rate and Rhythm: Normal rate and regular rhythm.      Heart sounds: Normal heart sounds.   Pulmonary:      Effort: Pulmonary effort is normal.      Breath sounds: Normal breath sounds.   Abdominal:      General: Abdomen is flat. Bowel sounds are normal.   Musculoskeletal:      Cervical back: Neck supple.      Comments: As described   Skin:     General: Skin is warm and dry.   Neurological:      General: No focal deficit present.      Mental Status: He is alert.   Psychiatric:         Mood and Affect: Mood normal.      Comments: Mentation scattered               5/28/2024   Mini Cog   Clock Draw Score 2 Normal   3 Item Recall 2 objects recalled   Mini Cog Total Score 4              Signed Electronically by: Leon Moss MD

## 2024-05-28 NOTE — COMMUNITY RESOURCES LIST (ENGLISH)
May 28, 2024           YOUR PERSONALIZED LIST OF SERVICES & PROGRAMS           NAVIGATION    Eligibility Screening      [employer] (CAP) [facility] application assistance  [address] [place] [address] (Distance: 13.0 miles)  Language: English, Nepali  Fee: Free      [facility] Registration Assistance  [address] [place] [address] (Distance: 2.4 miles)  Phone: [phone]) [phone]  Website: http://www.Guvera  Language: English  Fee: Free  Accessibility: Ada accessible      [employer] [place] - SNAP (formerly food stamps) Screening and Application help  Phone: (133) 548-1444  Website: https://www.InRiver.org/programs/[place]-food-helpline/  Language: English  Hours: Mon 10:00 AM - 5:00 PM Tue 10:00 AM - 5:00 PM Wed 10:00 AM - 5:00 PM Thu 10:00 AM - 5:00 PM Fri 10:00 AM - 5:00 PM  Fee: Free  Accessibility: Ada accessible, Blind accommodation, Deaf or hard of hearing, Translation services        ASSISTANCE    Nutrition Benefits      Action Partnership (CAP) [facility] application assistance  [address] [place] [address] (Distance: 13.0 miles)  Language: English, Nepali  Fee: Free      [facility] Registration Assistance  [address] [place] [address] (Distance: 2.4 miles)  Phone: (549) [phone]  Website: http://www.La Nevera Roja.com.[employer] Rewards  Language: English  Fee: Free  Accessibility: Ada accessible      [employer] [place] - SNAP (formerly food stamps) Screening and Application help  Phone: (623) 732-9561  Website: https://www.InRiver.org/programs/mn-food-helpline/  Language: English  Hours: Mon 10:00 AM - 5:00 PM Tue 10:00 AM - 5:00 PM Wed 10:00 AM - 5:00 PM Thu 10:00 AM - 5:00 PM Fri 10:00 AM - 5:00 PM  Fee: Free  Accessibility: Ada accessible, Blind accommodation, Deaf or hard of hearing, Translation services    Pantry      [employer] - [place] - [employer], [place]  [address] [place] [address] (Distance: 15.7 miles)  Phone: [phone]) [phone]  Website: https://RealtyAPX/programs/  Language: English      [employer] - [place] - [facility] [employer]  [address] [place] [address] (Distance: 15.7 miles)  Phone: [phone]) [phone]  Website: https://RealtyAPX/make-a-food-appointment/  Language: English      [facility]  Phone: (585) 327-5624  Website: https://www.FedCyber  Language: English, Rwandan  Hours: Mon 8:00 AM - 5:00 PM Tue 8:00 AM - 5:00 PM Wed 8:00 AM - 5:00 PM Thu 8:00 AM - 5:00 PM Fri 8:00 AM - 5:00 PM  Fee: Free  Accessibility: Ada accessible               IMPORTANT NUMBERS & WEBSITES        Emergency Services  911  .   [facility]  211 http://211unitedway.org  .   Poison Control  (674) 769-9448 http://mnpoison.org http://wisconsinpoison.org  .     Suicide and Crisis Lifeline  988 http://988lifeline.org  .   Childhelp [place] Child Abuse Hotline  428.861.2907 http://Childhelphotline.org   .   [place] Sexual Assault Hotline  (488) 389-3040 (HOPE) http://Rainn.org   .     National Runaway Safeline  (314) 974-2231 (RUNAWAY) http://KnowFurunaSinopsys Surgical.org  .   Pregnancy & Postpartum Support  Call/text 816-152-6273  [place]: http://ppsupportmn.org  [place]: http://PerfectServe.com/wi  .   Substance Abuse National Helpline ([facility])  225-424-HELP (6042) http://Findtreatment.gov   .                DISCLAIMER: These resources have been generated via the SPOOTNIC.COM Platform. SPOOTNIC.COM does not endorse any service providers mentioned in this resource list. SPOOTNIC.COM does not guarantee that the services mentioned in this resource list will be available to you or will improve your health or wellness.    [facility]

## 2024-05-28 NOTE — PROGRESS NOTES
Prior to immunization administration, verified patients identity using patient s name and date of birth. Please see Immunization Activity for additional information.     Screening Questionnaire for Adult Immunization    Are you sick today?   No   Do you have allergies to medications, food, a vaccine component or latex?   No   Have you ever had a serious reaction after receiving a vaccination?   No   Do you have a long-term health problem with heart, lung, kidney, or metabolic disease (e.g., diabetes), asthma, a blood disorder, no spleen, complement component deficiency, a cochlear implant, or a spinal fluid leak?  Are you on long-term aspirin therapy?   No   Do you have cancer, leukemia, HIV/AIDS, or any other immune system problem?   No   Do you have a parent, brother, or sister with an immune system problem?   No   In the past 3 months, have you taken medications that affect  your immune system, such as prednisone, other steroids, or anticancer drugs; drugs for the treatment of rheumatoid arthritis, Crohn s disease, or psoriasis; or have you had radiation treatments?   No   Have you had a seizure, or a brain or other nervous system problem?   No   During the past year, have you received a transfusion of blood or blood    products, or been given immune (gamma) globulin or antiviral drug?   No   For women: Are you pregnant or is there a chance you could become       pregnant during the next month?   No   Have you received any vaccinations in the past 4 weeks?   No     Immunization questionnaire answers were all negative.      Patient instructed to remain in clinic for 15 minutes afterwards, and to report any adverse reactions.     Screening performed by Aylin Sykes CMA on 5/28/2024 at 10:58 AM.

## 2024-05-29 NOTE — ASSESSMENT & PLAN NOTE
"Medial fingers of his right hand are becoming contracted.  He is considering just \"living with it\".  I recommend that he pursue treatment refer  "

## 2024-05-29 NOTE — ASSESSMENT & PLAN NOTE
Longstanding diagnosis.  Current medications effective and well-tolerated.  Weight stable.  Continue

## 2024-05-29 NOTE — ASSESSMENT & PLAN NOTE
He has a papule on his posterior pharynx right side.  Suspect mucocele.  Should be investigated.  Refer

## 2024-06-06 DIAGNOSIS — F90.0 ATTENTION DEFICIT HYPERACTIVITY DISORDER (ADHD), PREDOMINANTLY INATTENTIVE TYPE: ICD-10-CM

## 2024-06-06 RX ORDER — METHYLPHENIDATE HYDROCHLORIDE 20 MG/1
TABLET ORAL
Qty: 150 TABLET | Refills: 0 | Status: SHIPPED | OUTPATIENT
Start: 2024-07-06 | End: 2024-09-03

## 2024-06-06 RX ORDER — METHYLPHENIDATE HYDROCHLORIDE 20 MG/1
TABLET ORAL
Qty: 150 TABLET | Refills: 0 | Status: SHIPPED | OUTPATIENT
Start: 2024-08-05 | End: 2024-09-03

## 2024-06-06 RX ORDER — METHYLPHENIDATE HYDROCHLORIDE 20 MG/1
TABLET ORAL
Qty: 150 TABLET | Refills: 0 | OUTPATIENT
Start: 2024-06-06

## 2024-06-06 RX ORDER — METHYLPHENIDATE HYDROCHLORIDE 20 MG/1
TABLET ORAL
Qty: 150 TABLET | Refills: 0 | Status: SHIPPED | OUTPATIENT
Start: 2024-06-06 | End: 2024-09-03

## 2024-06-24 NOTE — PROGRESS NOTES
SUBJECTIVE:   Parveen Flor is a 57 year old male who presents to clinic today for the following health issues:      Numbness   Associated symptoms include numbness.   History of Present Illness   Taking medications regularly:  Yes  Additional concerns today:  No      Hypoesthesia of skin  (primary encounter diagnosis): Patient complains of numbness going down his right leg , has had for 3-4 months.  He believes this began after a drumming session which involves sitting.  Painless    Osteoarthritis of thumbs, bilateral: The patient went to hand therapy and plans to get braces for his hands in a few days.     Leukoplakia of tongue: Patient saw ENT, he was told the lump in his throat was just a cyst.  We do not have records    Sebaceous cyst: Patient notes several cysts on his abdomen, wanted to make sure they were benign.  He has had a few removed      Problem list and histories reviewed & adjusted, as indicated.  Additional history: none  Past Medical History:   Diagnosis Date     Attention deficit disorder, unspecified hyperactivity presence 3/1/2018     Bilateral low back pain without sciatica, unspecified chronicity 3/1/2018     Chemical dependency (H)     Sober x 7 years.     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 No pertinent surgical history.    Family History   Problem Relation Age of Onset     HEART DISEASE Paternal Grandmother      Arthritis Paternal Grandmother      Alcohol/Drug Maternal Grandfather      CANCER Maternal Grandmother      Liver       Social History   Substance Use Topics     Smoking status: Former Smoker     Packs/day: 0.50     Years: 4.00     Types: Cigarettes     Quit date: 11/2017     Smokeless tobacco:  Operative Report     Side/SHOULDER: RIGHT SHOULDER    LOCATION:   [facility]  [address] [place] [address]    [facility] OPERATIVE REPORT    Surgeon: Jayro Williamson MD     Date of Surgery: 6/24/2024  Shoulder: RIGHT shoulder   Preoperative Diagnosis:  1.     Rotator cuff tear chronic full-thickness rotator cuff tearing supraspinatus- treated with arthroscopic rotator cuff repair - CPT 09339  2.     Shoulder impingement syndrome with significant CA ligament tearing- treated with arthroscopic subacromial decompression with acromioplasty - CPT 32074     Postoperative Diagnosis:  1.     SAME as preoperative diagnoses; long head of the biceps intact, small SLAP tear, no indication for biceps tenodesis     Procedure:  1.     Arthroscopic rotator cuff repair (1x2) - CPT 06675  2.     Arthroscopic subacromial decompression with acromioplasty - CPT 94237        Admission status: elective outpatient surgery    Complications: None     EBL: 10mL     Specimen: NONE     Anesthesia: general anesthesia     Block: regional interscalene block with single shot per anesthesia     Antibiotics: weight based IV antibiotics infused prior to incision     Time out: Time out was called and the patient, side, site, and intended procedures were confirmed.     Counts: Needle and sponge counts were correct prior to closure.     DVT prophylaxis: The patient will be weight bearing as tolerated on bilateral lower extremities postoperatively with no additional chemical DVT prophylaxis indicated.     Marked: The patient was marked with an indelible marker in the preoperative holding area confirming the correct side and site.     History & Physical:   A history and physical examination was completed and updated in the preoperative holding area.     Consent: The patient signed the consent form for surgery with an understanding of the risks and benefits as outlined in the clinic  "Never Used     Alcohol use No               ROS:  No synovitis no back pain    This document serves as a record of the services and decisions personally performed and made by Leon Moss MD. It was created on his behalf by Princess Haddad, a trained medical scribe.  The creation of this document is based on the scribe's personal observations and the provider's statements to the medical scribe.  Princess Haddad, April 2, 2018 4:39 PM    OBJECTIVE:     /76 (BP Location: Right arm, Patient Position: Chair, Cuff Size: Adult Regular)  Pulse 68  Temp 97.9  F (36.6  C) (Oral)  Resp 16  Ht 1.803 m (5' 11\")  Wt 76.2 kg (168 lb)  BMI 23.43 kg/m2  Body mass index is 23.43 kg/(m^2).    Skin:Numerous hard mobile subcutaneous cysts    ASSESSMENT/PLAN:     ASSESSMENT / PLAN:  (R20.1) Hypoesthesia of skin  (primary encounter diagnosis)  Comment: Evaluate objectively  Plan: Vitamin B12, Folate, TSH with free T4 reflex,         Anti Treponema, NEUROLOGY ADULT REFERRAL            (M18.11,  M18.12) Osteoarthritis of thumbs, bilateral  Comment: He has seen hand therapy  Plan:     (K13.21) Leukoplakia of tongue  Comment: Benign by report      (L72.3) Sebaceous cyst  Comment: These are numerous, elective excision can be undertaken, but is not necessary  Discussed      RTCpost, 2 mos      The information in this document, created by the medical scribe for me, accurately reflects the services I personally performed and the decisions made by me. I have reviewed and approved this document for accuracy prior to leaving the patient care area.  Leon Moss MD April 2, 2018 4:38 PM    Leon Moss MD  [facility]  Answers for HPI/ROS submitted by the patient on 4/2/2018   PHQ-2 Score: 0    " and in the preoperative holding area.     Risks and Benefits: Specific risks and benefits discussed included - pain, bleeding, infection, injury to nerves or blood vessels, fracture, stiffness, failure to heal, revision surgery, deformity of biceps or asymmetry, complications of the block, anesthetic complications, and medical complications associated with surgery.       Indications for surgery:  The patient has history, physical examination, and radiographic findings confirming the diagnoses.  The patient has persistent pain and functional loss unresponsive to non-operative treatment consisting of selective rest/activity modification, medications, and exercises.  MRI documented the status of the rotator cuff - rotator cuff tearing was noted.     Impingement syndrome - the patient had history and clinical exam findings consistent with shoulder impingement syndrome.  Additionally, the patient had subacromial bursitis which was encountered during the arthroscopic shoulder procedure.  Subacromial decompression with minimal acromioplasty was indicated as part of the treatment of impingement syndrome, and additionally to enhance arthroscopic visualization to enable minimally invasive, arthroscopic rotator cuff repair.    Excellent discussion with the patient in preop today and in the clinic prior.  He had desire to hold off on surgery until he got back from a very important international trip.  The trip went well.  He desired to proceed with surgery.  Supportive wife present in preop as well.        Operative Findings:  Rotator Cuff Tissue Quality: Chronic full-thickness rotator cuff tearing     Status of the Rotator Cuff:  Supraspinatus -full-thickness tear     Size of Rotator Cuff Tear:  Supraspinatus -12 to 13 mm     Rotator Cuff Repair Construct:  1x2 Transosseous Equivalent Double Row Arthroscopic Rotator Cuff Repair      Rotator Cuff Implants:  Medial Row: 1 triple loaded [employer] 5.5mm Healicoil PEEK    Lateral Row: 2 Lateral Anchors - [employer] 5.5mm Footprint PEEK      Bone Quality: Very hard quality bone for the medial anchor which necessitated an awl and a tap and very hard bone laterally which necessitated an awl for both lateral row anchors     Labrum: Mild degenerative superior labral fraying, no indication for biceps tenodesis as the biceps was well-appearing     Humeral Head: Negative  Glenoid: Mild degenerative changes on the glenoid surface and small SLAP fraying     Contracture: Negative  Pathological laxity: Negative     Procedure in detail:  Regional interscalene block was completed in the preoperative area by the anesthesia team.  The patient was supine on the operative table and the anesthesia team initiated general anesthesia.  The patient was placed in a modified beach chair position with the neck secured in neutral alignment and all bony prominences were well padded.     The shoulder was assessed with an examination under anesthesia.     The operative extremity was cleaned with alcohol and then the extremity was prepped and draped in the standard fashion.  The surgical arm was placed into a well-padded arm elliott. A standard posterior portal was created with an incision made 1 cm inferior 1 cm medial to the posterolateral acromial corner.  A blunt metal trocar and cannula were inserted into the glenohumeral joint.  The arthroscopic pump was connected and the above findings and diagnoses were noted as part of the diagnostic shoulder arthroscopy.       A spinal needle was used to localize the anterior portal position in the rotator interval. A 5.5mm plastic cannula and trocar were inserted followed by a 4.5mm shaver/cautery device.  The shaver was used for debridement in the glenohumeral joint.  The biceps tendon was closely inspected and biceps tenodesis was not warranted.     The arthroscope and metal cannula were then removed in order to transition to the subacromial space.  The blunt  metal trocar and cannula were inserted into the subacromial space and the lateral portal site identified with a spinal needle.  An 8 mm plastic cannula and trocar were inserted.  I turned my attention to the arthroscopic subacromial decompression with acromioplasty. Bursitis was noted in the subacromial space and impacted visualization of the rotator cuff.  The 4.5mm shaver/cautery device was used to clear the subacromial space until the acromion could be identified and bursal resection was completed to allow rotator cuff visualization.  The shaver was used to remove fibrous tissue from the acromial undersurface until the anterolateral and anterior medial corners of the acromion were clearly identified.  The coracoacromial ligament was not released but partial CA ligament tearing was noted and debrided as part of subacromial decompression.  The shaver was used to perform a minimal acromioplasty.  The shaver/cautery device was used to perform the subacromial decompression with minimal acromioplasty.      I turned my attention to the rotator cuff tear and the arthroscopic rotator cuff repair.  The torn rotator cuff tendon was grasped with a handheld grasper and assessed for mobility and integrity.  The soft tissue at the greater tuberosity insertion site was removed and a power shaver was used to create a healthy bleeding bed to enhance rotator cuff tendon healing.     Arthroscopic rotator cuff suture anchors were then inserted for the rotator cuff repair.  The single medial row arthroscopic rotator cuff repair anchor was inserted and the sutures from the anchors were withdrawn through the anterior cannula. An arthroscopic suture passer was used to place the high strength sutures through the rotator cuff tendon in an inverted mattress fashion. The sutures were then inserted laterally into 2 lateral knotless anchors in a cruciform pattern with appropriate tensioning.  Very hard bone quality noted.  The completed  arthroscopic rotator cuff repair was inspected dynamically and the arthroscopic instruments removed along with the arthroscopic fluid. The incisions were closed with nylon suture and steri-strips were placed over the incisions.  A sterile dressing was applied followed by a neutral rotation sling.  The patient tolerated the procedure well and was transported to the recovery room in satisfactory condition.        Rotator Cuff Repair - POSTOPERATIVE PLAN:  Follow-up in the office in 2 weeks with 1 view of the operative shoulder at that time  Sutures: Nylon sutures to come out in 2 weeks  DVT prophylaxis: No additional chemical DVT prophylaxis indicated  Weightbearing: Nonweightbearing on operative extremity, no biceps loading, pendulums/elbow/wrist/hand motion encouraged  Neutral rotation sling for 3 to 4 weeks following the surgery  Physical therapy: Formal outpatient physical therapy will be provided at the 2-week appointment in the office.  Rotator cuff repair protocol      Electronically signed by Jayro Williamson MD, 06/24/24, 8:23 AM EDT.

## 2024-08-13 ENCOUNTER — OFFICE VISIT (OUTPATIENT)
Dept: ORTHOPEDICS | Facility: CLINIC | Age: 64
End: 2024-08-13
Attending: FAMILY MEDICINE
Payer: MEDICARE

## 2024-08-13 VITALS — HEIGHT: 71 IN | WEIGHT: 151 LBS | BODY MASS INDEX: 21.14 KG/M2

## 2024-08-13 DIAGNOSIS — M72.0 DUPUYTREN'S CONTRACTURE OF BOTH HANDS: ICD-10-CM

## 2024-08-13 DIAGNOSIS — M18.0 OSTEOARTHRITIS OF CARPOMETACARPAL (CMC) JOINT OF BOTH THUMBS: Primary | ICD-10-CM

## 2024-08-13 DIAGNOSIS — G56.03 BILATERAL CARPAL TUNNEL SYNDROME: ICD-10-CM

## 2024-08-13 PROCEDURE — 99203 OFFICE O/P NEW LOW 30 MIN: CPT | Performed by: STUDENT IN AN ORGANIZED HEALTH CARE EDUCATION/TRAINING PROGRAM

## 2024-08-13 NOTE — PATIENT INSTRUCTIONS
Thank you for choosing [facility]    Dr. Rowley Locations:    [facility] [address], [address]  [place], [place] [address] [place] [id number]   Appointments: [phone] Appointments: [phone]   Fax: [phone] Fax: [phone]   Follow up: after Emg is completed     Hand Therapy orders have been placed with [facility] (formally [facility])  You can call [phone] to schedule at your convenience.       An EMG referral was placed today at the .       Please call [phone] to schedule your follow up appointment.

## 2024-08-13 NOTE — LETTER
8/13/2024      Parveen Mcdermott  [facility] [address]      Dear Colleague,    Thank you for referring your patient, Parveen Flor, to the [facility] [place]. Please see a copy of my visit note below.    Orthopaedic Surgery Hand and Upper Extremity Clinic H&P Note:  Date: Aug 13, 2024     Patient Name: Parveen Flor  MRN: [id number]    Consult requested by: Leon Moss    CHIEF COMPLAINT: Dupuytren's contracture of both hands    Dominant Hand: Right hand  Occupation: Retired/Disability      HPI:  Mr. Parveen Flor is a 63 year old male right hand dominant who presents with bilateral hand pain and dupuytren's contracture of both hands.      Patient reports that he first noticed the contracture about 2 years ago and it continues to worsen. He reports that the right hand is worse than the left because his right hand is more dominant. It is painful all the time but he is able to manage it.  Pain is located primarily at the wrist and base of the thumb.  He reports that there was no injury or precipitating injury. He has tried custom braces and cortisone injections previously.  He no longer has the OT splints as they melted in the heat in the car.  Pain is worsened by lifting and use of the hands.  He does endorse numbness and tingling predominantly in the median nerve distribution.      PAST MEDICAL HISTORY:  Past Medical History:   Diagnosis Date     Attention deficit disorder, unspecified hyperactivity presence 3/1/2018     Benign essential hypertension 3/9/2022     Bilateral low back pain without sciatica, unspecified chronicity 3/1/2018     Chemical dependency (H)     Sober x 7 years.     Controlled substance agreement signed 3/1/2018     Dupuytren's contracture of both hands 3/1/2018     Elevated blood pressure reading without diagnosis of hypertension 11/13/2018     Elevated glucose 5/3/2018     Hyperlipidemia LDL goal <160 5/3/2018    The  10-year ASCVD risk score ([place]jon[place] GRIFFIN Jr, et al., 2013) is: 10.9%   Values used to calculate the score:     Age: 57 years     Sex: Male     Is Non- : No     Diabetic: No     Tobacco smoker: No     Systolic Blood Pressure: 135 mmHg     Is BP treated: No     HDL Cholesterol: 45 mg/dL     Total Cholesterol: 234 mg/dL      Learning disability 1/9/2020     Leukoplakia of tongue 3/1/2018     Mucous cyst of tonsil 5/1/2018     Osteoarthritis of thumbs, bilateral 3/1/2018     Primary osteoarthritis of both hands 12/28/2020     Rotator cuff syndrome, left 3/1/2018     Rotator cuff syndrome, right 3/1/2018     Sebaceous cyst 4/2/2018       PAST SURGICAL HISTORY:  No past surgical history on file.    MEDICATIONS:  Current Outpatient Medications   Medication Sig Dispense Refill     atorvastatin (LIPITOR) 40 MG tablet Take 1 tablet (40 mg) by mouth daily 90 tablet 3     IBUPROFEN PO        lisinopril (ZESTRIL) 20 MG tablet Take 1 tablet (20 mg) by mouth daily 90 tablet 2     methylphenidate (RITALIN) 20 MG tablet TAKE TWO TABLETS BY MOUTH EVERY MORNING, TAKE TWO TABLETS BY MOUTH DAILY AT MID DAY, AND TAKE ONE TABLET BY MOUTH DAILY AT BEDTIME, 150 tablet 0     methylphenidate (RITALIN) 20 MG tablet TAKE TWO TABLETS BY MOUTH EVERY MORNING, TAKE TWO TABLETS BY MOUTH DAILY AT MID DAY, AND TAKE ONE TABLET BY MOUTH DAILY AT BEDTIME, 150 tablet 0     methylphenidate (RITALIN) 20 MG tablet TAKE TWO TABLETS BY MOUTH EVERY MORNING, TAKE TWO TABLETS BY MOUTH DAILY AT MID DAY, AND TAKE ONE TABLET BY MOUTH DAILY AT BEDTIME, 150 tablet 0     No current facility-administered medications for this visit.       ALLERGIES:     Allergies   Allergen Reactions     No Known Drug Allergy        FAMILY HISTORY:  No pertinent family history    SOCIAL HISTORY:  Social History     Tobacco Use     Smoking status: Former     Current packs/day: 0.00     Average packs/day: 0.5 packs/day for 4.0 years (2.0 ttl pk-yrs)     Types: Cigarettes      Start date: 2013     Quit date: 2017     Years since quittin.7     Smokeless tobacco: Never   Vaping Use     Vaping status: Never Used   Substance Use Topics     Alcohol use: No     Drug use: No     Comment: clean 18 months       The patient's past medical, family, and social history was reviewed and confirmed.    REVIEW OF SYMPTOMS:      General: Negative   Eyes: Negative   Ear, Nose and Throat: Negative   Respiratory: Negative   Cardiovascular: Negative   Gastrointestinal: Negative   Genito-urinary: Negative   Musculoskeletal: see HPI  Neurological: Negative   Psychological: Negative  HEME: Negative   ENDO: Negative   SKIN: Negative    VITALS:  There were no vitals filed for this visit.    EXAM:  General: NAD, A&Ox3  HEENT: NC/AT  CV: RRR by peripheral pulse  Pulmonary: Non-labored breathing on RA  BUE:  Palpable pretendinous cords over the volar aspect of the ulnar palm causing approximately 45 degree flexion contracture of the small finger MCP joint and 20 degree contracture of the ring finger MCP joint on the right.  Same cord present on the left but less severe with approximately 2030 degree flexion contracture of the MCP joint of the small finger  Able make a full fist  Arthritic deformity and pain at the CMC joints, pain with CMC grind  Positive Tinel's and Durkan's compression test at bilateral carpal tunnels  Mild atrophy at the thenar eminences  Sensation is intact to light touch all distributions  Intact EPL, FPL, APB, hand intrinsics  Warm well-perfused, capillary fill less than 2 seconds       IMAGING:    X-rays of bilateral hands 3/14/2018 demonstrates advanced degenerative changes of bilateral thumb CMC joints.    I have personally reviewed the above images and labs.         IMPRESSION AND RECOMMENDATIONS:  Mr. Parveen Flor is a 63 year old male right hand dominant with bilateral hand Dupuytren's contractures, bilateral thumb CMC OA, and likely bilateral carpal tunnel  syndrome.    I discussed the diagnosis, prognosis, and treatment options for each.  For Dupuytren's contracture, we discussed both percutaneous needle aponeurotomy as well as subtotal palmar fasciectomy.  The patient does have a family history of Dupuytren's.  He is not particular eager to undergo any significant intervention at this point.    I have recommended return to hand therapy to obtain custom thermoplastic splints  And strengthen thumb CMC joints.  I do think he would benefit from injections in the future.    Regarding bilateral carpal tunnel syndrome, I recommended an EMG/NCS to further evaluate.  This has been ordered.    Follow-up with me after EMG.  All questions answered, the patient was understanding and agreement.      Cornell Rowley MD    Hand, Upper Extremity & Microvascular Surgery  Department of Orthopaedic Surgery  [facility]           Again, thank you for allowing me to participate in the care of your patient.        Sincerely,        Cornell Rowley MD

## 2024-08-13 NOTE — PROGRESS NOTES
[facility] H&P Note:  Date: Aug 13, 2024     Patient Name: Parveen Flor  MRN: [id number]    Consult requested by: Leon Moss    CHIEF COMPLAINT: Dupuytren's contracture of both hands    Dominant Hand: Right hand  Occupation: Retired/Disability      HPI:  Mr. Parveen Flor is a 63 year old male right hand dominant who presents with bilateral hand pain and dupuytren's contracture of both hands.      Patient reports that he first noticed the contracture about 2 years ago and it continues to worsen. He reports that the right hand is worse than the left because his right hand is more dominant. It is painful all the time but he is able to manage it.  Pain is located primarily at the wrist and base of the thumb.  He reports that there was no injury or precipitating injury. He has tried custom braces and cortisone injections previously.  He no longer has the OT splints as they melted in the heat in the car.  Pain is worsened by lifting and use of the hands.  He does endorse numbness and tingling predominantly in the median nerve distribution.      PAST MEDICAL HISTORY:  Past Medical History:   Diagnosis Date    Attention deficit disorder, unspecified hyperactivity presence 3/1/2018    Benign essential hypertension 3/9/2022    Bilateral low back pain without sciatica, unspecified chronicity 3/1/2018    Chemical dependency (H)     Sober x 7 years.    Controlled substance agreement signed 3/1/2018    Dupuytren's contracture of both hands 3/1/2018    Elevated blood pressure reading without diagnosis of hypertension 11/13/2018    Elevated glucose 5/3/2018    Hyperlipidemia LDL goal <160 5/3/2018    The 10-year ASCVD risk score ([place] ELIAS Jr, et al., 2013) is: 10.9%   Values used to calculate the score:     Age: 57 years     Sex: Male     Is Non- : No     Diabetic: No     Tobacco smoker: No     Systolic Blood Pressure: 135 mmHg     Is BP treated: No      HDL Cholesterol: 45 mg/dL     Total Cholesterol: 234 mg/dL     Learning disability 2020    Leukoplakia of tongue 3/1/2018    Mucous cyst of tonsil 2018    Osteoarthritis of thumbs, bilateral 3/1/2018    Primary osteoarthritis of both hands 2020    Rotator cuff syndrome, left 3/1/2018    Rotator cuff syndrome, right 3/1/2018    Sebaceous cyst 2018       PAST SURGICAL HISTORY:  No past surgical history on file.    MEDICATIONS:  Current Outpatient Medications   Medication Sig Dispense Refill    atorvastatin (LIPITOR) 40 MG tablet Take 1 tablet (40 mg) by mouth daily 90 tablet 3    IBUPROFEN PO       lisinopril (ZESTRIL) 20 MG tablet Take 1 tablet (20 mg) by mouth daily 90 tablet 2    methylphenidate (RITALIN) 20 MG tablet TAKE TWO TABLETS BY MOUTH EVERY MORNING, TAKE TWO TABLETS BY MOUTH DAILY AT MID DAY, AND TAKE ONE TABLET BY MOUTH DAILY AT BEDTIME, 150 tablet 0    methylphenidate (RITALIN) 20 MG tablet TAKE TWO TABLETS BY MOUTH EVERY MORNING, TAKE TWO TABLETS BY MOUTH DAILY AT MID DAY, AND TAKE ONE TABLET BY MOUTH DAILY AT BEDTIME, 150 tablet 0    methylphenidate (RITALIN) 20 MG tablet TAKE TWO TABLETS BY MOUTH EVERY MORNING, TAKE TWO TABLETS BY MOUTH DAILY AT MID DAY, AND TAKE ONE TABLET BY MOUTH DAILY AT BEDTIME, 150 tablet 0     No current facility-administered medications for this visit.       ALLERGIES:     Allergies   Allergen Reactions    No Known Drug Allergy        FAMILY HISTORY:  No pertinent family history    SOCIAL HISTORY:  Social History     Tobacco Use    Smoking status: Former     Current packs/day: 0.00     Average packs/day: 0.5 packs/day for 4.0 years (2.0 ttl pk-yrs)     Types: Cigarettes     Start date: 2013     Quit date: 2017     Years since quittin.7    Smokeless tobacco: Never   Vaping Use    Vaping status: Never Used   Substance Use Topics    Alcohol use: No    Drug use: No     Comment: clean 18 months       The patient's past medical, family, and social  history was reviewed and confirmed.    REVIEW OF SYMPTOMS:      General: Negative   Eyes: Negative   Ear, Nose and Throat: Negative   Respiratory: Negative   Cardiovascular: Negative   Gastrointestinal: Negative   Genito-urinary: Negative   Musculoskeletal: see HPI  Neurological: Negative   Psychological: Negative  HEME: Negative   ENDO: Negative   SKIN: Negative    VITALS:  There were no vitals filed for this visit.    EXAM:  General: NAD, A&Ox3  HEENT: NC/AT  CV: RRR by peripheral pulse  Pulmonary: Non-labored breathing on RA  BUE:  Palpable pretendinous cords over the volar aspect of the ulnar palm causing approximately 45 degree flexion contracture of the small finger MCP joint and 20 degree contracture of the ring finger MCP joint on the right.  Same cord present on the left but less severe with approximately 2030 degree flexion contracture of the MCP joint of the small finger  Able make a full fist  Arthritic deformity and pain at the CMC joints, pain with CMC grind  Positive Tinel's and Durkan's compression test at bilateral carpal tunnels  Mild atrophy at the thenar eminences  Sensation is intact to light touch all distributions  Intact EPL, FPL, APB, hand intrinsics  Warm well-perfused, capillary fill less than 2 seconds       IMAGING:    X-rays of bilateral hands 3/14/2018 demonstrates advanced degenerative changes of bilateral thumb CMC joints.    I have personally reviewed the above images and labs.         IMPRESSION AND RECOMMENDATIONS:  Mr. Parveen Flor is a 63 year old male right hand dominant with bilateral hand Dupuytren's contractures, bilateral thumb CMC OA, and likely bilateral carpal tunnel syndrome.    I discussed the diagnosis, prognosis, and treatment options for each.  For Dupuytren's contracture, we discussed both percutaneous needle aponeurotomy as well as subtotal palmar fasciectomy.  The patient does have a family history of Dupuytren's.  He is not particular eager to undergo  any significant intervention at this point.    I have recommended return to hand therapy to obtain custom thermoplastic splints  And strengthen thumb CMC joints.  I do think he would benefit from injections in the future.    Regarding bilateral carpal tunnel syndrome, I recommended an EMG/NCS to further evaluate.  This has been ordered.    Follow-up with me after EMG.  All questions answered, the patient was understanding and agreement.      Cornell Rowley MD    Hand, Upper Extremity & Microvascular Surgery  Department of Orthopaedic Surgery  [facility]

## 2024-08-27 ENCOUNTER — THERAPY VISIT (OUTPATIENT)
Dept: OCCUPATIONAL THERAPY | Facility: CLINIC | Age: 64
End: 2024-08-27
Attending: STUDENT IN AN ORGANIZED HEALTH CARE EDUCATION/TRAINING PROGRAM
Payer: MEDICARE

## 2024-08-27 DIAGNOSIS — M79.646 THUMB PAIN, UNSPECIFIED LATERALITY: Primary | ICD-10-CM

## 2024-08-27 DIAGNOSIS — M18.0 OSTEOARTHRITIS OF CARPOMETACARPAL (CMC) JOINT OF BOTH THUMBS: ICD-10-CM

## 2024-08-27 PROCEDURE — 97110 THERAPEUTIC EXERCISES: CPT | Mod: GO | Performed by: OCCUPATIONAL THERAPIST

## 2024-08-27 PROCEDURE — 97165 OT EVAL LOW COMPLEX 30 MIN: CPT | Mod: GO | Performed by: OCCUPATIONAL THERAPIST

## 2024-08-27 PROCEDURE — 97760 ORTHOTIC MGMT&TRAING 1ST ENC: CPT | Mod: GO | Performed by: OCCUPATIONAL THERAPIST

## 2024-08-27 NOTE — PROGRESS NOTES
OCCUPATIONAL THERAPY EVALUATION  Type of Visit: Evaluation        Fall Risk Screen:  Fall screen completed by: OT  Have you fallen 2 or more times in the past year?: No  Have you fallen and had an injury in the past year?: No  Is patient a fall risk?: No    Subjective      Presenting condition or subjective complaint: Both Thumbs  Date of onset: 08/13/24 (MD order date)    Relevant medical history:     Past Medical History:   Diagnosis Date    Attention deficit disorder, unspecified hyperactivity presence 3/1/2018    Benign essential hypertension 3/9/2022    Bilateral low back pain without sciatica, unspecified chronicity 3/1/2018    Chemical dependency (H)     Sober x 7 years.    Controlled substance agreement signed 3/1/2018    Dupuytren's contracture of both hands 3/1/2018    Elevated blood pressure reading without diagnosis of hypertension 11/13/2018    Elevated glucose 5/3/2018    Hyperlipidemia LDL goal <160 5/3/2018    The 10-year ASCVD risk score (Laverne GRIFFIN Jr, et al., 2013) is: 10.9%   Values used to calculate the score:     Age: 57 years     Sex: Male     Is Non- : No     Diabetic: No     Tobacco smoker: No     Systolic Blood Pressure: 135 mmHg     Is BP treated: No     HDL Cholesterol: 45 mg/dL     Total Cholesterol: 234 mg/dL     Learning disability 1/9/2020    Leukoplakia of tongue 3/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    Prior diagnostic imaging/testing results: X-ray     Prior therapy history for the same diagnosis, illness or injury: Yes custom thumb splints in the past    Prior Level of Function  Transfers: Independent  Ambulation: Independent  ADL: Independent    Living Environment  Social support: With a  significant other or spouse   Type of home: [facility]; 2-story   Stairs to enter the home: No       Stairs inside the home: Yes 12     Help at home: None    Employment: No    Hobbies/Interests: Reading, studying, computer use, drawing    Patient goals for therapy: use hands easier in the mornings     Objective   ADDITIONAL HISTORY:  Right hand dominant  Patient reports symptoms of pain, stiffness/loss of motion, and weakness/loss of strength  Transportation: drives  Currently not working    PAIN:  Pain Level at Rest: 7/10  Pain Level with Use: 10/10  Pain Location: thumb  Pain Quality: Dull and Sharp  Pain Frequency: constant  Pain is Worst: daytime  Pain is Exacerbated By: increased use  Pain is Relieved By: rest  Pain Progression: Worsened    ROM:   Thumb ROM  Left AROM Right AROM    MP Joint 0/73 -20/76   IP Joint  0/60 0/54   Radial Abduction 38 41   Palmar Abduction 41 42   Kapandji Opposition Scale (0-10/10) 10 9     OBSERVATIONS/APPEARANCE:   Thumb Left Right   Shoulder deformity present at CMC joint + +   Edema over CMC joint + +   Noted collapse of MP Joint into hyperextension during pinch + -      STRENGTH:     Measured in pounds 8/27/2024 8/27/2024    Left Right   Trial 1 56 72   Average 56 72     Lateral Pinch  Measured in pounds 8/27/2024 8/27/2024    Left Right   Trial 1 12 14   Average 12 14     3 Point Pinch  Measured in pounds 8/27/2024 8/27/2024    Left Right   Trial 1 11 14   Average 11 141       Assessment & Plan   CLINICAL IMPRESSIONS  Medical Diagnosis: B CMC OA    Treatment Diagnosis: B Thumb Pain    Impression/Assessment: Pt is a 63 year old male presenting to Occupational Therapy due to gradual onset of bilateral thumb pain.  The following significant findings have been identified: Impaired ROM, Impaired strength, and Pain.  These identified deficits interfere with their ability to perform self care tasks, recreational activities, and household chores as compared to previous level of  function.   Patient's limitations or Problem List includes: Pain, Decreased ROM/motion, Decreased , Decreased dexterity, and Tightness in musculature of the bilateral thumb which interferes with the patient's ability to perform Recreational Activities and Household Chores as compared to previous level of function.    Clinical Decision Making (Complexity):  Assessment of Occupational Performance: 3-5 Performance Deficits  Occupational Performance Limitations: home establishment and management, meal preparation and cleanup, and leisure activities  Clinical Decision Making (Complexity): Low complexity    PLAN OF CARE  Treatment Interventions:  Therapeutic Exercise:  AROM, PROM, Isotonics, Isometrics, and Stabilization  Manual Techniques:  Joint mobilization and Myofascial release  Orthotic Fabrication:  Static and Hand based  Self Care:  Self Care Tasks and Ergonomic Considerations    Long Term Goals   OT Goal 1  Goal Identifier: Household Chores  Goal Description: Pt will be able to open a new jar without pain in order to maximize independence with ADLs  Target Date: 10/08/24      Frequency of Treatment: 1x/week  Duration of Treatment: 6 weeks     Recommended Referrals to Other Professionals:  none  Education Assessment: Learner/Method: Patient;No Barriers to Learning     Risks and benefits of evaluation/treatment have been explained.   Patient/Family/caregiver agrees with Plan of Care.     Evaluation Time:    OT Eval, Low Complexity Minutes (50114): 18   Present: Not applicable     Signing Clinician: KALIA [facility]                                                                                   OUTPATIENT OCCUPATIONAL THERAPY      PLAN OF TREATMENT FOR OUTPATIENT REHABILITATION   Patient's Last Name, First Name, Parveen Desouza YOB: 1960   Provider's Name   [facility]   Medical Record  No.  [id number]     Onset Date: 08/13/24 (MD order date) Start of Care Date:       Medical Diagnosis:  B CMC OA      OT Treatment Diagnosis:  B Thumb Pain Plan of Treatment  Frequency/Duration:1x/week/6 weeks    Certification date from 08/27/24   To 10/08/24        See note for plan of treatment details and functional goals     Noreen Tillman, OT                         I CERTIFY THE NEED FOR THESE SERVICES FURNISHED UNDER        THIS PLAN OF TREATMENT AND WHILE UNDER MY CARE     (Physician attestation of this document indicates review and certification of the therapy plan).              Referring Provider:  Cornell Rowley    Initial Assessment  See Epic Evaluation-

## 2024-09-03 ENCOUNTER — THERAPY VISIT (OUTPATIENT)
Dept: OCCUPATIONAL THERAPY | Facility: CLINIC | Age: 64
End: 2024-09-03
Attending: STUDENT IN AN ORGANIZED HEALTH CARE EDUCATION/TRAINING PROGRAM
Payer: MEDICARE

## 2024-09-03 DIAGNOSIS — M79.646 THUMB PAIN, UNSPECIFIED LATERALITY: Primary | ICD-10-CM

## 2024-09-03 DIAGNOSIS — M18.0 OSTEOARTHRITIS OF CARPOMETACARPAL (CMC) JOINT OF BOTH THUMBS: ICD-10-CM

## 2024-09-03 PROCEDURE — 97140 MANUAL THERAPY 1/> REGIONS: CPT | Mod: GO | Performed by: OCCUPATIONAL THERAPIST

## 2024-09-03 PROCEDURE — 97110 THERAPEUTIC EXERCISES: CPT | Mod: GO | Performed by: OCCUPATIONAL THERAPIST

## 2024-09-11 ENCOUNTER — THERAPY VISIT (OUTPATIENT)
Dept: OCCUPATIONAL THERAPY | Facility: CLINIC | Age: 64
End: 2024-09-11
Attending: STUDENT IN AN ORGANIZED HEALTH CARE EDUCATION/TRAINING PROGRAM
Payer: MEDICARE

## 2024-09-11 DIAGNOSIS — M18.0 OSTEOARTHRITIS OF CARPOMETACARPAL (CMC) JOINT OF BOTH THUMBS: ICD-10-CM

## 2024-09-11 DIAGNOSIS — M79.646 THUMB PAIN, UNSPECIFIED LATERALITY: Primary | ICD-10-CM

## 2024-09-11 PROCEDURE — 97110 THERAPEUTIC EXERCISES: CPT | Mod: GO | Performed by: OCCUPATIONAL THERAPIST

## 2024-09-11 PROCEDURE — 97140 MANUAL THERAPY 1/> REGIONS: CPT | Mod: GO | Performed by: OCCUPATIONAL THERAPIST

## 2024-09-15 PROBLEM — M18.0 OSTEOARTHRITIS OF CARPOMETACARPAL (CMC) JOINT OF BOTH THUMBS: Status: RESOLVED | Noted: 2024-08-27 | Resolved: 2024-09-15

## 2024-09-15 PROBLEM — M79.646 THUMB PAIN, UNSPECIFIED LATERALITY: Status: RESOLVED | Noted: 2024-08-27 | Resolved: 2024-09-15

## 2024-09-16 NOTE — PROGRESS NOTES
09/11/24 0500   Appointment Info   Treating Provider Noreen Tillman, OTR, CHT   Total/Authorized Visits 6 (POC)   Visits Used 3   Medical Diagnosis B CMC OA   OT Tx Diagnosis B Thumb Pain   Progress Note/Certification   Onset of Illness/Injury or Date of Surgery 08/13/24  (MD order date)   Therapy Frequency 1x/week   Predicted Duration 6 weeks   Certification date from 08/27/24   Certification date to 10/08/24   Progress Note Due Date 10/08/24   Progress Note Completed Date 08/27/24   OT Goal 1   Goal Identifier Household Chores   Goal Description Pt will be able to open a new jar without pain in order to maximize independence with ADLs   Target Date 10/08/24   Date Met 09/11/24   Subjective Report   Subjective Report Not bad.   Treatment Interventions (OT)   Interventions Therapeutic Procedure/Exercise;Orthotics;Manual Therapy   Therapeutic Procedure/Exercise   Therapeutic Procedure: strength, endurance, ROM, flexibillity minutes (39722) 10   PTRx Ther Proc 1 Thumb Stabilization Web Space Release Method 1 with Clip   PTRx Ther Proc 1 - Details HEp   PTRx Ther Proc 2 Thumb Stabilization Strengthening Isometric C   PTRx Ther Proc 2 - Details HEP   PTRx Ther Proc 3 Thumb Stabilization 1st Dorsal Interosseous   PTRx Ther Proc 3 - Details HEP   PTRx Ther Proc 4 Thumb stabilization 1st Dorsal Interosseous with rubber band   PTRx Ther Proc 4 - Details 1 set 10 reps 3-4x/day hold for 5 seconds   Skilled Intervention Educated on and provided cueing for proper follow through with HEP.   Patient Response/Progress Good   Manual Therapy   Manual Therapy Minutes (86519) 19   Manual Therapy 1 MFR to thenars and thumb adductor   Manual Therapy 1 - Details moderate pressure   Skilled Intervention Reduced tightness in joint and musculature to decrease pain and improve function   Patient Response/Progress Pt tolerated well   Orthotics   Comments con't wear   Education   Learner/Method Patient;No Barriers to Learning          DISCHARGE  Reason for Discharge: Patient has met all goals.  No further expectation of progress.  Patient chooses to discontinue therapy.    Equipment Issued: none    Discharge Plan: Patient to continue home program.    Referring Provider:  Cornell Rowley

## 2024-09-26 NOTE — TELEPHONE ENCOUNTER
FUTURE VISIT INFORMATION      FUTURE VISIT INFORMATION:  Date: 12/2/24  Time: 9am  Location: [facility] [place]  REFERRAL INFORMATION:  Referring provider:  Leon Moss MD   Referring providers clinic:  [facility]  Reason for visit/diagnosis  Mucocele of mouth, apt per Pt, [place] verified     RECORDS REQUESTED FROM:       Clinic name Comments Records Status Imaging Status   [facility] 5/28/24- Ov Leon Esparza MD  Epic

## 2024-09-30 DIAGNOSIS — F90.0 ATTENTION DEFICIT HYPERACTIVITY DISORDER (ADHD), PREDOMINANTLY INATTENTIVE TYPE: ICD-10-CM

## 2024-09-30 RX ORDER — METHYLPHENIDATE HYDROCHLORIDE 20 MG/1
TABLET ORAL
Qty: 150 TABLET | Refills: 0 | Status: SHIPPED | OUTPATIENT
Start: 2024-10-01

## 2024-09-30 NOTE — TELEPHONE ENCOUNTER
Please call patient. I sent 1 more refill but we can't refill it again if he doesn't schedule a visit. Please help schedule to establish care. I am fine refilling it until an appointment if needed.    Margarito Vicente PA-C on 9/30/2024 at 10:11 AM (covering for Dr. Moss)

## 2024-09-30 NOTE — LETTER
October 1, 2024      Parveen Flor  [address] [facility] [address]      Dear Parveen,    We recently received a call from your pharmacy requesting a refill of your medication.    A review of your chart indicates that an appointment is required with your provider.  Please call the clinic to schedule your appointment.    We have authorized one refill of your medication to allow time for you to schedule.   If you have a history of diabetes or high cholesterol, please come in fasting for the appointment. Fasting entails nothing to eat or drink 8 hours prior to your appointment; with the exception on water. You may take your medication the day of the appointment.    Thank you,      Margartio Vicente PA-C

## 2024-11-06 ENCOUNTER — TELEPHONE (OUTPATIENT)
Dept: FAMILY MEDICINE | Facility: CLINIC | Age: 64
End: 2024-11-06

## 2024-11-06 ENCOUNTER — OFFICE VISIT (OUTPATIENT)
Dept: FAMILY MEDICINE | Facility: CLINIC | Age: 64
End: 2024-11-06
Payer: MEDICARE

## 2024-11-06 VITALS
DIASTOLIC BLOOD PRESSURE: 90 MMHG | WEIGHT: 154.5 LBS | BODY MASS INDEX: 21.63 KG/M2 | SYSTOLIC BLOOD PRESSURE: 154 MMHG | HEIGHT: 71 IN | TEMPERATURE: 98.1 F | OXYGEN SATURATION: 99 % | HEART RATE: 84 BPM | RESPIRATION RATE: 20 BRPM

## 2024-11-06 DIAGNOSIS — E78.5 HYPERLIPIDEMIA LDL GOAL <160: ICD-10-CM

## 2024-11-06 DIAGNOSIS — I10 BENIGN ESSENTIAL HYPERTENSION: ICD-10-CM

## 2024-11-06 DIAGNOSIS — Z51.81 ENCOUNTER FOR THERAPEUTIC DRUG LEVEL MONITORING: ICD-10-CM

## 2024-11-06 DIAGNOSIS — R73.09 ELEVATED GLUCOSE: ICD-10-CM

## 2024-11-06 DIAGNOSIS — F90.0 ATTENTION DEFICIT HYPERACTIVITY DISORDER (ADHD), PREDOMINANTLY INATTENTIVE TYPE: Primary | ICD-10-CM

## 2024-11-06 LAB
CREAT UR-MCNC: 99 MG/DL
EST. AVERAGE GLUCOSE BLD GHB EST-MCNC: 120 MG/DL
HBA1C MFR BLD: 5.8 % (ref 0–5.6)

## 2024-11-06 PROCEDURE — 36415 COLL VENOUS BLD VENIPUNCTURE: CPT | Performed by: PHYSICIAN ASSISTANT

## 2024-11-06 PROCEDURE — G2211 COMPLEX E/M VISIT ADD ON: HCPCS | Performed by: PHYSICIAN ASSISTANT

## 2024-11-06 PROCEDURE — G0481 DRUG TEST DEF 8-14 CLASSES: HCPCS | Performed by: PHYSICIAN ASSISTANT

## 2024-11-06 PROCEDURE — 80061 LIPID PANEL: CPT | Performed by: PHYSICIAN ASSISTANT

## 2024-11-06 PROCEDURE — 83036 HEMOGLOBIN GLYCOSYLATED A1C: CPT | Performed by: PHYSICIAN ASSISTANT

## 2024-11-06 PROCEDURE — 99214 OFFICE O/P EST MOD 30 MIN: CPT | Performed by: PHYSICIAN ASSISTANT

## 2024-11-06 RX ORDER — ATORVASTATIN CALCIUM 40 MG/1
40 TABLET, FILM COATED ORAL DAILY
Qty: 90 TABLET | Refills: 3 | Status: SHIPPED | OUTPATIENT
Start: 2024-11-06

## 2024-11-06 RX ORDER — METHYLPHENIDATE HYDROCHLORIDE 20 MG/1
TABLET ORAL
Qty: 150 TABLET | Refills: 0 | Status: SHIPPED | OUTPATIENT
Start: 2024-11-06

## 2024-11-06 NOTE — LETTER
November 12, 2024      [facility] [address]        Dear ,    We are writing to inform you of your test results.    Labs are as expected. A1c is now showing early prediabetes.     Resulted Orders   HEMOGLOBIN A1C   Result Value Ref Range    Estimated Average Glucose 120 (H) <117 mg/dL    Hemoglobin A1C 5.8 (H) 0.0 - 5.6 %      Comment:      Normal <5.7%   Prediabetes 5.7-6.4%    Diabetes 6.5% or higher     Note: Adopted from ADA consensus guidelines.   Lipid panel reflex to direct LDL Non-fasting   Result Value Ref Range    Cholesterol 201 (H) <200 mg/dL    Triglycerides 99 <150 mg/dL    Direct Measure HDL 53 >=40 mg/dL    LDL Cholesterol Calculated 128 (H) <100 mg/dL    Non HDL Cholesterol 148 (H) <130 mg/dL    Patient Fasting > 8hrs? No     Narrative    Cholesterol  Desirable: < 200 mg/dL  Borderline High: 200 - 239 mg/dL  High: >= 240 mg/dL    Triglycerides  Normal: < 150 mg/dL  Borderline High: 150 - 199 mg/dL  High: 200-499 mg/dL  Very High: >= 500 mg/dL    Direct Measure HDL  Female: >= 50 mg/dL   Male: >= 40 mg/dL    LDL Cholesterol  Desirable: < 100 mg/dL  Above Desirable: 100 - 129 mg/dL   Borderline High: 130 - 159 mg/dL   High:  160 - 189 mg/dL   Very High: >= 190 mg/dL    Non HDL Cholesterol  Desirable: < 130 mg/dL  Above Desirable: 130 - 159 mg/dL  Borderline High: 160 - 189 mg/dL  High: 190 - 219 mg/dL  Very High: >= 220 mg/dL   Urine Drug Confirmation Panel   Result Value Ref Range    Ritalinic Acid ng/mL 355 (H) <50 ng/mL    Ritalinic Acid 359 Absent ng/mg [creat]      Comment:      Ritalinic acid is an expected metabolite of methylphenidate.    Narrative    This test was developed and its performance characteristics determined by the [facility],  Special Chemistry Laboratory. It has not been cleared or approved by the FDA. The laboratory is regulated under CLIA as qualified to perform high-complexity testing. This test is  used for clinical purposes. It should not be regarded as investigational or for research.  Drugs with concentrations less than the cutoff will not be reported.  The drugs with applicable detection cutoff limits that are included within the Drug Confirmation Panel are:    The following drugs are detected with a cutoff of 3 ng/ml: FENTANYL    The following drugs are detected with a cutoff of 5 ng/mL: 6-ACETYLMORPHINE, BUPRENORPHINE, NALOXONE, NORBUPRENORPHINE, NORFENTANYL    The following drugs are detected with a cutoff of 10 ng/mL: SUFENTANIL    The following drugs are detected with a cutoff of 20 ng/mL: PCP (PHENCYCLIDINE)    The following drugs are detected with a cutoff of 50 ng/mL: 7-AMINOCLONAZEPAM, 7-AMINOFLUNITRAZEPAM, ALPRAZOLAM, AMPHETAMINE, A-OH-ALPRAZOLAM, A-OH-MIDAZOLAM, A-OH-TRIAZOLAM, BENZOYLECGONINE (Cocaine Metabolite), CLONAZEPAM, COCAETHYLENE (Cocaine Metabolite), CODEINE, DIAZEPAM, DIHYDROCODEINE, EDDP (Methadone Metabolite), HYDROCODONE, HYDROMORPHONE, LORAZEPAM, MDA (3,4-Methylenedioxyamphetamine), MDEA (3,3-Yykeoyceytnbjm-B-ethylcathinone), MDMA (Methylenedioxyamphetamine,Ecstasy), MEPERIDINE, METHADONE, METHAMPHETAMINE, METHYLPHENIDATE (Ritalin), MORPHINE, NALTREXONE, N-DESMETH-TAPENTADOL, NORCODEINE, NORDIAZEPAM, NORMEPERIDINE, O-NIK-TRAMADOL, OXAZEPAM, OXYCODONE, OXYMORPHONE, PROPOXYPHENE, RITALINIC ACID, TAPENTADOL, TEMAZEPAM, THEBAINE, TRAMADOL    The following drugs are detected with a cutoff of 100 ng/mL: GABAPENTIN, KETAMINE    The following drugs are detected with a cutoff of 200 ng/mL: PREGABALIN, XYLAZINE    Providers with questions surrounding testing results should contact the Clinical Chemistry service line.   Urine Creatinine for Drug Screen Panel   Result Value Ref Range    Creatinine Urine for Drug Screen 99 mg/dL      Comment:      The reference range has not been established for creatinine in random urines. The results should be integrated into the clinical context for  interpretation.       If you have any questions or concerns, please call the clinic at the number listed above.       Sincerely,      Margarito Vicente PA-C

## 2024-11-06 NOTE — PROGRESS NOTES
Assessment & Plan     Attention deficit hyperactivity disorder (ADHD), predominantly inattentive type    Well controlled although he has been out for a few days.     - [id number] - Urine Drug Confirmation Panel (Comprehensive); Future  - methylphenidate (RITALIN) 20 MG tablet; TAKE TWO TABLETS BY MOUTH EVERY MORNING, TAKE TWO TABLETS BY MOUTH DAILY AT MID DAY, AND TAKE ONE TABLET BY MOUTH DAILY AT BEDTIME. NEED APPT FOR FURTHER REFILLS  - [id number] - Urine Drug Confirmation Panel (Comprehensive)      Benign essential hypertension    BP is significantly elevated today. He states that he hasn't taken his blood pressure medication in a few days. Will have him check at home once restarting but may need to increase lisinopril if still elevated.      Encounter for therapeutic drug level monitoring    Due for drug screen today.    - [id number] - Urine Drug Confirmation Panel (Comprehensive); Future  - [id number] - Urine Drug Confirmation Panel (Comprehensive)      Hyperlipidemia LDL goal <160    Refilled atorvastatin. Recheck labs today. He is not fasting.    - Lipid panel reflex to direct LDL Non-fasting; Future  - atorvastatin (LIPITOR) 40 MG tablet; Take 1 tablet (40 mg) by mouth daily.  - Lipid panel reflex to direct LDL Non-fasting      Elevated glucose    Recheck A1c.    - HEMOGLOBIN A1C; Future  - HEMOGLOBIN A1C      The longitudinal plan of care for the diagnosis(es)/condition(s) as documented were addressed during this visit. Due to the added complexity in care, I will continue to support Parveen in the subsequent management and with ongoing continuity of care.        Subjective   Parveen is a 64 year old, presenting for the following health issues:  A.D.H.D    History of Present Illness       Reason for visit:  Med check   He is taking medications regularly.       ADHD Follow-Up (Adult)  Concerns: No, concerns.  Changes since last visit: Improving  Taking controlled (daily) medications as prescribed: Yes  Sleep: no  "problems  Adult ADHD Self-Reporting form given to patient?:  No  Currently in counseling: No    Medication Benefits:   Controlled symptoms: Hyperactivity - motor restlessness, Attention span, Distractability, and Finishing tasks  Uncontrolled symptoms:  None    Medication Side Effects:  Reports:  none  ++++++++++++++++++++++++++++++++++++++++++++++++    Employer Concerns/Feedback: Improving  Coworker Concerns:   Improving  Home/Family Concerns: Improving      Blood pressure is elevated significantly today. He ran out of Ritalin a few days ago and has not been taking his lisinopril since then either because he was out of his routine of taking the medications.       Review of Systems  Constitutional, HEENT, cardiovascular, pulmonary, gi and gu systems are negative, except as otherwise noted.        Objective    BP (!) 154/90 (BP Location: Right arm, Patient Position: Sitting, Cuff Size: Adult Regular)   Pulse 84   Temp 98.1  F (36.7  C) (Oral)   Resp 20   Ht 1.803 m (5' 11\")   Wt 70.1 kg (154 lb 8 oz)   SpO2 99%   BMI 21.55 kg/m    Body mass index is 21.55 kg/m .        Physical Exam   GENERAL: alert and no distress  EYES: Eyes grossly normal to inspection, PERRL and conjunctivae and sclerae normal  RESP: lungs clear to auscultation - no rales, rhonchi or wheezes  CV: regular rate and rhythm, normal S1 S2, no S3 or S4, no murmur, click or rub, no peripheral edema  MS: no gross musculoskeletal defects noted, no edema  SKIN: no suspicious lesions or rashes  NEURO: Normal strength and tone, mentation intact and speech normal  PSYCH: mentation appears normal, affect normal/bright            Signed Electronically by: Margarito Vicente PA-C    "

## 2024-11-06 NOTE — TELEPHONE ENCOUNTER
Called and spoke with pt,     Informed him that he will be receiving a call in 1 week to check in with him on his BP readings.     Pt verbalizes understanding and is agreeable with plan. He is agreeable with checking his BP daily 1 hour after taking lisinopril. Pt denies any further questions or concerns at this time.     Rosanna YADAV RN   Clinic RN  [facility]

## 2024-11-06 NOTE — TELEPHONE ENCOUNTER
Please call patient to get current blood pressure readings in 1 week.    Margarito Vicente PA-C on 11/6/2024 at 11:16 AM

## 2024-11-06 NOTE — LETTER
November 18, 2024      Parveen [address]  [place] [facility] [address]        Dear Parveen,     We have attempted to reach you by phone. Please call the clinic at [phone] and ask to speak with a nurse to give us an update on your blood pressure readings that you have obtained at home. Thank you!              Sincerely,        Margarito Vicente PA-C/Rosanna YADAV RN

## 2024-11-07 LAB
CHOLEST SERPL-MCNC: 201 MG/DL
FASTING STATUS PATIENT QL REPORTED: NO
HDLC SERPL-MCNC: 53 MG/DL
LDLC SERPL CALC-MCNC: 128 MG/DL
NONHDLC SERPL-MCNC: 148 MG/DL
TRIGL SERPL-MCNC: 99 MG/DL

## 2024-11-11 LAB
ME-PHENIDATE UR CFM-MCNC: 355 NG/ML
ME-PHENIDATE/CREAT UR: 359 NG/MG {CREAT}

## 2024-11-13 NOTE — TELEPHONE ENCOUNTER
Attempt #1 left to return call     Voicemail states Eduardo Del Toro (who is not listed in demographics)   RN left general message asking for Parveen to return call    Ginny Fischer Registered Nurse  [facility]

## 2024-11-14 NOTE — TELEPHONE ENCOUNTER
Outgoing call to patient. Attempt # 2. Left message to call back any nurse.    Susana Nuñez RN on 11/14/2024 at 8:07 AM

## 2024-11-15 NOTE — TELEPHONE ENCOUNTER
Attempt x 3. Called pt and left a message to call back to [phone] and to ask to speak to a nurse.     -If no response by 11/18, send letter.     When pt calls back,     Request updated BP readings.     Rosanna YADAV RN   Clinic RN  [facility]

## 2024-12-02 ENCOUNTER — PRE VISIT (OUTPATIENT)
Dept: OTOLARYNGOLOGY | Facility: CLINIC | Age: 64
End: 2024-12-02

## 2025-01-02 DIAGNOSIS — F90.0 ATTENTION DEFICIT HYPERACTIVITY DISORDER (ADHD), PREDOMINANTLY INATTENTIVE TYPE: ICD-10-CM

## 2025-01-02 RX ORDER — METHYLPHENIDATE HYDROCHLORIDE 20 MG/1
TABLET ORAL
Qty: 150 TABLET | Refills: 0 | Status: SHIPPED | OUTPATIENT
Start: 2025-01-03

## 2025-02-03 DIAGNOSIS — F90.0 ATTENTION DEFICIT HYPERACTIVITY DISORDER (ADHD), PREDOMINANTLY INATTENTIVE TYPE: ICD-10-CM

## 2025-02-03 RX ORDER — METHYLPHENIDATE HYDROCHLORIDE 20 MG/1
TABLET ORAL
Qty: 150 TABLET | Refills: 0 | Status: SHIPPED | OUTPATIENT
Start: 2025-02-03

## 2025-02-03 NOTE — TELEPHONE ENCOUNTER
reviewed. Refilled. Up to date on visit and has visit scheduled to establish care this month.    Margarito Vicente PA-C on 2/3/2025 at 10:27 AM

## 2025-02-19 ENCOUNTER — OFFICE VISIT (OUTPATIENT)
Dept: FAMILY MEDICINE | Facility: CLINIC | Age: 65
End: 2025-02-19
Payer: MEDICARE

## 2025-02-19 VITALS
SYSTOLIC BLOOD PRESSURE: 133 MMHG | HEART RATE: 77 BPM | RESPIRATION RATE: 18 BRPM | TEMPERATURE: 97.9 F | BODY MASS INDEX: 21.81 KG/M2 | OXYGEN SATURATION: 98 % | DIASTOLIC BLOOD PRESSURE: 88 MMHG | HEIGHT: 71 IN | WEIGHT: 155.8 LBS

## 2025-02-19 DIAGNOSIS — E78.5 HYPERLIPIDEMIA LDL GOAL <160: ICD-10-CM

## 2025-02-19 DIAGNOSIS — I10 BENIGN ESSENTIAL HYPERTENSION: ICD-10-CM

## 2025-02-19 DIAGNOSIS — M72.0 DUPUYTREN'S CONTRACTURE OF BOTH HANDS: ICD-10-CM

## 2025-02-19 DIAGNOSIS — F90.0 ATTENTION DEFICIT HYPERACTIVITY DISORDER (ADHD), PREDOMINANTLY INATTENTIVE TYPE: Primary | ICD-10-CM

## 2025-02-19 DIAGNOSIS — R73.03 PREDIABETES: ICD-10-CM

## 2025-02-19 PROCEDURE — 99214 OFFICE O/P EST MOD 30 MIN: CPT | Performed by: PHYSICIAN ASSISTANT

## 2025-02-19 RX ORDER — METHYLPHENIDATE HYDROCHLORIDE 20 MG/1
TABLET ORAL
Qty: 150 TABLET | Refills: 0 | Status: SHIPPED | OUTPATIENT
Start: 2025-03-03

## 2025-02-19 RX ORDER — ATORVASTATIN CALCIUM 40 MG/1
40 TABLET, FILM COATED ORAL DAILY
Qty: 90 TABLET | Refills: 3 | Status: CANCELLED | OUTPATIENT
Start: 2025-02-19

## 2025-02-19 RX ORDER — METHYLPHENIDATE HYDROCHLORIDE 20 MG/1
TABLET ORAL
Qty: 150 TABLET | Refills: 0 | Status: SHIPPED | OUTPATIENT
Start: 2025-05-02

## 2025-02-19 RX ORDER — METHYLPHENIDATE HYDROCHLORIDE 20 MG/1
TABLET ORAL
Qty: 150 TABLET | Refills: 0 | Status: SHIPPED | OUTPATIENT
Start: 2025-04-02

## 2025-02-19 RX ORDER — LISINOPRIL 20 MG/1
20 TABLET ORAL DAILY
Qty: 90 TABLET | Refills: 1 | Status: SHIPPED | OUTPATIENT
Start: 2025-02-19

## 2025-02-19 NOTE — PROGRESS NOTES
Assessment & Plan     Attention deficit hyperactivity disorder (ADHD), predominantly inattentive type  Urine drug screening obtained in November.  reviewed and refills provided. They should get him to visit in May for annual wellness.  - methylphenidate (RITALIN) 20 MG tablet; TAKE TWO TABLETS BY MOUTH EVERY MORNING, TWO TABLETS DAILY AT MID DAY, AND TAKE ONE TABLET BY MOUTH DAILY AT BEDTIME.  - methylphenidate (RITALIN) 20 MG tablet; TAKE TWO TABLETS BY MOUTH EVERY MORNING, TWO TABLETS DAILY AT MID DAY, AND TAKE ONE TABLET BY MOUTH DAILY AT BEDTIME.  - methylphenidate (RITALIN) 20 MG tablet; TAKE TWO TABLETS BY MOUTH EVERY MORNING, TWO TABLETS DAILY AT MID DAY, AND TAKE ONE TABLET BY MOUTH DAILY AT BEDTIME.    Hyperlipidemia LDL goal <160  Not quite due for refill. Continue at current dosing.    Benign essential hypertension  Well controlled. Continued at current dosing.  - lisinopril (ZESTRIL) 20 MG tablet; Take 1 tablet (20 mg) by mouth daily.    Prediabetes  A1c slightly elevated in November when checked. Handout regarding lifestyle adjustments provided.    Dupuytren's contracture of both hands  Has seen orthopedic hand previously. He still has function of the hands and had decided no surgery at this time. Can always send referral to hand again if needed and function is changing.      Review of external notes as documented elsewhere in note  Review of the result(s) of each unique test - As noted above  Prescription drug management      Joby Saini is a 64 year old, presenting for the following health issues:  Recheck Medication        2/19/2025    10:21 AM   Additional Questions   Roomed by Noreen   Accompanied by Self     History of Present Illness       Reason for visit:  Check up   He is taking medications regularly.       He reports that the methylphenidate helps manage his distractions and focus.    No troubles sleeping, GI upset or decreased appetite.        Objective    /88 (BP Location:  "Left arm, Patient Position: Sitting, Cuff Size: Adult Large)   Pulse 77   Temp 97.9  F (36.6  C) (Oral)   Resp 18   Ht 1.803 m (5' 11\")   Wt 70.7 kg (155 lb 12.8 oz)   SpO2 98%   BMI 21.73 kg/m    Body mass index is 21.73 kg/m .  Physical Exam   GENERAL: No acute distress  HEENT: Normocephalic  NEURO: Alert and non-focal  PSYCH: Fast speech, animated        Signed Electronically by: Jazmyn Hairston PANadyaC    "

## 2025-05-29 DIAGNOSIS — F90.0 ATTENTION DEFICIT HYPERACTIVITY DISORDER (ADHD), PREDOMINANTLY INATTENTIVE TYPE: ICD-10-CM

## 2025-05-29 RX ORDER — METHYLPHENIDATE HYDROCHLORIDE 20 MG/1
TABLET ORAL
Qty: 150 TABLET | Refills: 0 | Status: SHIPPED | OUTPATIENT
Start: 2025-06-01

## 2025-06-30 DIAGNOSIS — F90.0 ATTENTION DEFICIT HYPERACTIVITY DISORDER (ADHD), PREDOMINANTLY INATTENTIVE TYPE: ICD-10-CM

## 2025-06-30 RX ORDER — METHYLPHENIDATE HYDROCHLORIDE 20 MG/1
TABLET ORAL
Qty: 150 TABLET | Refills: 0 | Status: SHIPPED | OUTPATIENT
Start: 2025-07-30

## 2025-06-30 RX ORDER — METHYLPHENIDATE HYDROCHLORIDE 20 MG/1
TABLET ORAL
Qty: 150 TABLET | Refills: 0 | Status: SHIPPED | OUTPATIENT
Start: 2025-06-30

## 2025-07-22 ENCOUNTER — RESULTS FOLLOW-UP (OUTPATIENT)
Dept: FAMILY MEDICINE | Facility: CLINIC | Age: 65
End: 2025-07-22

## 2025-07-22 ENCOUNTER — OFFICE VISIT (OUTPATIENT)
Dept: FAMILY MEDICINE | Facility: CLINIC | Age: 65
End: 2025-07-22
Payer: MEDICARE

## 2025-07-22 VITALS
OXYGEN SATURATION: 98 % | WEIGHT: 159 LBS | RESPIRATION RATE: 22 BRPM | SYSTOLIC BLOOD PRESSURE: 138 MMHG | HEART RATE: 70 BPM | HEIGHT: 71 IN | TEMPERATURE: 98.4 F | DIASTOLIC BLOOD PRESSURE: 87 MMHG | BODY MASS INDEX: 22.26 KG/M2

## 2025-07-22 DIAGNOSIS — I10 BENIGN ESSENTIAL HYPERTENSION: Primary | ICD-10-CM

## 2025-07-22 DIAGNOSIS — R10.9 FLANK PAIN: ICD-10-CM

## 2025-07-22 LAB
ALBUMIN UR-MCNC: NEGATIVE MG/DL
APPEARANCE UR: CLEAR
BASOPHILS # BLD AUTO: 0.1 10E3/UL (ref 0–0.2)
BASOPHILS NFR BLD AUTO: 1 %
BILIRUB UR QL STRIP: NEGATIVE
COLOR UR AUTO: YELLOW
EOSINOPHIL # BLD AUTO: 0.2 10E3/UL (ref 0–0.7)
EOSINOPHIL NFR BLD AUTO: 2 %
ERYTHROCYTE [DISTWIDTH] IN BLOOD BY AUTOMATED COUNT: 14.2 % (ref 10–15)
GLUCOSE UR STRIP-MCNC: NEGATIVE MG/DL
HCT VFR BLD AUTO: 48.9 % (ref 40–53)
HGB BLD-MCNC: 15.9 G/DL (ref 13.3–17.7)
HGB UR QL STRIP: NEGATIVE
IMM GRANULOCYTES # BLD: 0 10E3/UL
IMM GRANULOCYTES NFR BLD: 0 %
KETONES UR STRIP-MCNC: NEGATIVE MG/DL
LEUKOCYTE ESTERASE UR QL STRIP: NEGATIVE
LYMPHOCYTES # BLD AUTO: 1.5 10E3/UL (ref 0.8–5.3)
LYMPHOCYTES NFR BLD AUTO: 15 %
MCH RBC QN AUTO: 28 PG (ref 26.5–33)
MCHC RBC AUTO-ENTMCNC: 32.5 G/DL (ref 31.5–36.5)
MCV RBC AUTO: 86 FL (ref 78–100)
MONOCYTES # BLD AUTO: 0.6 10E3/UL (ref 0–1.3)
MONOCYTES NFR BLD AUTO: 6 %
NEUTROPHILS # BLD AUTO: 7.8 10E3/UL (ref 1.6–8.3)
NEUTROPHILS NFR BLD AUTO: 77 %
NITRATE UR QL: NEGATIVE
PH UR STRIP: 5.5 [PH] (ref 5–7)
PLATELET # BLD AUTO: 240 10E3/UL (ref 150–450)
RBC # BLD AUTO: 5.67 10E6/UL (ref 4.4–5.9)
SP GR UR STRIP: >=1.03 (ref 1–1.03)
UROBILINOGEN UR STRIP-ACNC: 0.2 E.U./DL
WBC # BLD AUTO: 10.1 10E3/UL (ref 4–11)

## 2025-07-22 PROCEDURE — 85025 COMPLETE CBC W/AUTO DIFF WBC: CPT | Performed by: PHYSICIAN ASSISTANT

## 2025-07-22 PROCEDURE — 80048 BASIC METABOLIC PNL TOTAL CA: CPT | Performed by: PHYSICIAN ASSISTANT

## 2025-07-22 PROCEDURE — 99213 OFFICE O/P EST LOW 20 MIN: CPT | Performed by: PHYSICIAN ASSISTANT

## 2025-07-22 PROCEDURE — 3075F SYST BP GE 130 - 139MM HG: CPT | Performed by: PHYSICIAN ASSISTANT

## 2025-07-22 PROCEDURE — 36415 COLL VENOUS BLD VENIPUNCTURE: CPT | Performed by: PHYSICIAN ASSISTANT

## 2025-07-22 PROCEDURE — 81003 URINALYSIS AUTO W/O SCOPE: CPT | Performed by: PHYSICIAN ASSISTANT

## 2025-07-22 PROCEDURE — 3079F DIAST BP 80-89 MM HG: CPT | Performed by: PHYSICIAN ASSISTANT

## 2025-07-22 RX ORDER — LIDOCAINE 4 G/G
1 PATCH TOPICAL EVERY 24 HOURS
Qty: 30 PATCH | Refills: 1 | Status: SHIPPED | OUTPATIENT
Start: 2025-07-22

## 2025-07-22 NOTE — PROGRESS NOTES
Assessment & Plan     Flank pain  Symptoms consistent with kidney stone vs musculoskeletal back pain. Urine negative for infection or blood suggesting against kidney stone.  CBC shows normal white count and again urine shows no infection.  BMP pending.   For now reasonable to treat as musculoskeletal back pain. Offered muscle relaxer but he declines. He would prefer to use conservative treatment such as ice/heat and lidocaine patches.  If continues to not improves we could still consider CT to look for possible stone.  - UA Macroscopic with reflex to Microscopic and Culture - Lab Collect; Future  - CBC with platelets and differential; Future  - UA Macroscopic with reflex to Microscopic and Culture - Lab Collect  - CBC with platelets and differential  - Lidocaine (LIDOCARE) 4 % Patch; Place 1 patch over 12 hours onto the skin every 24 hours. To prevent lidocaine toxicity, patient should be patch free for 12 hrs daily.    Benign essential hypertension  Well controlled. Due for BMP. Obtained today.  - BASIC METABOLIC PANEL; Future  - BASIC METABOLIC PANEL      Joby Saini is a 64 year old, presenting for the following health issues:  Musculoskeletal Problem      7/22/2025     1:05 PM   Additional Questions   Roomed by Meche     Musculoskeletal Problem    History of Present Illness       Reason for visit:  Pain in my left side   He is taking medications regularly.      Abdominal/Flank Pain  Onset/Duration: 3 days  Description:   Character: Sharp and Stabbing  Location: left flank  Radiation: None  Intensity: 9/10  Progression of Symptoms:  same  Accompanying Signs & Symptoms:  Fever/Chills: No  Gas/Bloating: No  Nausea: YES  Vomitting: No  Diarrhea: No  Constipation: No  Dysuria or Hematuria: No  History:   Trauma: No  Previous similar pain: No  Previous tests done: none  Precipitating factors:   Does the pain change with:     Food: No    Bowel Movement: No    Urination: No   Other factors:  No  Therapies  "tried and outcome: ibuprofen            Objective    /87 (BP Location: Left arm, Patient Position: Chair, Cuff Size: Adult Regular)   Pulse 70   Temp 98.4  F (36.9  C) (Oral)   Resp 22   Ht 1.803 m (5' 11\")   Wt 72.1 kg (159 lb)   SpO2 98%   BMI 22.18 kg/m    Body mass index is 22.18 kg/m .  Physical Exam   GENERAL: No acute distress  HEENT: Normocephalic,  CARDIAC: Regular rate and rhythm. No murmurs.  PULMONARY: Lungs are clear to auscultation bilaterally. No wheezes, rhonchi or crackles.  GI: Active bowel sounds, abdomen is soft and non-tender  : No CVA tenderness bilaterally.   EXTREMITIES: No midline tenderness over the thoracic or lumbar spine. No tenderness over the right lumbar paraspinous muscles. Tenderness over the left lumbar paraspinous muscles.  Right lower extremity: 5/5 strength with flexion of the knee, 5/5 strength with extension of the knee, 5/5 strength with flexion of the hip, 5/5 strength with dorsiflexion, 5/5 strength with plantar flexion.   Left lower extremity: 5/5 strength with flexion of the knee, 5/5 strength with extension of the knee, 5/5 strength with flexion of the hip, 5/5 strength with dorsiflexion, 5/5 strength with plantar flexion.  NEURO: Alert and non-focal    Recent Results (from the past 24 hours)   CBC with platelets and differential    Narrative    The following orders were created for panel order CBC with platelets and differential.  Procedure                               Abnormality         Status                     ---------                               -----------         ------                     CBC with platelets and ...[[id number]]                      Final result                 Please view results for these tests on the individual orders.   CBC with platelets and differential   Result Value Ref Range    WBC Count 10.1 4.0 - 11.0 10e3/uL    RBC Count 5.67 4.40 - 5.90 10e6/uL    Hemoglobin 15.9 13.3 - 17.7 g/dL    Hematocrit 48.9 40.0 - 53.0 %    " MCV 86 78 - 100 fL    MCH 28.0 26.5 - 33.0 pg    MCHC 32.5 31.5 - 36.5 g/dL    RDW 14.2 10.0 - 15.0 %    Platelet Count 240 150 - 450 10e3/uL    % Neutrophils 77 %    % Lymphocytes 15 %    % Monocytes 6 %    % Eosinophils 2 %    % Basophils 1 %    % Immature Granulocytes 0 %    Absolute Neutrophils 7.8 1.6 - 8.3 10e3/uL    Absolute Lymphocytes 1.5 0.8 - 5.3 10e3/uL    Absolute Monocytes 0.6 0.0 - 1.3 10e3/uL    Absolute Eosinophils 0.2 0.0 - 0.7 10e3/uL    Absolute Basophils 0.1 0.0 - 0.2 10e3/uL    Absolute Immature Granulocytes 0.0 <=0.4 10e3/uL   UA Macroscopic with reflex to Microscopic and Culture - Lab Collect    Specimen: Urine, Clean Catch   Result Value Ref Range    Color Urine Yellow Colorless, Straw, Light Yellow, Yellow    Appearance Urine Clear Clear    Glucose Urine Negative Negative mg/dL    Bilirubin Urine Negative Negative    Ketones Urine Negative Negative mg/dL    Specific Gravity Urine >=1.030 1.003 - 1.035    Blood Urine Negative Negative    pH Urine 5.5 5.0 - 7.0    Protein Albumin Urine Negative Negative mg/dL    Urobilinogen Urine 0.2 0.2, 1.0 E.U./dL    Nitrite Urine Negative Negative    Leukocyte Esterase Urine Negative Negative    Narrative    Microscopic not indicated           Signed Electronically by: Jazmyn Hairston PA-C

## 2025-07-22 NOTE — LETTER
July 23, 2025      Parveen [address]  [place] [address]        Dear ,    We are writing to inform you of your test results.  I tried to call x 2 and the number listed is for someone else.  Please call [phone] to update your phone number.  See Jazmyn's message below.    Jazmyn Hairston PA-C to [facility]     7/23/25  7:13 AM  Result Note  Please call patient and let him know that electrolytes and kidney function are normal. If pain continues to be as severe as it is we can still get CT imaging to evaluate further. However at this time testing is pointing to the pain most likely being more musculoskeletal.    Resulted Orders   BASIC METABOLIC PANEL   Result Value Ref Range    Sodium 142 135 - 145 mmol/L    Potassium 5.3 3.4 - 5.3 mmol/L    Chloride 105 98 - 107 mmol/L    Carbon Dioxide (CO2) 27 22 - 29 mmol/L    Anion Gap 10 7 - 15 mmol/L    Urea Nitrogen 18.8 8.0 - 23.0 mg/dL    Creatinine 0.90 0.67 - 1.17 mg/dL    GFR Estimate >90 >60 mL/min/1.73m2      Comment:      eGFR calculated using 2021 CKD-EPI equation.    Calcium 9.8 8.8 - 10.4 mg/dL    Glucose 101 (H) 70 - 99 mg/dL   UA Macroscopic with reflex to Microscopic and Culture - Lab Collect   Result Value Ref Range    Color Urine Yellow Colorless, Straw, Light Yellow, Yellow    Appearance Urine Clear Clear    Glucose Urine Negative Negative mg/dL    Bilirubin Urine Negative Negative    Ketones Urine Negative Negative mg/dL    Specific Gravity Urine >=1.030 1.003 - 1.035    Blood Urine Negative Negative    pH Urine 5.5 5.0 - 7.0    Protein Albumin Urine Negative Negative mg/dL    Urobilinogen Urine 0.2 0.2, 1.0 E.U./dL    Nitrite Urine Negative Negative    Leukocyte Esterase Urine Negative Negative    Narrative    Microscopic not indicated   CBC with platelets and differential   Result Value Ref Range    WBC Count 10.1 4.0 - 11.0 10e3/uL    RBC Count 5.67 4.40 - 5.90 10e6/uL    Hemoglobin  15.9 13.3 - 17.7 g/dL    Hematocrit 48.9 40.0 - 53.0 %    MCV 86 78 - 100 fL    MCH 28.0 26.5 - 33.0 pg    MCHC 32.5 31.5 - 36.5 g/dL    RDW 14.2 10.0 - 15.0 %    Platelet Count 240 150 - 450 10e3/uL    % Neutrophils 77 %    % Lymphocytes 15 %    % Monocytes 6 %    % Eosinophils 2 %    % Basophils 1 %    % Immature Granulocytes 0 %    Absolute Neutrophils 7.8 1.6 - 8.3 10e3/uL    Absolute Lymphocytes 1.5 0.8 - 5.3 10e3/uL    Absolute Monocytes 0.6 0.0 - 1.3 10e3/uL    Absolute Eosinophils 0.2 0.0 - 0.7 10e3/uL    Absolute Basophils 0.1 0.0 - 0.2 10e3/uL    Absolute Immature Granulocytes 0.0 <=0.4 10e3/uL       If you have any questions or concerns, please call the clinic at the number listed above.       Sincerely,        Hanh Thompson RN  Electronically signed

## 2025-07-23 LAB
ANION GAP SERPL CALCULATED.3IONS-SCNC: 10 MMOL/L (ref 7–15)
BUN SERPL-MCNC: 18.8 MG/DL (ref 8–23)
CALCIUM SERPL-MCNC: 9.8 MG/DL (ref 8.8–10.4)
CHLORIDE SERPL-SCNC: 105 MMOL/L (ref 98–107)
CREAT SERPL-MCNC: 0.9 MG/DL (ref 0.67–1.17)
EGFRCR SERPLBLD CKD-EPI 2021: >90 ML/MIN/1.73M2
GLUCOSE SERPL-MCNC: 101 MG/DL (ref 70–99)
HCO3 SERPL-SCNC: 27 MMOL/L (ref 22–29)
POTASSIUM SERPL-SCNC: 5.3 MMOL/L (ref 3.4–5.3)
SODIUM SERPL-SCNC: 142 MMOL/L (ref 135–145)

## 2025-07-23 NOTE — TELEPHONE ENCOUNTER
Called x 2.  # is not for patient.  No other phone # listed.  Does not have mychart.  Letter sent with result note and to update phone #.  Hanh Thompson RN

## 2025-08-06 ENCOUNTER — ORDERS ONLY (AUTO-RELEASED) (OUTPATIENT)
Dept: FAMILY MEDICINE | Facility: CLINIC | Age: 65
End: 2025-08-06

## 2025-08-06 ENCOUNTER — OFFICE VISIT (OUTPATIENT)
Dept: FAMILY MEDICINE | Facility: CLINIC | Age: 65
End: 2025-08-06
Payer: MEDICARE

## 2025-08-06 VITALS
OXYGEN SATURATION: 99 % | TEMPERATURE: 97.4 F | RESPIRATION RATE: 20 BRPM | HEART RATE: 75 BPM | WEIGHT: 154.3 LBS | DIASTOLIC BLOOD PRESSURE: 84 MMHG | HEIGHT: 69 IN | SYSTOLIC BLOOD PRESSURE: 124 MMHG | BODY MASS INDEX: 22.85 KG/M2

## 2025-08-06 DIAGNOSIS — Z00.00 ENCOUNTER FOR MEDICARE ANNUAL WELLNESS EXAM: Primary | ICD-10-CM

## 2025-08-06 DIAGNOSIS — Z12.11 SCREEN FOR COLON CANCER: ICD-10-CM

## 2025-08-06 DIAGNOSIS — E78.5 HYPERLIPIDEMIA LDL GOAL <100: ICD-10-CM

## 2025-08-06 DIAGNOSIS — I10 BENIGN ESSENTIAL HYPERTENSION: ICD-10-CM

## 2025-08-06 DIAGNOSIS — R73.03 PREDIABETES: ICD-10-CM

## 2025-08-06 DIAGNOSIS — F90.0 ATTENTION DEFICIT HYPERACTIVITY DISORDER (ADHD), PREDOMINANTLY INATTENTIVE TYPE: ICD-10-CM

## 2025-08-06 DIAGNOSIS — Z13.29 SCREENING FOR THYROID DISORDER: ICD-10-CM

## 2025-08-06 PROBLEM — Z51.81 ENCOUNTER FOR THERAPEUTIC DRUG LEVEL MONITORING: Status: RESOLVED | Noted: 2021-07-08 | Resolved: 2025-08-06

## 2025-08-06 PROBLEM — Z13.6 ENCOUNTER FOR ABDOMINAL AORTIC ANEURYSM (AAA) SCREENING: Status: RESOLVED | Noted: 2023-11-01 | Resolved: 2025-08-06

## 2025-08-06 PROBLEM — Z23 ENCOUNTER FOR IMMUNIZATION: Status: RESOLVED | Noted: 2022-10-05 | Resolved: 2025-08-06

## 2025-08-06 PROBLEM — M15.0 PRIMARY OSTEOARTHRITIS INVOLVING MULTIPLE JOINTS: Status: ACTIVE | Noted: 2021-02-23

## 2025-08-06 PROBLEM — R73.09 ELEVATED GLUCOSE: Status: RESOLVED | Noted: 2018-05-03 | Resolved: 2025-08-06

## 2025-08-06 PROBLEM — M25.50 POLYARTHRALGIA: Status: ACTIVE | Noted: 2021-02-23

## 2025-08-06 PROCEDURE — 3079F DIAST BP 80-89 MM HG: CPT | Performed by: FAMILY MEDICINE

## 2025-08-06 PROCEDURE — 99214 OFFICE O/P EST MOD 30 MIN: CPT | Mod: 25 | Performed by: FAMILY MEDICINE

## 2025-08-06 PROCEDURE — G0439 PPPS, SUBSEQ VISIT: HCPCS | Performed by: FAMILY MEDICINE

## 2025-08-06 PROCEDURE — G2211 COMPLEX E/M VISIT ADD ON: HCPCS | Performed by: FAMILY MEDICINE

## 2025-08-06 PROCEDURE — 3074F SYST BP LT 130 MM HG: CPT | Performed by: FAMILY MEDICINE

## 2025-08-06 RX ORDER — ATORVASTATIN CALCIUM 40 MG/1
40 TABLET, FILM COATED ORAL DAILY
Qty: 90 TABLET | Refills: 3 | Status: SHIPPED | OUTPATIENT
Start: 2025-08-06

## 2025-08-06 RX ORDER — LISINOPRIL 20 MG/1
20 TABLET ORAL DAILY
Qty: 90 TABLET | Refills: 1 | Status: SHIPPED | OUTPATIENT
Start: 2025-08-06

## 2025-08-06 SDOH — HEALTH STABILITY: PHYSICAL HEALTH: ON AVERAGE, HOW MANY DAYS PER WEEK DO YOU ENGAGE IN MODERATE TO STRENUOUS EXERCISE (LIKE A BRISK WALK)?: 4 DAYS

## 2025-08-06 ASSESSMENT — SOCIAL DETERMINANTS OF HEALTH (SDOH): HOW OFTEN DO YOU GET TOGETHER WITH FRIENDS OR RELATIVES?: PATIENT DECLINED
